# Patient Record
Sex: MALE | Race: BLACK OR AFRICAN AMERICAN | Employment: OTHER | URBAN - METROPOLITAN AREA
[De-identification: names, ages, dates, MRNs, and addresses within clinical notes are randomized per-mention and may not be internally consistent; named-entity substitution may affect disease eponyms.]

---

## 2017-03-31 ENCOUNTER — HOSPITAL ENCOUNTER (INPATIENT)
Age: 67
LOS: 1 days | Discharge: HOME | DRG: 811 | End: 2017-04-01
Attending: EMERGENCY MEDICINE | Admitting: INTERNAL MEDICINE
Payer: MEDICARE

## 2017-03-31 DIAGNOSIS — D64.9 ANEMIA, UNSPECIFIED TYPE: Primary | ICD-10-CM

## 2017-03-31 PROBLEM — D64.89 OTHER SPECIFIED ANEMIAS: Status: ACTIVE | Noted: 2017-03-31

## 2017-03-31 LAB
ANION GAP BLD CALC-SCNC: 4 MMOL/L (ref 5–15)
ATRIAL RATE: 98 BPM
BASOPHILS # BLD AUTO: 0 K/UL (ref 0–0.1)
BASOPHILS # BLD: 0 % (ref 0–1)
BUN SERPL-MCNC: 12 MG/DL (ref 6–20)
BUN/CREAT SERPL: 3 (ref 12–20)
CALCIUM SERPL-MCNC: 8.6 MG/DL (ref 8.5–10.1)
CALCULATED P AXIS, ECG09: 55 DEGREES
CALCULATED R AXIS, ECG10: 8 DEGREES
CALCULATED T AXIS, ECG11: -177 DEGREES
CHLORIDE SERPL-SCNC: 95 MMOL/L (ref 97–108)
CO2 SERPL-SCNC: 38 MMOL/L (ref 21–32)
CREAT SERPL-MCNC: 3.9 MG/DL (ref 0.7–1.3)
DIAGNOSIS, 93000: NORMAL
DIFFERENTIAL METHOD BLD: ABNORMAL
EOSINOPHIL # BLD: 0.8 K/UL (ref 0–0.4)
EOSINOPHIL NFR BLD: 11 % (ref 0–7)
ERYTHROCYTE [DISTWIDTH] IN BLOOD BY AUTOMATED COUNT: 18.9 % (ref 11.5–14.5)
FERRITIN SERPL-MCNC: 581 NG/ML (ref 26–388)
FOLATE SERPL-MCNC: 18.8 NG/ML (ref 5–21)
GLUCOSE BLD STRIP.AUTO-MCNC: 99 MG/DL (ref 65–100)
GLUCOSE SERPL-MCNC: 97 MG/DL (ref 65–100)
HCT VFR BLD AUTO: 18.4 % (ref 36.6–50.3)
HEMOCCULT STL QL: POSITIVE
HGB BLD-MCNC: 6 G/DL (ref 12.1–17)
IRON SATN MFR SERPL: 25 % (ref 20–50)
IRON SERPL-MCNC: 79 UG/DL (ref 35–150)
LYMPHOCYTES # BLD AUTO: 19 % (ref 12–49)
LYMPHOCYTES # BLD: 1.4 K/UL (ref 0.8–3.5)
MCH RBC QN AUTO: 31.9 PG (ref 26–34)
MCHC RBC AUTO-ENTMCNC: 32.6 G/DL (ref 30–36.5)
MCV RBC AUTO: 97.9 FL (ref 80–99)
MONOCYTES # BLD: 0.5 K/UL (ref 0–1)
MONOCYTES NFR BLD AUTO: 6 % (ref 5–13)
NEUTS SEG # BLD: 4.9 K/UL (ref 1.8–8)
NEUTS SEG NFR BLD AUTO: 64 % (ref 32–75)
P-R INTERVAL, ECG05: 308 MS
PLATELET # BLD AUTO: 320 K/UL (ref 150–400)
POTASSIUM SERPL-SCNC: 4 MMOL/L (ref 3.5–5.1)
Q-T INTERVAL, ECG07: 340 MS
QRS DURATION, ECG06: 92 MS
QTC CALCULATION (BEZET), ECG08: 434 MS
RBC # BLD AUTO: 1.88 M/UL (ref 4.1–5.7)
RBC MORPH BLD: ABNORMAL
RBC MORPH BLD: ABNORMAL
RETICS/RBC NFR AUTO: 11.5 % (ref 0.7–2.1)
SERVICE CMNT-IMP: NORMAL
SODIUM SERPL-SCNC: 137 MMOL/L (ref 136–145)
TIBC SERPL-MCNC: 315 UG/DL (ref 250–450)
TROPONIN I SERPL-MCNC: <0.04 NG/ML
VENTRICULAR RATE, ECG03: 98 BPM
VIT B12 SERPL-MCNC: 898 PG/ML (ref 211–911)
WBC # BLD AUTO: 7.6 K/UL (ref 4.1–11.1)

## 2017-03-31 PROCEDURE — 84484 ASSAY OF TROPONIN QUANT: CPT | Performed by: EMERGENCY MEDICINE

## 2017-03-31 PROCEDURE — 80048 BASIC METABOLIC PNL TOTAL CA: CPT | Performed by: EMERGENCY MEDICINE

## 2017-03-31 PROCEDURE — 85045 AUTOMATED RETICULOCYTE COUNT: CPT | Performed by: FAMILY MEDICINE

## 2017-03-31 PROCEDURE — 82607 VITAMIN B-12: CPT | Performed by: FAMILY MEDICINE

## 2017-03-31 PROCEDURE — 96360 HYDRATION IV INFUSION INIT: CPT

## 2017-03-31 PROCEDURE — 99285 EMERGENCY DEPT VISIT HI MDM: CPT

## 2017-03-31 PROCEDURE — 74011250636 HC RX REV CODE- 250/636: Performed by: EMERGENCY MEDICINE

## 2017-03-31 PROCEDURE — 86923 COMPATIBILITY TEST ELECTRIC: CPT | Performed by: EMERGENCY MEDICINE

## 2017-03-31 PROCEDURE — 74011000250 HC RX REV CODE- 250: Performed by: INTERNAL MEDICINE

## 2017-03-31 PROCEDURE — 30233N1 TRANSFUSION OF NONAUTOLOGOUS RED BLOOD CELLS INTO PERIPHERAL VEIN, PERCUTANEOUS APPROACH: ICD-10-PCS | Performed by: EMERGENCY MEDICINE

## 2017-03-31 PROCEDURE — 36415 COLL VENOUS BLD VENIPUNCTURE: CPT | Performed by: EMERGENCY MEDICINE

## 2017-03-31 PROCEDURE — 86900 BLOOD TYPING SEROLOGIC ABO: CPT | Performed by: EMERGENCY MEDICINE

## 2017-03-31 PROCEDURE — 82962 GLUCOSE BLOOD TEST: CPT

## 2017-03-31 PROCEDURE — P9016 RBC LEUKOCYTES REDUCED: HCPCS | Performed by: EMERGENCY MEDICINE

## 2017-03-31 PROCEDURE — 82272 OCCULT BLD FECES 1-3 TESTS: CPT | Performed by: EMERGENCY MEDICINE

## 2017-03-31 PROCEDURE — 74011250636 HC RX REV CODE- 250/636: Performed by: INTERNAL MEDICINE

## 2017-03-31 PROCEDURE — 85025 COMPLETE CBC W/AUTO DIFF WBC: CPT | Performed by: EMERGENCY MEDICINE

## 2017-03-31 PROCEDURE — 82728 ASSAY OF FERRITIN: CPT | Performed by: FAMILY MEDICINE

## 2017-03-31 PROCEDURE — 74011250637 HC RX REV CODE- 250/637: Performed by: INTERNAL MEDICINE

## 2017-03-31 PROCEDURE — C9113 INJ PANTOPRAZOLE SODIUM, VIA: HCPCS | Performed by: INTERNAL MEDICINE

## 2017-03-31 PROCEDURE — 65270000032 HC RM SEMIPRIVATE

## 2017-03-31 PROCEDURE — 36430 TRANSFUSION BLD/BLD COMPNT: CPT

## 2017-03-31 PROCEDURE — 82746 ASSAY OF FOLIC ACID SERUM: CPT | Performed by: FAMILY MEDICINE

## 2017-03-31 PROCEDURE — 83540 ASSAY OF IRON: CPT | Performed by: FAMILY MEDICINE

## 2017-03-31 PROCEDURE — 93005 ELECTROCARDIOGRAM TRACING: CPT

## 2017-03-31 RX ORDER — CEPHALEXIN 250 MG/1
250 CAPSULE ORAL EVERY 12 HOURS
Status: DISCONTINUED | OUTPATIENT
Start: 2017-03-31 | End: 2017-04-01 | Stop reason: HOSPADM

## 2017-03-31 RX ORDER — HYDRALAZINE HYDROCHLORIDE 25 MG/1
25 TABLET, FILM COATED ORAL 2 TIMES DAILY
Status: DISCONTINUED | OUTPATIENT
Start: 2017-03-31 | End: 2017-04-01 | Stop reason: HOSPADM

## 2017-03-31 RX ORDER — SODIUM CHLORIDE 0.9 % (FLUSH) 0.9 %
5-10 SYRINGE (ML) INJECTION EVERY 8 HOURS
Status: DISCONTINUED | OUTPATIENT
Start: 2017-03-31 | End: 2017-04-01 | Stop reason: HOSPADM

## 2017-03-31 RX ORDER — SODIUM CHLORIDE 0.9 % (FLUSH) 0.9 %
5-10 SYRINGE (ML) INJECTION AS NEEDED
Status: DISCONTINUED | OUTPATIENT
Start: 2017-03-31 | End: 2017-04-01 | Stop reason: HOSPADM

## 2017-03-31 RX ORDER — SODIUM CHLORIDE 9 MG/ML
250 INJECTION, SOLUTION INTRAVENOUS AS NEEDED
Status: DISCONTINUED | OUTPATIENT
Start: 2017-03-31 | End: 2017-04-01 | Stop reason: HOSPADM

## 2017-03-31 RX ORDER — ALBUTEROL SULFATE 90 UG/1
2 AEROSOL, METERED RESPIRATORY (INHALATION)
Status: DISCONTINUED | OUTPATIENT
Start: 2017-03-31 | End: 2017-03-31 | Stop reason: CLARIF

## 2017-03-31 RX ORDER — INSULIN LISPRO 100 [IU]/ML
INJECTION, SOLUTION INTRAVENOUS; SUBCUTANEOUS
Status: DISCONTINUED | OUTPATIENT
Start: 2017-04-01 | End: 2017-04-01 | Stop reason: HOSPADM

## 2017-03-31 RX ORDER — ACETAMINOPHEN 325 MG/1
650 TABLET ORAL
Status: DISCONTINUED | OUTPATIENT
Start: 2017-03-31 | End: 2017-04-01 | Stop reason: HOSPADM

## 2017-03-31 RX ORDER — ALBUTEROL SULFATE 0.83 MG/ML
2.5 SOLUTION RESPIRATORY (INHALATION)
Status: DISCONTINUED | OUTPATIENT
Start: 2017-03-31 | End: 2017-04-01 | Stop reason: HOSPADM

## 2017-03-31 RX ORDER — SEVELAMER CARBONATE 800 MG/1
800 TABLET, FILM COATED ORAL
Status: DISCONTINUED | OUTPATIENT
Start: 2017-03-31 | End: 2017-04-01 | Stop reason: HOSPADM

## 2017-03-31 RX ORDER — AMLODIPINE BESYLATE 5 MG/1
10 TABLET ORAL DAILY
Status: DISCONTINUED | OUTPATIENT
Start: 2017-04-01 | End: 2017-04-01 | Stop reason: HOSPADM

## 2017-03-31 RX ORDER — CEPHALEXIN 500 MG/1
500 CAPSULE ORAL 2 TIMES DAILY
Status: DISCONTINUED | OUTPATIENT
Start: 2017-03-31 | End: 2017-03-31 | Stop reason: DRUGHIGH

## 2017-03-31 RX ADMIN — CEPHALEXIN 250 MG: 250 CAPSULE ORAL at 21:29

## 2017-03-31 RX ADMIN — HYDRALAZINE HYDROCHLORIDE 25 MG: 25 TABLET, FILM COATED ORAL at 17:07

## 2017-03-31 RX ADMIN — SEVELAMER CARBONATE 800 MG: 800 TABLET, FILM COATED ORAL at 21:29

## 2017-03-31 RX ADMIN — Medication 10 ML: at 21:29

## 2017-03-31 RX ADMIN — SEVELAMER CARBONATE 800 MG: 800 TABLET, FILM COATED ORAL at 17:07

## 2017-03-31 RX ADMIN — SODIUM CHLORIDE 40 MG: 9 INJECTION INTRAMUSCULAR; INTRAVENOUS; SUBCUTANEOUS at 21:29

## 2017-03-31 RX ADMIN — Medication 10 ML: at 17:08

## 2017-03-31 NOTE — PROGRESS NOTES
Problem: Anemia Care Plan (Adult and Pediatric)  Goal: *Labs within defined limits  Outcome: Progressing Towards Goal  Patient receiving one unit of PRBC

## 2017-03-31 NOTE — CONSULTS
Lennox Ishihara, M.D.  [phone]          GASTROENTEROLOGY CONSULTATION NOTE      NAME:  Karrie Ambriz   :   1950   MRN:   [id number]       Referring Physician: Dr. Kassandra Miller Date: 3/31/2017     Chief Complaint: anemia, heme positive stool    History of Present Illness:  Patient is a 78 y.o. with a history of ESRD on HD, HTN. DM admitted with severe anemia with hemoglobin of 6. He reports on recent change in stool color (melena), blood in stool, hematochezia, or hematemesis. No abdominal pain, N/V, diarrhea, constipation. Review of record shows that the patient was going to get transfused and then be discharged, but the plan subsequently changed. He is supposed to get transfused 1 unit of PRBCs. He is eating a plate of solid food when I entered into the room. [facility] records indicate that he has been previously worked up by Dr. Hemalatha Poe and has hx of PUD and AVMs. Stool testing during admission notable for brown heme pos stools. PMH:  Past Medical History:   Diagnosis Date    Arthritis     GOUT    Cancer ([facility]     PROSTATE    Chronic kidney disease     KIDNEY FAILURE    Dialysis patient ([facility] [address] [facility] 10/11/2016    ESRD on dialysis ([facility] [facility] 2016    HTN (hypertension) 2011    Kidney disease 2011    Toe amputation status ([facility] 2016    Type II or unspecified type diabetes mellitus without mention of complication, not stated as uncontrolled     PATIENT DENIES       PSH:  Past Surgical History:   Procedure Laterality Date    HX PROSTATECTOMY  OCT 2015    BIOPSY ONLY    VASCULAR SURGERY PROCEDURE UNLIST  11    UPPER R CHEST DIALYSIS ACCESS    VASCULAR SURGERY PROCEDURE UNLIST      SHUNT RIGHT FOREARM FOR DIALYSIS CHEST ACCESS REMOVED       Allergies:  No Known Allergies    Home Medications:  Prior to Admission Medications   Prescriptions Last Dose Informant Patient Reported? Taking?    PROAIR HFA 90 mcg/actuation inhaler No Yes   Sig: INHALE TWO PUFFS BY MOUTH EVERY 4 HOURS AS NEEDED FOR SHORTNESS OF BREATH   RENVELA 800 mg Tab tab   No Yes   Sig: TAKE 1 TABLET BY MOUTH FIVE TIMES DAILY WITH MEALS AND SNACKS   amLODIPine (NORVASC) 10 mg tablet   No Yes   Sig: Take 1 Tab by mouth daily. cephALEXin (KEFLEX) 500 mg capsule   No Yes   Sig: Take 1 Cap by mouth three (3) times daily. Patient taking differently: Take 500 mg by mouth three (3) times daily. Filled on 3/30 x 5 days   clopidogrel (PLAVIX) 75 mg tablet   Yes Yes   Sig: Take 75 mg by mouth daily. hydrALAZINE (APRESOLINE) 25 mg tablet   No Yes   Sig: Take 1 Tab by mouth two (2) times a day.       Facility-Administered Medications: None       Hospital Medications:  Current Facility-Administered Medications   Medication Dose Route Frequency    0.9% sodium chloride infusion 250 mL  250 mL IntraVENous PRN    [START ON 4/1/2017] amLODIPine (NORVASC) tablet 10 mg  10 mg Oral DAILY    hydrALAZINE (APRESOLINE) tablet 25 mg  25 mg Oral BID    sevelamer carbonate (RENVELA) tab 800 mg  800 mg Oral 5XD    sodium chloride (NS) flush 5-10 mL  5-10 mL IntraVENous Q8H    sodium chloride (NS) flush 5-10 mL  5-10 mL IntraVENous PRN    acetaminophen (TYLENOL) tablet 650 mg  650 mg Oral Q4H PRN    cephALEXin (KEFLEX) capsule 250 mg  250 mg Oral Q12H    albuterol (PROVENTIL VENTOLIN) nebulizer solution 2.5 mg  2.5 mg Nebulization Q4H PRN       Family History:  Family History   Problem Relation Age of Onset    Cancer Mother      LUNG AND COLON    Kidney Disease Mother     Hypertension Father     Hypertension Sister     Diabetes Brother     Kidney Disease Brother     Diabetes Sister     Diabetes Sister     Anesth Problems Neg Hx        Social History:  Social History   Substance Use Topics    Smoking status: Current Every Day Smoker     Packs/day: 2.00     Years: 55.00    Smokeless tobacco: Never Used    Alcohol use No      Comment: STOPPED DRINKING IN 2007-RECOVERING ALCOHOLIC-QUIT ON HIS OWN       Review of Systems: The following are negative  Fever  Chills  HA  Dizziness  Good mood  Rash  Jaundice  Joint pain  Weight loss  Odynophagia  Dysphagia  Heartburn  Chest pain  Dyspnea  N/V  Hematemesis  Abd pain  Diarrhea  Constipation  Dysuria  Melena  Hematochezia  The rest of the review of systems is negative except per HPI      Objective:   Patient Vitals for the past 8 hrs:   BP Temp Pulse Resp SpO2 Height Weight   03/31/17 1627 165/50 98.7 °F (37.1 °C) 89 17 96 % - -   03/31/17 1612 108/88 98.1 °F (36.7 °C) 93 17 100 % - -   03/31/17 1458 144/43 98.8 °F (37.1 °C) 97 18 95 % - -   03/31/17 1415 121/76 - - - 95 % - -   03/31/17 1400 122/72 - - - 96 % - -   03/31/17 1332 - 98.2 °F (36.8 °C) 98 18 100 % - -   03/31/17 1315 151/62 - - - 93 % - -   03/31/17 1300 150/57 - - - 97 % - -   03/31/17 1245 149/51 - - - 99 % - -   03/31/17 1215 129/61 - - - 96 % - -   03/31/17 1200 132/65 - - - 97 % - -   03/31/17 1145 124/61 - - - 99 % - -   03/31/17 1130 132/59 - - - 97 % - -   03/31/17 1115 123/62 - - - 98 % - -   03/31/17 1100 120/61 - - - 100 % - -   03/31/17 1000 122/61 - - - 98 % - -   03/31/17 0948 129/57 98 °F (36.7 °C) (!) 102 17 96 % 5' 8\" (1.727 m) 77.4 kg (170 lb 10.2 oz)               PHYSICAL EXAM:  General: Alert, in no acute distress    HEENT: Anicteric conjunctiva. Lungs:            CTA Bilaterally  Heart:  Normal S1, S2    Abdomen: Soft, Non distended, Non tender. Normoactive bowel sounds, no HSM,   no rebound/guarding  MSK:   Normal muscle tone  Skin:   Warm to touch  Extremities: Negative bilateral pedal edema   Psych:   Good insight. Not anxious nor agitated.     Lab Data Reviewed:   Recent Labs      03/31/17   1022   WBC  7.6   HGB  6.0*   HCT  18.4*   PLT  320     Recent Labs      03/31/17   1022   NA  137   K  4.0   CL  95*   CO2  38*   BUN  12   CREA  3.90*   GLU  97   CA  8.6     No results for input(s): SGOT, GPT, AP, TBIL, TP, ALB, GLOB, GGT, AML, LPSE in the last 72 hours. No lab exists for component: AMYP, HLPSE  No results for input(s): INR, PTP, APTT in the last 72 hours. No lab exists for component: INREXT   No results for input(s): FE, TIBC, PSAT, FERR in the last 72 hours. No results for input(s): CPK, CKMB in the last 72 hours. No lab exists for component: HARPAL    See Electronic Medical Record for all procedure/radiology reports and details which were not copied into this note but were reviewed prior to the creation of the Plan. Assessment:   · Severe anemia with hemoglobin of 6  · Normal BUN  · No signs of acute bleeding  · Rectal examination with brown heme pos stool  · Hemodynamics stable  · He is eating solid foods  · He is not sure if he will undergo endoscopic testing - \"I'll think about it\"     Patient Active Problem List   Diagnosis Code    Kidney disease N28.9    ESRD (end stage renal disease) ([facility] [facility] N18.6    Toe amputation status ([facility] [address] [facility] Z89.429    Type 2 diabetes, diet controlled ([facility] [address] E11.9    ESRD on dialysis ([facility] [address] N18.6, Z99.2    Dialysis patient ([address] Z99.2    Other specified anemias D64.89    Severe anemia D64.9       Plan:   · Monitor H/H and transfuse as needed  · Protonix 40 mg IV BID while in the hospital for empiric rx PUD  · No plans for EGD today due to the fact that he is eating solid food  · Will review old endoscopy reports  · Consider EGD on Monday if he is still in house and willing to proceed  · Thank you for the consultation. Please call with any questions.      Signed by: Hernando Diaz MD         3/31/2017  4:47 PM

## 2017-03-31 NOTE — IP AVS SNAPSHOT
[address] [facility] [address] [place] [address] 
[phone] Patient: Chung Abraham MRN: [id number] :1950 You are allergic to the following No active allergies Recent Documentation Height Weight BMI Smoking Status 1.727 m 79.3 kg 26.58 kg/m2 Current Every Day Smoker Unresulted Labs Order Current Status PROTEIN ELECTROPHORESIS In process SAMPLE TO BLOOD BANK In process Emergency Contacts Name Discharge Info Relation Home Work Mobile Espiridion Seller DISCHARGE CAREGIVER [3] Other Relative [6] [phone] About your hospitalization You were admitted on:  2017 You last received care in the:  [address] [place] [address] MED SURG You were discharged on:  2017 Unit phone number:  [phone] Why you were hospitalized Your primary diagnosis was:  Not on File Your diagnoses also included:  Dialysis Patient (Hcc), Esrd (End Stage Renal Disease) (Hcc), Type 2 Diabetes, Diet Controlled (Hcc), Other Specified Anemias, Severe Anemia, Heme Positive Stool, Toe Amputation Status ([facility]) Providers Seen During Your Hospitalizations Provider Role Specialty Primary office phone Rosalie Nieto DO Attending Provider Emergency Medicine [phone] Gabriel Eduardo MD Attending Provider Family Practice [phone] Your Primary Care Physician (PCP) Primary Care Physician Office Phone Office Fax Skylar Strange [phone] Follow-up Information Follow up With Details Comments Contact Info Gabriel Eduardo MD   [address] [address] 
[phone] Val Marshall MD   [address] [facility] [address] [place] [address] 
[phone] Current Discharge Medication List  
  
START taking these medications Dose & Instructions Dispensing Information Comments Morning Noon Evening Bedtime acetaminophen 325 mg tablet Commonly known as:  TYLENOL Your last dose was: Your next dose is:    
   
   
 Dose:  650 mg Take 2 Tabs by mouth every four (4) hours as needed. Quantity:  30 Tab Refills:  1  
     
   
   
   
  
 omeprazole 20 mg capsule Commonly known as:  PRILOSEC Your last dose was: Your next dose is:    
   
   
 Dose:  20 mg Take 1 Cap by mouth daily. Quantity:  30 Cap Refills:  0 CONTINUE these medications which have NOT CHANGED Dose & Instructions Dispensing Information Comments Morning Noon Evening Bedtime  
 amLODIPine 10 mg tablet Commonly known as:  Sherald Burkitt Your last dose was: Your next dose is:    
   
   
 Dose:  10 mg Take 1 Tab by mouth daily. Quantity:  30 Tab Refills:  11  
     
   
   
   
  
 clopidogrel 75 mg Tab Commonly known as:  PLAVIX Your last dose was: Your next dose is:    
   
   
 Dose:  75 mg Take 75 mg by mouth daily. Refills:  0  
     
   
   
   
  
 hydrALAZINE 25 mg tablet Commonly known as:  APRESOLINE Your last dose was: Your next dose is:    
   
   
 Dose:  25 mg Take 1 Tab by mouth two (2) times a day. Quantity:  60 Tab Refills:  11 PROAIR HFA 90 mcg/actuation inhaler Generic drug:  albuterol Your last dose was: Your next dose is:    
   
   
 INHALE TWO PUFFS BY MOUTH EVERY 4 HOURS AS NEEDED FOR SHORTNESS OF BREATH Quantity:  1 Inhaler Refills:  2 RENVELA 800 mg Tab tab Generic drug:  sevelamer carbonate Your last dose was: Your next dose is: TAKE 1 TABLET BY MOUTH FIVE TIMES DAILY WITH MEALS AND SNACKS Quantity:  450 Tab Refills:  0 STOP taking these medications   
 cephALEXin 500 mg capsule Commonly known as:  [place] Where to Get Your Medications These medications were sent to [facility] [address] [facility].  [address] [facility] [address] [address] Phone:  [phone]  
  omeprazole 20 mg capsule Information on where to get these meds will be given to you by the nurse or doctor. ! Ask your nurse or doctor about these medications  
  acetaminophen 325 mg tablet Discharge Instructions Patient Discharge Instructions Juan Carlos Lockwood / [id number] : 1950 Admitted 3/31/2017 Discharged: 2017 Take Home Medications · It is important that you take the medication exactly as they are prescribed. · Keep your medication in the bottles provided by the pharmacist and keep a list of the medication names, dosages, and times to be taken in your wallet. · Do not take other medications without consulting your doctor. What to do at [facility] Recommended diet: no alcohol. Do not take Ibuprofen, Aleve, or Aspirin. avoid caffeine Recommended activity : continue Dialysis . Stop smoking If you experience any of the following symptoms : blood from your rectum, abdominal pain , nausea,  please follow up with Dr. Jacob Tejeda at [phone] Follow-up with Dr. Cristopher Edwards at Dialysis on 4/3/17 and have your Blood Counts drawn. Call Dr. Jacob Tejeda for an appointment in 4 to 5 days. Information obtained by : 
I understand that if any problems occur once I am at home I am to contact my physician. I understand and acknowledge receipt of the instructions indicated above. Physician's or R.N.'s Signature                                                                  Date/Time Patient or Representative Signature                                                          Date/Time Discharge Orders None Introducing [facility] SERVICES! Tanis Epley introduces [employer] patient portal. Now you can access parts of your medical record, email your doctor's office, and request medication refills online. 1. In your internet browser, go to [email or web address] 2. Click on the First Time User? Click Here link in the Sign In box. You will see the New Member Sign Up page. 3. Enter your [employer] Access Code exactly as it appears below. You will not need to use this code after youve completed the sign-up process. If you do not sign up before the expiration date, you must request a new code. · [employer] Access Code: [email or web address] Expires: 6/29/2017 10:37 AM 
 
4. Enter the last four digits of your Social Security Number (xxxx) and Date of Birth (mm/dd/yyyy) as indicated and click Submit. You will be taken to the next sign-up page. 5. Create a [employer] ID. This will be your [employer] login ID and cannot be changed, so think of one that is secure and easy to remember. 6. Create a [employer] password. You can change your password at any time. 7. Enter your Password Reset Question and Answer. This can be used at a later time if you forget your password. 8. Enter your e-mail address. You will receive e-mail notification when new information is available in [address] 9. Click Sign Up. You can now view and download portions of your medical record. 10. Click the Download Summary menu link to download a portable copy of your medical information. If you have questions, please visit the Frequently Asked Questions section of the [employer] website. Remember, [employer] is NOT to be used for urgent needs. For medical emergencies, dial 911. Now available from your iPhone and Android! General Information Please provide this summary of care documentation to your next provider. Patient Signature:  ____________________________________________________________ Date:  ____________________________________________________________  
  
Inga Osmani Provider Signature:  ____________________________________________________________ Date:  ____________________________________________________________

## 2017-03-31 NOTE — PROGRESS NOTES
TRANSFER - IN REPORT:    Verbal report received from [facility]) on Abigail Campuzano  being received from ED(unit) for routine progression of care      Report consisted of patients Situation, Background, Assessment and   Recommendations(SBAR). Information from the following report(s) SBAR, Kardex, [facility] and Recent Results was reviewed with the receiving nurse. Opportunity for questions and clarification was provided. Assessment completed upon patients arrival to unit and care assumed.

## 2017-03-31 NOTE — ED NOTES
Attempted to call report. Kayode Arellano, on [address] [facility], said the room is blocked and waiting for bed placement to change assignment.

## 2017-03-31 NOTE — PROGRESS NOTES
Kenya Willson, called to give report.   The assigned bed is blocked r/t wound culture sent on 201B to rule out MRSA

## 2017-03-31 NOTE — CONSULTS
Patient with multiple GI bleeds in the past. He is being followed by Dr. Oneal Taylor. Patient missed several of his appointments. Mr. Donzella Gitelman had a severe drop in his Hb over few days. Next HD in Monday. GI consult will be helpful.

## 2017-03-31 NOTE — ED PROVIDER NOTES
HPI Comments: 78 y.o. male with past medical history significant for HTN, arthritis, DM type 2, CKD, prostate cancer, toe amputation, ESRD on dialysis, dialysis, prostatectomy, and vascular surgery who presents via EMS for evaluation of abnormal lab results. Pt arrives via EMS from [facility] dialysis after receiving a full treatment because his results from 2 days ago showed a hgb of 5.4. Pt states that he feels okay currently, but has had mild sx of a head cold for several weeks for which he saw his PCP yesterday. Pt hasn't picked up his abx yet; normally takes plavix but no AC. Pt reports some dark stool, but it's still brown in color. Pt denies any hx of ulcer or bleeding bowels. Pt denies any fatigue or feeling tired, bloody stool, abd pain, CP, SOB, tarry stool, or sleeping extra. There are no other acute medical concerns at this time. PCP: Vishal Bobo MD  Nephrology: Chetan Cabral MD    Note written by Luis Armando Thomas, as dictated by Claus Bah DO 10:57 AM    Chart review: Saw Dr. Saray Rocha yesterday was dx with maxillary sinusitis and put on kefflex. Last hgb was 5/12/2016 was 9.0. The history is provided by the patient. No  was used.         Past Medical History:   Diagnosis Date    Arthritis     GOUT    Cancer ([facility] [address] 2015    PROSTATE    Chronic kidney disease     KIDNEY FAILURE    Dialysis patient ([facility] 10/11/2016    ESRD on dialysis ([facility] 8/23/2016    HTN (hypertension) 7/21/2011    Kidney disease 7/21/2011    Toe amputation status ([facility] 8/23/2016    Type II or unspecified type diabetes mellitus without mention of complication, not stated as uncontrolled     PATIENT DENIES       Past Surgical History:   Procedure Laterality Date    HX PROSTATECTOMY  OCT 2015    BIOPSY ONLY    VASCULAR SURGERY PROCEDURE UNLIST  6/21/11    UPPER R CHEST DIALYSIS ACCESS    VASCULAR SURGERY PROCEDURE UNLIST  2011    SHUNT RIGHT FOREARM FOR DIALYSIS CHEST ACCESS REMOVED         Family History:   Problem Relation Age of Onset    Cancer Mother      LUNG AND COLON    Kidney Disease Mother     Hypertension Father     Hypertension Sister     Diabetes Brother     Kidney Disease Brother     Diabetes Sister     Diabetes Sister     Anesth Problems Neg Hx        Social History     Social History    Marital status: SINGLE     Spouse name: N/A    Number of children: N/A    Years of education: N/A     Occupational History    Not on file. Social History Main Topics    Smoking status: Current Every Day Smoker     Packs/day: 2.00     Years: 55.00    Smokeless tobacco: Never Used    Alcohol use No      Comment: STOPPED DRINKING IN [facility] ALCOHOLIC-QUIT ON HIS OWN    Drug use: No    Sexual activity: Not on file     Other Topics Concern    Not on file     Social History Narrative         ALLERGIES: Review of patient's allergies indicates no known allergies. Review of Systems   All other systems reviewed and are negative. Vitals:    03/31/17 0948   BP: 129/57   Pulse: (!) 102   Resp: 17   Temp: 98 °F (36.7 °C)   SpO2: 96%   Weight: 77.4 kg (170 lb 10.2 oz)   Height: 5' 8\" (1.727 m)            Physical Exam   Nursing note and vitals reviewed. Constitutional: Pt is awake and alert. Pt appears well-developed and well-nourished. NAD. HENT:   Head: Normocephalic and atraumatic. Nose: Nose normal.   Mouth/Throat: Oropharynx is clear and moist. No oropharyngeal exudate. Eyes: Conjunctivae and extraocular motions are normal. Pupils are equal, round, and reactive to light. Right eye exhibits no discharge. Left eye exhibits no discharge. No scleral icterus. Neck: No tracheal deviation present. Supple neck. Cardiovascular: Normal rate, regular rhythm, normal heart sounds and intact distal pulses. Exam reveals no gallop and no friction rub. No murmur heard. Pulmonary/Chest: Effort normal and breath sounds normal.  Pt  has no wheezes.   Pt  has no rales.   Abdominal: Soft. Pt  exhibits no distension and no mass. No tenderness. Pt  has no rebound and no guarding. : Rectal exam shows brown stool  Musculoskeletal:  Pt  exhibits no edema and no tenderness. Ext: Normal ROM in all four extremities; not tender to palpation; distal pulses are normal, no edema. Neurological:  Pt is alert. nonfocal neuro exam.  Skin: Skin is warm and dry. Pt  is not diaphoretic. Fistula in R arm, no bleeding. Good thrill. Psychiatric:  Pt  has a normal mood and affect. Behavior is normal.   Note written by Luis Armando Hein, as dictated by Asuncion Mullen DO 11:28 AM              [facility]  ED Course       Procedures    ED EKG interpretation:  Rhythm: normal sinus rhythm with 1st degree AV block; and regular . Rate (approx.): 98; When compared to EKG on 05/12/16 Axis: normal Intervals normal; T wave inversion laterally in lead 2 a little worse now than on 05/12/16. Note written by Luis Armando Hein, as dictated by Asuncion Mullen DO 11:30 AM    PROGRESS NOTE:  11:36 AM  The pt needs a unit of blood    CONSULT NOTE:  12:09 PM Asuncion Mullen DO spoke with Dr. Israel Clemens, Consult for [facility] practice. Discussed available diagnostic tests and clinical findings. He is in agreement with care plans as outlined. Dr. Israel Clemens  wants to give the pt a unit of blood and send him home, doesn't want to send any further studies regarding anemia workup.          ,12:25 PM - D/W Annette - has hx of peptic ulcer bleeding and AVMs. Had ultrafiltration and this is why his hgb got better. She says his hgb was 13 last week. She would like him admitted. CONSULT NOTE:  12:43 PM Asuncion Mullen DO spoke with Dr. Dina Cooley, Consult for Hospitalist.  Discussed available diagnostic tests and clinical findings. He is in agreement with care plans as outlined. Dr. Dina Cooley will admit. Occult blood pos - stool was brown though. No melena or hematochezia.

## 2017-03-31 NOTE — ROUTINE PROCESS
TRANSFER - OUT REPORT:    Verbal report given to [facility], RN (name) on Leigh Montana  being transferred to [facility](unit) for routine progression of care       Report consisted of patients Situation, Background, Assessment and   Recommendations(SBAR). Information from the following report(s) SBAR was reviewed with the receiving nurse. Lines:   Peripheral IV 03/31/17 Left Antecubital (Active)        Opportunity for questions and clarification was provided.       Patient transported with:   [employer]

## 2017-03-31 NOTE — H&P
PCP cross cover. 1. Anemia Hgb 6.0  It was 13 last week per Renal . Heme + brown stool on rectal exam by ER staff  2. Recent URI   3.  ESRD on HD    Orders written

## 2017-03-31 NOTE — PROGRESS NOTES
Admission Medication Reconciliation:    Information obtained from: Rx Query, patient    Significant PMH/Disease States:   Past Medical History:   Diagnosis Date    Arthritis     GOUT    Cancer ([facility] 2015    PROSTATE    Chronic kidney disease     KIDNEY FAILURE    Dialysis patient ([facility] 10/11/2016    ESRD on dialysis ([facility] 8/23/2016    HTN (hypertension) 7/21/2011    Kidney disease 7/21/2011    Toe amputation status ([facility] 8/23/2016    Type II or unspecified type diabetes mellitus without mention of complication, not stated as uncontrolled     PATIENT DENIES       Chief Complaint for this Admission:  Low Hgb    Allergies:  Review of patient's allergies indicates no known allergies. Prior to Admission Medications:   Prior to Admission Medications   Prescriptions Last Dose Informant Patient Reported? Taking? PROAIR HFA 90 mcg/actuation inhaler   No Yes   Sig: INHALE TWO PUFFS BY MOUTH EVERY 4 HOURS AS NEEDED FOR SHORTNESS OF BREATH   RENVELA 800 mg Tab tab   No Yes   Sig: TAKE 1 TABLET BY MOUTH FIVE TIMES DAILY WITH MEALS AND SNACKS   amLODIPine (NORVASC) 10 mg tablet   No Yes   Sig: Take 1 Tab by mouth daily. cephALEXin (KEFLEX) 500 mg capsule   No Yes   Sig: Take 1 Cap by mouth three (3) times daily. Patient taking differently: Take 500 mg by mouth three (3) times daily. Filled on 3/30 x 5 days   clopidogrel (PLAVIX) 75 mg tablet   Yes Yes   Sig: Take 75 mg by mouth daily. hydrALAZINE (APRESOLINE) 25 mg tablet   No Yes   Sig: Take 1 Tab by mouth two (2) times a day. Facility-Administered Medications: None         Comments/Recommendations: [facility] med list updated via information obtained from Rx Query and the patient. The patient was not able to provide names, dosages, or frequencies of medications. He states that he has \"about 4 or 5 medication bottles at home\"  Information was provided from Rx Query. Patient had cephalexin filled on 3/30/17 for a 5 day supply.   Unsure if the patient has started the course.

## 2017-03-31 NOTE — ED TRIAGE NOTES
TRIAGE NOTE: Arrives via EMS from [facility] dialysis. Received full treatment today. Wednesday's labs resulted with hemoglobin of 5.4. Patient denies complaints.

## 2017-04-01 VITALS
DIASTOLIC BLOOD PRESSURE: 53 MMHG | WEIGHT: 174.82 LBS | OXYGEN SATURATION: 97 % | HEIGHT: 68 IN | SYSTOLIC BLOOD PRESSURE: 141 MMHG | BODY MASS INDEX: 26.5 KG/M2 | HEART RATE: 64 BPM | RESPIRATION RATE: 16 BRPM | TEMPERATURE: 97.2 F

## 2017-04-01 PROBLEM — R19.5 HEME POSITIVE STOOL: Status: ACTIVE | Noted: 2017-04-01

## 2017-04-01 LAB
ABO + RH BLD: NORMAL
BLD PROD TYP BPU: NORMAL
BLOOD GROUP ANTIBODIES SERPL: NORMAL
BPU ID: NORMAL
CROSSMATCH RESULT,%XM: NORMAL
ERYTHROCYTE [DISTWIDTH] IN BLOOD BY AUTOMATED COUNT: 19.6 % (ref 11.5–14.5)
GLUCOSE BLD STRIP.AUTO-MCNC: 112 MG/DL (ref 65–100)
HCT VFR BLD AUTO: 24.7 % (ref 36.6–50.3)
HGB BLD-MCNC: 7.9 G/DL (ref 12.1–17)
LDH SERPL L TO P-CCNC: 179 U/L (ref 85–241)
MCH RBC QN AUTO: 30.3 PG (ref 26–34)
MCHC RBC AUTO-ENTMCNC: 32 G/DL (ref 30–36.5)
MCV RBC AUTO: 94.6 FL (ref 80–99)
PLATELET # BLD AUTO: 324 K/UL (ref 150–400)
RBC # BLD AUTO: 2.61 M/UL (ref 4.1–5.7)
SERVICE CMNT-IMP: ABNORMAL
SPECIMEN EXP DATE BLD: NORMAL
STATUS OF UNIT,%ST: NORMAL
UNIT DIVISION, %UDIV: 0
WBC # BLD AUTO: 9.1 K/UL (ref 4.1–11.1)

## 2017-04-01 PROCEDURE — C9113 INJ PANTOPRAZOLE SODIUM, VIA: HCPCS | Performed by: INTERNAL MEDICINE

## 2017-04-01 PROCEDURE — 82962 GLUCOSE BLOOD TEST: CPT

## 2017-04-01 PROCEDURE — 36415 COLL VENOUS BLD VENIPUNCTURE: CPT | Performed by: INTERNAL MEDICINE

## 2017-04-01 PROCEDURE — 84155 ASSAY OF PROTEIN SERUM: CPT | Performed by: INTERNAL MEDICINE

## 2017-04-01 PROCEDURE — 85027 COMPLETE CBC AUTOMATED: CPT | Performed by: INTERNAL MEDICINE

## 2017-04-01 PROCEDURE — 74011000250 HC RX REV CODE- 250: Performed by: INTERNAL MEDICINE

## 2017-04-01 PROCEDURE — 74011250636 HC RX REV CODE- 250/636: Performed by: INTERNAL MEDICINE

## 2017-04-01 PROCEDURE — 83615 LACTATE (LD) (LDH) ENZYME: CPT | Performed by: INTERNAL MEDICINE

## 2017-04-01 PROCEDURE — 74011250637 HC RX REV CODE- 250/637: Performed by: INTERNAL MEDICINE

## 2017-04-01 RX ORDER — ACETAMINOPHEN 325 MG/1
650 TABLET ORAL
Qty: 30 TAB | Refills: 1 | Status: SHIPPED
Start: 2017-04-01 | End: 2019-08-22

## 2017-04-01 RX ORDER — OMEPRAZOLE 20 MG/1
20 CAPSULE, DELAYED RELEASE ORAL DAILY
Qty: 30 CAP | Refills: 0 | Status: SHIPPED | OUTPATIENT
Start: 2017-04-01 | End: 2017-11-07 | Stop reason: SDUPTHER

## 2017-04-01 RX ADMIN — Medication 10 ML: at 13:19

## 2017-04-01 RX ADMIN — SODIUM CHLORIDE 40 MG: 9 INJECTION INTRAMUSCULAR; INTRAVENOUS; SUBCUTANEOUS at 09:16

## 2017-04-01 RX ADMIN — SEVELAMER CARBONATE 800 MG: 800 TABLET, FILM COATED ORAL at 06:48

## 2017-04-01 RX ADMIN — HYDRALAZINE HYDROCHLORIDE 25 MG: 25 TABLET, FILM COATED ORAL at 09:15

## 2017-04-01 RX ADMIN — Medication 10 ML: at 06:50

## 2017-04-01 RX ADMIN — SEVELAMER CARBONATE 800 MG: 800 TABLET, FILM COATED ORAL at 11:00

## 2017-04-01 RX ADMIN — AMLODIPINE BESYLATE 10 MG: 5 TABLET ORAL at 09:16

## 2017-04-01 RX ADMIN — CEPHALEXIN 250 MG: 250 CAPSULE ORAL at 09:16

## 2017-04-01 RX ADMIN — SEVELAMER CARBONATE 800 MG: 800 TABLET, FILM COATED ORAL at 13:18

## 2017-04-01 NOTE — DISCHARGE SUMMARY
[address] SUMMARY       Name:  Aleksandar Becker   MR#:  [id number]   :  1950   Account #:  [de-identified]        Date of Adm:  2017       DATE OF ADMISSION: 2017    DATE OF DISCHARGE: 2017    DISCHARGE DIAGNOSES:   1. New onset anemia. 2. Heme-positive stool. 3. End-stage renal disease on hemodialysis. 4. Type 2 diabetes mellitus, diet-controlled. 5. Toe amputation status on left foot    DISCHARGE INSTRUCTIONS:   1. Prilosec 20 mg p.o. daily. 2. Tylenol 650 mg p.o. q.4h. p.r.n. pain or fever. No Aleve, Ibuprofen or   aspirin, resume his Norvasc 10 mg daily Stop Keflex, resume Plavix 75   mg every day   3. Hydralazine 25 mg b.i.d.   4. Proventil inhaler 2 whiffs q.4h. p.r.n.   5. Renvela 800 mg p.o. 5 times daily with meals and snacks. 6. Call should he develop any GI evidence of GI bleeding, dizziness, or   abdominal pain. 7. Stop smoking. DIET: Avoid spicy foods. Avoid caffeine, no alcohol. FOLLOWUP: Followup with Dr. Eliz Gonsalez, dialysis in 2 days. Call Dr. Savanna Kent for appointment in 4-5 days. Have CBC checked with   Dr. Eliz Gonsalez next week in dialysis. CHIEF COMPLAINT: A 25-year-old black male admitted with severe   anemia. HISTORY OF PRESENT ILLNESS: He has end-stage renal disease   on hemodialysis, followed by Dr. Lisa elias. He has a history of   peptic ulcer disease and AVMs evaluated in the past by Dr. Min George. Hemoglobin was 13 last week. Yesterday he had a hemoglobin   of 5.4. He was recently started on Keflex for recent URI, he underwent   dialysis the day of admission, post-dialysis hemoglobin was only 6. In   the ER, he was found to have a heme-positive stool. He denies   abdominal pain, nausea, vomiting, melena, hematochezia,   indigestion.    He was admitted to full inpatient admission for further evaluation and treatment   to include a transfusion of packed red blood cells.      PAST MEDICAL HISTORY: Peptic ulcer disease, AVMs scalp,   prostate carcinoma, end-stage renal disease on hemodialysis. Hypertension, toe amputation status left foot. Type 2 diabetes mellitus,   right eye blindness due to glaucoma. PAST SURGICAL HISTORY: Prostatectomy, upper chest dialysis   access right arm AV fistula. In 07/2016, right foot partial amputation for   gangrene at [address] [place] [address]. He was recently reevaluated again at [address] [place] [address] about 1   month and was released from further followup care by [facility]. MEDICATIONS:   1. Amlodipine 10 mg daily. 2. Cephalexin 500 mg t.i.d.   3. Plavix 75 mg daily. 4. Hydralazine 25 mg b.i.d.   5. ProAir 2 whiffs q.4h. p.r.n.   6. Renvela 800 mg p.o. 5 days times daily with meals and snacks. ALLERGIES: NO KNOWN DRUG ALLERGIES. FAMILY HISTORY: Mother had lung, colon carcinoma, kidney disease   - father and sister, hypertension, two other sisters had diabetes,   brother with diabetes and high blood pressure. SOCIAL HISTORY: Smokes a pack per day, 110-pack-year history. He   stopped ETOH in 2007. REVIEW OF SYSTEMS; He feels well. Denies headache, dizziness, chest pain, palpitations. Denies acute focal neurologic symptoms. He had a recent URI and has   been taking Keflex. His cough is much better. PHYSICAL EXAMINATION:   VITAL SIGNS: 98.8, 102, /57. Followup pulse is 88. Room air   saturation 96%. Weight 170 pounds, 10.2 ounces:   GENERAL: Appears in no acute distress. HEENT: EOMI. Throat clear. NECK: No neck nodes. NO carotid bruits. LUNGS: Clear. HEART: Regular without murmur, gallop, or rub. ABDOMEN: BS positive, soft, no mass felt, no HSM, nontender. EXTREMITIES: Clean appearing left foot partially amputated 1+ DP,   PT pulses bilaterally. Missing toes in the left foot, right fifth toe mildly   dry skin, right arm palpable thrill over the AV fistula.    NEUROLOGIC: Neurologic exam nonfocal.    DATA: WBC 7.6, hemoglobin 6.0, hematocrit 18.4, platelet count 811. White count elevated at 11.5. Iron saturation 25%, ferritin level   elevated at 581. G28 normal 570, Folic acid 35.5, glucose 97. BUN 12,   creatinine 3.90. HOSPITAL COURSE: The patient was admitted to a full inpatient   admission and was transfused with one unit of packed red blood cells. The gastroenterologist, Dr. Marck Rodríguez saw the patient in consultation and   participated in his care and agreed with transfusion and the patient   was placed on Protonix 40 mg IV q.12h. He was seen in consultation   by Dr. Sandy Moss, nephrologist. She  agreed with having a GI consultation   performed. The patient's followup hemoglobin was 7.9 on the day of   discharge. He felt improved. He had a recent URI that had   responded to Keflex and this was discontinued after he was on it in the   hospital. The patient was discharged home in improved condition and   was to have followup as above.         MD ALEJANDRA [facility] / Varsha Goins   D:  04/01/2017   15:16   T:  04/01/2017   17:08   Job #:  [id number]

## 2017-04-01 NOTE — PROGRESS NOTES
Patrice Ibarra [address] Hsieh M.D.  [phone]               GASTROENTEROLOGY PROGRESS NOTE        NAME: Cristine Pierson   :  1950   MRN:  [id number]       Subjective:   No abdominal pain. No bloody BMs. Objective:   VITALS:   Last 24hrs VS reviewed. Most recent are:  Visit Vitals    /83 (BP 1 Location: Left arm)    Pulse 82    Temp 97.7 °F (36.5 °C)    Resp 16    Ht 5' 8\" (1.727 m)    Wt 79.3 kg (174 lb 13.2 oz)    SpO2 96%    BMI 26.58 kg/m2       Intake/Output Summary (Last 24 hours) at 17 1100  Last data filed at 17 0904   Gross per 24 hour   Intake              990 ml   Output                0 ml   Net              990 ml       PHYSICAL EXAM:  General: Alert, in no acute distress    Lungs:            CTA Bilaterally  Heart:  Normal S1, S2    Abdomen: Soft, Non distended, Non tender. Normoactive bowel sounds, no HSM,   no rebound/guarding  Skin:   Warm to touch  Psych:   Good insight. Not anxious nor agitated. Lab Data Reviewed:   Recent Labs      17   1014  17   1022   WBC  9.1  7.6   HGB  7.9*  6.0*   HCT  24.7*  18.4*   PLT  324  320     Recent Labs      17   1022   NA  137   K  4.0   CL  95*   CO2  38*   BUN  12   CREA  3.90*   GLU  97   CA  8.6     No results for input(s): SGOT, GPT, AP, TBIL, TP, ALB, GLOB, GGT, AML, LPSE in the last 72 hours. No lab exists for component: AMYP, HLPSE  No results for input(s): INR, PTP, APTT in the last 72 hours. No lab exists for component: INREXT   Recent Labs      17   1546   FE  79   TIBC  315   PSAT  25   FERR  581*     No results for input(s): CPK, CKMB in the last 72 hours. No lab exists for component: HARPAL    See Electronic Medical Record for all procedure/radiology reports and details which were not copied into this note but were reviewed prior to the creation of the Plan.     Assessment:   · Severe anemia with hemoglobin of 6  · Normal BUN  · No signs of acute bleeding  · Rectal examination with brown heme pos stool  · Hemodynamics stable  · Pt agrees to endoscopic examination.           Patient Active Problem List   Diagnosis Code    Kidney disease N28.9    ESRD (end stage renal disease) ([facility] N18.6    Toe amputation status ([facility] Z89.429    Type 2 diabetes, diet controlled ([facility] E11.9    ESRD on dialysis ([facility] N18.6, Z99.2    Dialysis patient ([facility] Z99.2    Other specified anemias D64.89    Severe anemia D64. 9         Plan:   · Monitor H/H and transfuse as needed  · Protonix 40 mg IV BID while in the hospital for empiric rx PUD  · EGD on Monday if he is still in house         Signed by:  Henrique Barr MD         4/1/2017  11:00 AM

## 2017-04-01 NOTE — CONSULTS
[address] [facility] [address]   [address] [facility]       Name:  Azra Robins   MR#:  [id number]   :  1950   Account #:  [de-identified]    Date of Consultation:  2017   Date of Adm:  2017       REFERRING PHYSICIAN: Dr. Della Chiu. REASON FOR CONSULTATION: Provision of hemodialysis. HISTORY OF PRESENT ILLNESS: The patient is a hemodialysis   patient of mine. He dialyzes Monday, Wednesday, and Friday at Replaced by [facility] dialysis unit. The patient has a long history of previous   gastrointestinal bleeding from AV malformation and peptic ulcer   disease. He usually sees Dr. Raphael Gomez. The patient missed several   of his appointments. His hemoglobin had been stable for awhile when   yesterday all of a sudden, he had a severe drop in hemoglobin from 13   to 5.9. The patient is a poor historian. He does not recall any active   bleed. He was referred to be evaluated in the emergency room at [facility], workup showed only black stool, but heme positive. PAST MEDICAL HISTORY: Hypertension, end-stage renal disease,   several GI bleeds, diabetes mellitus, end-stage renal disease. PAST SURGICAL HISTORY: Placement of AV fistula, amputation of 2   toes due to peripheral vascular disease. ALLERGIES: NO KNOWN MEDICAL ALLERGIES. MEDICATION LIST: Consists of   1. Amlodipine 10 once a day. 2. Keflex. 3. Hydralazine 25 b.i.d.   4. Insulin lispro on a sliding scale. 5. Protonix 40.   6. Sevelamer 800 three times daily with food. 7. Tylenol when needed. 8. Proventil when needed. SOCIAL HISTORY: The patient is retired from [employer]. He is a current smoker. He is single, lives with his   daughter. FAMILY HISTORY: Negative for renal disease. REVIEW OF SYSTEMS   The patient pretty much is asymptomatic, besides feeling fatigued. PHYSICAL EXAMINATION   GENERAL: Middle-aged black man.  He is awake, alert, oriented, not in   acute distress. VITAL SIGNS: Blood pressure 154/83, heart rate is 82, temperature is   97.7. HEENT: Eyes with anicteric sclerae. Mouth with poor oral dentition. NECK: Supple. LUNGS: Clear. HEART: With S1 and S2 with regular rate and rhythm. ABDOMEN: Soft, positive bowel sounds. EXTREMITIES: With no edema. LABORATORY DATA: Hemoglobin is 6, BUN is 12, creatinine 3.9,   Vitamin B12 of 898. Folate 18, iron saturation 25, ferritin 581. IMPRESSION    1. Critical anemia from acute gastrointestinal bleed. The patient is on   maximum dose of Aranesp at the dialysis unit. 2. End-stage renal disease. The patient was dialyzed with his full   treatment on Friday. RECOMMENDATIONS: Dialysis on Monday. We will increase Epogen   to allow correction of anemia. The patient may benefit from blood   transfusion. Thank you very much for the opportunity to be part of this patient's   care.         MD Patricia Galo / Tate Roberts   D:  04/01/2017   10:09   T:  04/01/2017   10:32   Job #:  [id number]

## 2017-04-01 NOTE — PROGRESS NOTES
Primary Nurse Adrienne Newby RN and Marta Guzman RN performed a dual skin assessment on this patient Impairment noted- see wound doc flow sheet  Ankit score is 16    Patient has scars, graft in right arm with limb alert, left foot toes amputated.

## 2017-04-01 NOTE — DISCHARGE INSTRUCTIONS
Patient Discharge Instructions    [place] / [id number] : 1950    Admitted 3/31/2017 Discharged: 2017     Take Home Medications       · It is important that you take the medication exactly as they are prescribed. · Keep your medication in the bottles provided by the pharmacist and keep a list of the medication names, dosages, and times to be taken in your wallet. · Do not take other medications without consulting your doctor. What to do at Home    Recommended diet: no alcohol. Do not take Ibuprofen, Aleve, or Aspirin. avoid caffeine    Recommended activity : continue Dialysis . Stop smoking     If you experience any of the following symptoms : blood from your rectum, abdominal pain , nausea,  please follow up with Dr. Cristopher Che at [phone]     Follow-up with Dr. Vianca Peña at Dialysis on 4/3/17 and have your Blood Counts drawn. Call Dr. Cristopher Che for an appointment in 4 to 5 days. Information obtained by :  I understand that if any problems occur once I am at home I am to contact my physician. I understand and acknowledge receipt of the instructions indicated above.                                                                                                                                            Physician's or R.N.'s Signature                                                                  Date/Time                                                                                                                                              Patient or Representative Signature                                                          Date/Time

## 2017-04-01 NOTE — PROGRESS NOTES
Name: Varghese Roberto MRN: [id number]   : 1950 Hospital: [address]   Date: 2017        IMPRESSION:   · ESRD on HD on //Fri schedule. · Acute anemia- suspect GI bleed. Patient has been on max dose Aranesp at his unit. PLAN:   · HD on Monday if still hospitalized. · Epogen with HD. Subjective/Interval History:   I have reviewed the flowsheet and previous days notes. ROS:A comprehensive review of systems was negative. Objective:   Vital Signs:    Visit Vitals    /83 (BP 1 Location: Left arm)    Pulse 82    Temp 97.7 °F (36.5 °C)    Resp 16    Ht 5' 8\" (1.727 m)    Wt 79.3 kg (174 lb 13.2 oz)    SpO2 96%    BMI 26.58 kg/m2       O2 Device: Room air       Temp (24hrs), Av.3 °F (36.8 °C), Min:97.6 °F (36.4 °C), Max:98.8 °F (37.1 °C)       Intake/Output:   Last shift:      701 -  190  In: 200 [P.O.:200]  Out: -   Last 3 shifts: 1901 -  0700  In: 790 [P.O.:480]  Out: -     Intake/Output Summary (Last 24 hours) at 17 1058  Last data filed at 17 0904   Gross per 24 hour   Intake              990 ml   Output                0 ml   Net              990 ml        Physical Exam:  General:    Alert, cooperative, no distress, appears stated age. Head:   Normocephalic, without obvious abnormality, atraumatic. Eyes:   Conjunctivae/corneas clear. Lungs:   Clear to auscultation bilaterally. No Wheezing or Rhonchi. No rales. Chest wall:  No tenderness or deformity. No Accessory muscle use. Heart:   Regular rate and rhythm,  no murmur, rub or gallop. Abdomen:   Soft, non-tender. Not distended. Bowel sounds normal.   Extremities: Several amputated toes. , No cyanosis. No edema. No clubbing  Skin:     Texture, turgor normal. No rashes or lesions. Not Jaundiced  Psych:  fair insight. Not depressed. Not anxious or agitated. Neurologic: Normal strength, Alert and oriented X 3.        DATA:  Labs:  Recent Labs      17   1022 NA  137   K  4.0   CL  95*   CO2  38*   BUN  12   CREA  3.90*   CA  8.6     Recent Labs      04/01/17   1014  03/31/17   1022   WBC  9.1  7.6   HGB  7.9*  6.0*   HCT  24.7*  18.4*   PLT  324  320     No results for input(s): SKYLER, KU, CLU, CREAU in the last 72 hours.     No lab exists for component: PROU    Total time spent with patient:  35 minutes    [] Critical Care Provided    Care Plan discussed with:   Staff, Medical Team    Lynn Johnson MD

## 2017-04-01 NOTE — H&P
[address]   [address]   HISTORY AND PHYSICAL       Name:  Eloina Taylor   MR#:  [id number]   :  1950   Account #:  [de-identified]        Date of Adm:  2017       CHIEF COMPLAINT: A 48-year-old black male admitted with severe   anemia. HISTORY OF PRESENT ILLNESS: The patient has a history of end-  stage renal disease on hemodialysis, hypertension and diet-controlled   diabetes mellitus. He has a history of peptic ulcer disease and AVMs   evaluated by Dr. Jacob Pina. His hemoglobin was 13 last week. Yesterday, he had a hemoglobin of 5.4. He has had a recent upper   respiratory infection and he was started on Keflex. He underwent   dialysis today and after dialysis, his hemoglobin was 6. He was found   to have heme-positive stool in the ER. He denies abdominal pain,   nausea, vomiting, melena, hematochezia, indigestion. He was   admitted for transfusion of packed red blood cells and to further   evaluate his heme-positive stool and anemia. PAST MEDICAL HISTORY: Peptic ulcer disease, AVMs, gout, prostate   carcinoma, end-stage renal disease on hemodialysis, hypertension,   toe amputation status, type 2 diabetes mellitus, right eye blindness     glaucoma. PAST SURGICAL HISTORY: Prostatectomy, upper chest dialysis   access, right forearm AV fistula 2016, right foot partial amputation   for gangrene at [facility]. He was recently released at [facility]   for this about a month ago. MEDICATIONS   1. Amlodipine 10 mg daily. 2. Cephalexin 500 mg t.i.d.   3. Plavix 75 mg daily. 4. Hydralazine 25 mg b.i.d.   5. ProAir 2 whiffs q.4h. p.r.n.   6. Renvela 800 mg 5 times daily with meals and snacks. ALLERGIES: NO KNOWN DRUG ALLERGIES. FAMILY HISTORY: Mother had lung and colon carcinoma and kidney   disease. Father and sister had hypertension, 2 other sisters had   diabetes, a brother had diabetes and kidney disease.     SOCIAL HISTORY: Smokes a pack per day. His pack year history is   110 pack years. He stopped drinking ETOH in 2007. REVIEW OF SYSTEMS: He feels well. Denies headache, dizziness,   chest pain, palpitations. He is anuric. He denies acute focal neurologic   symptoms. He has had a recent URI, but his cough is vastly improved. PHYSICAL EXAMINATION   VITAL SIGNS: Temperature 98.8, pulse 102, followup pulse 88, BP   129/57. Room air saturation is 96%. Weight 170 pounds 10.2 ounces. GENERAL: No acute distress. HEENT: Right pupil dilates to light. He sees only dark before his right   eye, but counts well before both eyes and before left eye. EOMI. Throat clear. NECK: No neck nodes, no carotid bruits detected. LUNGS: Clear. HEART: Regular without murmur, gallop or rub. ABDOMEN: BS positive, soft, no mass felt, no HSM, nontender. EXTREMITIES: Clean-appearing left foot, partially amputated foot, 1+   DP pulse bilaterally. He has some dry skin on the right fifth toe. Right   arm: Palpable thrill over AV fistula. NEUROLOGIC: Nonfocal.    LABORATORY EVALUATION: WBC 7.6, hemoglobin 6.0, hematocrit   18.4, platelet count 630. Troponin I less than 0.04. Glucose is 97, BUN   12, creatinine 3.90. Reticulocyte count elevated at 11.5. Percent iron   saturation 25, normal ferritin level 581, elevated. B12 normal 987,   folic acid 19.9, both normal.    IMPRESSION   1. Severe anemia with heme-positive stool. Dr. Claude Daughters, GI, has see   the patient and participated in his care. Protonix IV has been added. The cause of his anemia is not clear. Will check followup hemoglobin   and hematocrit. Additional anemia studies will be ordered. 2. Recent upper respiratory infection, better. Keflex has been adjusted   for his renal failure status on hemodialysis. Hopefully, we will be able   to discontinue soon. 3. Hypertension. 4. Diet-controlled type 2 diabetes mellitus. 5. End-stage renal disease on hemodialysis.  Dr. Emma Benz has kindly   consulted on the patient and the patient had dialysis today. The patient is admitted for Dr. Haylie Rhoades, his primary care   physician, for whom I am on call.         MD ALEJANDRA [facility] / Janett Cabello   D:  03/31/2017   18:59   T:  03/31/2017   19:42   Job #:  [id number]

## 2017-04-01 NOTE — ROUTINE PROCESS
Bedside shift change report given to yaneth crandall rn (oncoming nurse) by Beatrice Palumbo (offgoing nurse). Report included the following information SBAR and Kardex.

## 2017-04-01 NOTE — PROGRESS NOTES
Bedside shift change report given to Júnior Victoria RN (oncoming nurse) by Evangelina Alejo RN (offgoing nurse). Report included the following information SBAR, Kardex, Intake/Output, MAR and Recent Results.

## 2017-04-01 NOTE — PROGRESS NOTES
PCP cross cover. Pt seen and examined. Hgb improved to 7.9   He is anxious for d/c and appears stable for d/c  D/c instructions reviewed with him . D/c summary dictated.

## 2017-04-03 LAB
ALBUMIN SERPL ELPH-MCNC: 3.6 G/DL (ref 2.9–4.4)
ALBUMIN/GLOB SERPL: 1.2 {RATIO} (ref 0.7–1.7)
ALPHA1 GLOB SERPL ELPH-MCNC: 0.2 G/DL (ref 0–0.4)
ALPHA2 GLOB SERPL ELPH-MCNC: 0.6 G/DL (ref 0.4–1)
B-GLOBULIN SERPL ELPH-MCNC: 0.9 G/DL (ref 0.7–1.3)
GAMMA GLOB SERPL ELPH-MCNC: 1.3 G/DL (ref 0.4–1.8)
GLOBULIN SER CALC-MCNC: 3.1 G/DL (ref 2.2–3.9)
M PROTEIN SERPL ELPH-MCNC: NORMAL G/DL
PROT SERPL-MCNC: 6.7 G/DL (ref 6–8.5)

## 2017-05-09 ENCOUNTER — ANESTHESIA (OUTPATIENT)
Dept: ENDOSCOPY | Age: 67
End: 2017-05-09
Payer: MEDICARE

## 2017-05-09 ENCOUNTER — HOSPITAL ENCOUNTER (OUTPATIENT)
Age: 67
Setting detail: OUTPATIENT SURGERY
Discharge: HOME | End: 2017-05-09
Attending: INTERNAL MEDICINE | Admitting: INTERNAL MEDICINE
Payer: MEDICARE

## 2017-05-09 ENCOUNTER — ANESTHESIA EVENT (OUTPATIENT)
Dept: ENDOSCOPY | Age: 67
End: 2017-05-09
Payer: MEDICARE

## 2017-05-09 VITALS
SYSTOLIC BLOOD PRESSURE: 161 MMHG | WEIGHT: 174 LBS | HEIGHT: 68 IN | BODY MASS INDEX: 26.37 KG/M2 | TEMPERATURE: 98.6 F | HEART RATE: 101 BPM | RESPIRATION RATE: 18 BRPM | DIASTOLIC BLOOD PRESSURE: 86 MMHG

## 2017-05-09 PROCEDURE — 74011250636 HC RX REV CODE- 250/636: Performed by: INTERNAL MEDICINE

## 2017-05-09 RX ORDER — MIDAZOLAM HYDROCHLORIDE 1 MG/ML
.25-5 INJECTION, SOLUTION INTRAMUSCULAR; INTRAVENOUS
Status: ACTIVE | OUTPATIENT
Start: 2017-05-09 | End: 2017-05-09

## 2017-05-09 RX ORDER — DEXTROMETHORPHAN/PSEUDOEPHED 2.5-7.5/.8
1.2 DROPS ORAL
Status: DISCONTINUED | OUTPATIENT
Start: 2017-05-09 | End: 2017-05-10 | Stop reason: HOSPADM

## 2017-05-09 RX ORDER — ATROPINE SULFATE 0.1 MG/ML
0.5 INJECTION INTRAVENOUS
Status: ACTIVE | OUTPATIENT
Start: 2017-05-09 | End: 2017-05-09

## 2017-05-09 RX ORDER — SODIUM CHLORIDE 0.9 % (FLUSH) 0.9 %
5-10 SYRINGE (ML) INJECTION AS NEEDED
Status: ACTIVE | OUTPATIENT
Start: 2017-05-09 | End: 2017-05-09

## 2017-05-09 RX ORDER — EPINEPHRINE 0.1 MG/ML
1 INJECTION INTRACARDIAC; INTRAVENOUS
Status: ACTIVE | OUTPATIENT
Start: 2017-05-09 | End: 2017-05-09

## 2017-05-09 RX ORDER — SODIUM CHLORIDE 0.9 % (FLUSH) 0.9 %
5-10 SYRINGE (ML) INJECTION EVERY 8 HOURS
Status: ACTIVE | OUTPATIENT
Start: 2017-05-09 | End: 2017-05-09

## 2017-05-09 RX ORDER — SODIUM CHLORIDE 9 MG/ML
150 INJECTION, SOLUTION INTRAVENOUS CONTINUOUS
Status: DISPENSED | OUTPATIENT
Start: 2017-05-09 | End: 2017-05-09

## 2017-05-09 NOTE — ANESTHESIA PREPROCEDURE EVALUATION
Anesthetic History   No history of anesthetic complications            Review of Systems / Medical History  Patient summary reviewed, nursing notes reviewed and pertinent labs reviewed    Pulmonary  Within defined limits  COPD      Smoker         Neuro/Psych   Within defined limits           Cardiovascular  Within defined limits  Hypertension                   GI/Hepatic/Renal  Within defined limits       Renal disease: ESRD and dialysis       Endo/Other  Within defined limits  Diabetes         Other Findings              Physical Exam    Airway  Mallampati: II  TM Distance: > 6 cm  Neck ROM: normal range of motion   Mouth opening: Normal     Cardiovascular  Regular rate and rhythm,  S1 and S2 normal,  no murmur, click, rub, or gallop             Dental    Dentition: Poor dentition     Pulmonary  Breath sounds clear to auscultation               Abdominal  GI exam deferred       Other Findings            Anesthetic Plan    ASA: 3  Anesthesia type: MAC

## 2017-07-26 PROBLEM — I73.9 PERIPHERAL VASCULAR DISEASE (HCC): Status: ACTIVE | Noted: 2017-07-26

## 2019-08-08 PROCEDURE — 30233N1 TRANSFUSION OF NONAUTOLOGOUS RED BLOOD CELLS INTO PERIPHERAL VEIN, PERCUTANEOUS APPROACH: ICD-10-PCS | Performed by: INTERNAL MEDICINE

## 2019-08-09 ENCOUNTER — APPOINTMENT (OUTPATIENT)
Dept: GENERAL RADIOLOGY | Age: 69
DRG: 811 | End: 2019-08-09
Attending: ANESTHESIOLOGY
Payer: MEDICARE

## 2019-08-09 ENCOUNTER — APPOINTMENT (OUTPATIENT)
Dept: GENERAL RADIOLOGY | Age: 69
DRG: 811 | End: 2019-08-09
Attending: HOSPITALIST
Payer: MEDICARE

## 2019-08-09 ENCOUNTER — HOSPITAL ENCOUNTER (INPATIENT)
Age: 69
LOS: 5 days | Discharge: HOME | DRG: 811 | End: 2019-08-14
Attending: HOSPITALIST | Admitting: HOSPITALIST
Payer: MEDICARE

## 2019-08-09 ENCOUNTER — APPOINTMENT (OUTPATIENT)
Dept: CT IMAGING | Age: 69
DRG: 811 | End: 2019-08-09
Attending: HOSPITALIST
Payer: MEDICARE

## 2019-08-09 LAB
ALBUMIN SERPL-MCNC: 2.5 G/DL (ref 3.5–5)
ANION GAP SERPL CALC-SCNC: 5 MMOL/L (ref 5–15)
ATRIAL RATE: 80 BPM
BASOPHILS # BLD: 0 K/UL (ref 0–0.1)
BASOPHILS NFR BLD: 0 % (ref 0–1)
BUN SERPL-MCNC: 12 MG/DL (ref 6–20)
BUN/CREAT SERPL: 2 (ref 12–20)
CALCIUM SERPL-MCNC: 8.1 MG/DL (ref 8.5–10.1)
CALCULATED P AXIS, ECG09: 20 DEGREES
CALCULATED R AXIS, ECG10: -5 DEGREES
CALCULATED T AXIS, ECG11: 57 DEGREES
CHLORIDE SERPL-SCNC: 100 MMOL/L (ref 97–108)
CO2 SERPL-SCNC: 33 MMOL/L (ref 21–32)
CREAT SERPL-MCNC: 5.59 MG/DL (ref 0.7–1.3)
DIAGNOSIS, 93000: NORMAL
DIFFERENTIAL METHOD BLD: ABNORMAL
EOSINOPHIL # BLD: 0.8 K/UL (ref 0–0.4)
EOSINOPHIL NFR BLD: 9 % (ref 0–7)
ERYTHROCYTE [DISTWIDTH] IN BLOOD BY AUTOMATED COUNT: 17.7 % (ref 11.5–14.5)
FERRITIN SERPL-MCNC: 147 NG/ML (ref 26–388)
GLUCOSE SERPL-MCNC: 77 MG/DL (ref 65–100)
HCT VFR BLD AUTO: 15.4 % (ref 36.6–50.3)
HGB BLD-MCNC: 4.5 G/DL (ref 12.1–17)
IMM GRANULOCYTES # BLD AUTO: 0 K/UL
IMM GRANULOCYTES NFR BLD AUTO: 0 %
IRON SATN MFR SERPL: 66 % (ref 20–50)
IRON SERPL-MCNC: 201 UG/DL (ref 35–150)
LYMPHOCYTES # BLD: 1 K/UL (ref 0.8–3.5)
LYMPHOCYTES NFR BLD: 11 % (ref 12–49)
MCH RBC QN AUTO: 26.2 PG (ref 26–34)
MCHC RBC AUTO-ENTMCNC: 29.2 G/DL (ref 30–36.5)
MCV RBC AUTO: 89.5 FL (ref 80–99)
MONOCYTES # BLD: 0.4 K/UL (ref 0–1)
MONOCYTES NFR BLD: 5 % (ref 5–13)
NEUTS SEG # BLD: 6.6 K/UL (ref 1.8–8)
NEUTS SEG NFR BLD: 75 % (ref 32–75)
NRBC # BLD: 0 K/UL (ref 0–0.01)
NRBC BLD-RTO: 0 PER 100 WBC
P-R INTERVAL, ECG05: 304 MS
PATH REV BLD -IMP: ABNORMAL
PHOSPHATE SERPL-MCNC: 3.1 MG/DL (ref 2.6–4.7)
PLATELET # BLD AUTO: 209 K/UL (ref 150–400)
PLATELET COMMENTS,PCOM: ABNORMAL
PMV BLD AUTO: 10.9 FL (ref 8.9–12.9)
POTASSIUM SERPL-SCNC: 4 MMOL/L (ref 3.5–5.1)
Q-T INTERVAL, ECG07: 412 MS
QRS DURATION, ECG06: 82 MS
QTC CALCULATION (BEZET), ECG08: 475 MS
RBC # BLD AUTO: 1.72 M/UL (ref 4.1–5.7)
RBC MORPH BLD: ABNORMAL
SODIUM SERPL-SCNC: 138 MMOL/L (ref 136–145)
TIBC SERPL-MCNC: 303 UG/DL (ref 250–450)
VENTRICULAR RATE, ECG03: 80 BPM
WBC # BLD AUTO: 8.8 K/UL (ref 4.1–11.1)
WBC MORPH BLD: ABNORMAL

## 2019-08-09 PROCEDURE — 65270000029 HC RM PRIVATE

## 2019-08-09 PROCEDURE — 86900 BLOOD TYPING SEROLOGIC ABO: CPT

## 2019-08-09 PROCEDURE — P9016 RBC LEUKOCYTES REDUCED: HCPCS

## 2019-08-09 PROCEDURE — 71045 X-RAY EXAM CHEST 1 VIEW: CPT

## 2019-08-09 PROCEDURE — C1751 CATH, INF, PER/CENT/MIDLINE: HCPCS

## 2019-08-09 PROCEDURE — 86923 COMPATIBILITY TEST ELECTRIC: CPT

## 2019-08-09 PROCEDURE — 90935 HEMODIALYSIS ONE EVALUATION: CPT

## 2019-08-09 PROCEDURE — 36556 INSERT NON-TUNNEL CV CATH: CPT

## 2019-08-09 PROCEDURE — 85025 COMPLETE CBC W/AUTO DIFF WBC: CPT

## 2019-08-09 PROCEDURE — 77030002996 HC SUT SLK J&J -A

## 2019-08-09 PROCEDURE — 36415 COLL VENOUS BLD VENIPUNCTURE: CPT

## 2019-08-09 PROCEDURE — 83540 ASSAY OF IRON: CPT

## 2019-08-09 PROCEDURE — 74011000250 HC RX REV CODE- 250: Performed by: HOSPITALIST

## 2019-08-09 PROCEDURE — 99218 HC RM OBSERVATION: CPT

## 2019-08-09 PROCEDURE — 5A1D70Z PERFORMANCE OF URINARY FILTRATION, INTERMITTENT, LESS THAN 6 HOURS PER DAY: ICD-10-PCS | Performed by: INTERNAL MEDICINE

## 2019-08-09 PROCEDURE — 80069 RENAL FUNCTION PANEL: CPT

## 2019-08-09 PROCEDURE — 02HV33Z INSERTION OF INFUSION DEVICE INTO SUPERIOR VENA CAVA, PERCUTANEOUS APPROACH: ICD-10-PCS | Performed by: ANESTHESIOLOGY

## 2019-08-09 PROCEDURE — 93005 ELECTROCARDIOGRAM TRACING: CPT

## 2019-08-09 PROCEDURE — 74011250636 HC RX REV CODE- 250/636: Performed by: INTERNAL MEDICINE

## 2019-08-09 PROCEDURE — 82728 ASSAY OF FERRITIN: CPT

## 2019-08-09 PROCEDURE — 94640 AIRWAY INHALATION TREATMENT: CPT

## 2019-08-09 PROCEDURE — 74011250637 HC RX REV CODE- 250/637: Performed by: NURSE PRACTITIONER

## 2019-08-09 RX ORDER — SEVELAMER CARBONATE 800 MG/1
800 TABLET, FILM COATED ORAL
Status: DISCONTINUED | OUTPATIENT
Start: 2019-08-09 | End: 2019-08-14

## 2019-08-09 RX ORDER — HYDRALAZINE HYDROCHLORIDE 25 MG/1
25 TABLET, FILM COATED ORAL 2 TIMES DAILY
Status: DISCONTINUED | OUTPATIENT
Start: 2019-08-09 | End: 2019-08-14 | Stop reason: HOSPADM

## 2019-08-09 RX ORDER — SUCRALFATE 1 G/1
1 TABLET ORAL
Status: DISCONTINUED | OUTPATIENT
Start: 2019-08-09 | End: 2019-08-14 | Stop reason: HOSPADM

## 2019-08-09 RX ORDER — ALBUTEROL SULFATE 90 UG/1
1 AEROSOL, METERED RESPIRATORY (INHALATION)
Status: DISCONTINUED | OUTPATIENT
Start: 2019-08-09 | End: 2019-08-09 | Stop reason: CLARIF

## 2019-08-09 RX ORDER — PANTOPRAZOLE SODIUM 40 MG/1
40 TABLET, DELAYED RELEASE ORAL
Status: DISCONTINUED | OUTPATIENT
Start: 2019-08-10 | End: 2019-08-09

## 2019-08-09 RX ORDER — AMLODIPINE BESYLATE 5 MG/1
10 TABLET ORAL DAILY
Status: DISCONTINUED | OUTPATIENT
Start: 2019-08-10 | End: 2019-08-14 | Stop reason: HOSPADM

## 2019-08-09 RX ORDER — OMEPRAZOLE 20 MG/1
20 CAPSULE, DELAYED RELEASE ORAL DAILY
Status: DISCONTINUED | OUTPATIENT
Start: 2019-08-10 | End: 2019-08-09 | Stop reason: CLARIF

## 2019-08-09 RX ORDER — IPRATROPIUM BROMIDE 0.5 MG/2.5ML
0.5 SOLUTION RESPIRATORY (INHALATION)
Status: DISCONTINUED | OUTPATIENT
Start: 2019-08-09 | End: 2019-08-14 | Stop reason: HOSPADM

## 2019-08-09 RX ORDER — SEVELAMER CARBONATE 800 MG/1
800 TABLET, FILM COATED ORAL
Status: DISCONTINUED | OUTPATIENT
Start: 2019-08-09 | End: 2019-08-14 | Stop reason: HOSPADM

## 2019-08-09 RX ORDER — ALBUTEROL SULFATE 0.83 MG/ML
2.5 SOLUTION RESPIRATORY (INHALATION)
Status: DISCONTINUED | OUTPATIENT
Start: 2019-08-09 | End: 2019-08-14 | Stop reason: HOSPADM

## 2019-08-09 RX ORDER — ONDANSETRON 2 MG/ML
4 INJECTION INTRAMUSCULAR; INTRAVENOUS
Status: DISCONTINUED | OUTPATIENT
Start: 2019-08-09 | End: 2019-08-14 | Stop reason: HOSPADM

## 2019-08-09 RX ORDER — SODIUM CHLORIDE 0.9 % (FLUSH) 0.9 %
5-40 SYRINGE (ML) INJECTION EVERY 8 HOURS
Status: DISCONTINUED | OUTPATIENT
Start: 2019-08-09 | End: 2019-08-14 | Stop reason: HOSPADM

## 2019-08-09 RX ORDER — SODIUM CHLORIDE 9 MG/ML
250 INJECTION, SOLUTION INTRAVENOUS AS NEEDED
Status: DISCONTINUED | OUTPATIENT
Start: 2019-08-09 | End: 2019-08-14 | Stop reason: HOSPADM

## 2019-08-09 RX ORDER — CALCITRIOL 0.25 UG/1
1.75 CAPSULE ORAL 3 TIMES WEEKLY
Status: DISCONTINUED | OUTPATIENT
Start: 2019-08-09 | End: 2019-08-14 | Stop reason: HOSPADM

## 2019-08-09 RX ORDER — ACETAMINOPHEN 325 MG/1
650 TABLET ORAL
Status: DISCONTINUED | OUTPATIENT
Start: 2019-08-09 | End: 2019-08-14 | Stop reason: HOSPADM

## 2019-08-09 RX ORDER — SODIUM CHLORIDE 0.9 % (FLUSH) 0.9 %
5-40 SYRINGE (ML) INJECTION AS NEEDED
Status: DISCONTINUED | OUTPATIENT
Start: 2019-08-09 | End: 2019-08-14 | Stop reason: HOSPADM

## 2019-08-09 RX ORDER — ACETAMINOPHEN 325 MG/1
650 TABLET ORAL
Status: DISCONTINUED | OUTPATIENT
Start: 2019-08-09 | End: 2019-08-09 | Stop reason: ALTCHOICE

## 2019-08-09 RX ORDER — PANTOPRAZOLE SODIUM 40 MG/1
40 TABLET, DELAYED RELEASE ORAL
Status: DISCONTINUED | OUTPATIENT
Start: 2019-08-09 | End: 2019-08-14 | Stop reason: HOSPADM

## 2019-08-09 RX ADMIN — Medication 10 ML: at 22:00

## 2019-08-09 RX ADMIN — SUCRALFATE 1 G: 1 TABLET ORAL at 22:14

## 2019-08-09 RX ADMIN — IPRATROPIUM BROMIDE 0.5 MG: 0.5 SOLUTION RESPIRATORY (INHALATION) at 19:33

## 2019-08-09 RX ADMIN — EPOETIN ALFA-EPBX 8000 UNITS: 4000 INJECTION, SOLUTION INTRAVENOUS; SUBCUTANEOUS at 22:00

## 2019-08-09 NOTE — PERIOP NOTES
To preop for placement of central line. Consent obtained. Dr Ankur Andrews placed REJ Quad lumen without difficulty. PCXR completed at bedside. TRANSFER - OUT REPORT:    Verbal report given to [facility] RN(name) on Radha Rosales  being transferred to [facility](unit) for progression of care. Report consisted of patients Situation, Background, Assessment and   Recommendations(SBAR). Information from the following report(s) SBAR and Procedure Summary was reviewed with the receiving nurse. Lines:   Quad Lumen 08/09/19 Right (Active)   Central Line Being Utilized No 8/9/2019  2:36 PM   Criteria for Appropriate Use Other (comment) 8/9/2019  2:36 PM   Site Assessment Clean, dry, & intact 8/9/2019  2:36 PM   Infiltration Assessment 0 8/9/2019  2:36 PM   Affected Extremity/Extremities Color distal to insertion site pink (or appropriate for race) 8/9/2019  2:36 PM   Date of Last Dressing Change 08/09/19 8/9/2019  2:36 PM   Dressing Status Clean, dry, & intact 8/9/2019  2:36 PM   Dressing Type Disk with Chlorhexadine gluconate (CHG); Transparent 8/9/2019  2:36 PM   Proximal Hub Color/Line Status White 8/9/2019  2:36 PM   Positive Blood Return (Medial Site) Yes 8/9/2019  2:36 PM   Medial 1 Hub Color/Line Status Gray 8/9/2019  2:36 PM   Positive Blood Return (Lateral Site) No 8/9/2019  2:36 PM   Medial 2 Hub Color/Line Status Blue 8/9/2019  2:36 PM   Positive Blood Return (Site #3) Yes 8/9/2019  2:36 PM   Distal Hub Color/Line Status Brown 8/9/2019  2:36 PM   Positive Blood Return (Site #4) Yes 8/9/2019  2:36 PM   Alcohol Cap Used Yes 8/9/2019  2:36 PM        Opportunity for questions and clarification was provided.       Patient transported with:   [facility]

## 2019-08-09 NOTE — H&P
History and Physical                                                    Primary Care Provider: Jacob Stevenson MD        Subjective:       Alan Mckeon is a 70 y.o. male  With H/o ESRD. Dm2 who presents with Abnormal labs as a direct admit from dialysis center. Was notified to take over case from Renal Service. He went for regular HD session at his usual place today and was noted to have a low hemoglobin and thus was sent over here for admission. He denies any weakness, Bleeding, Fevers or chills. Denies chest pains. He reports havig seen Dr Deangelo Bravo and had a workup there so far negative. He reports being on plavix for PVD. He is also reporting compliance with HD. Denies any other complaints. .  No labs, no labs sent from Dialysis center. Ordered labs stat now. Review of Systems:  Review of Systems   Constitutional: Negative. HENT: Negative. Eyes: Negative. Respiratory: Negative. Cardiovascular: Negative. Gastrointestinal: Negative. Genitourinary: Negative. Musculoskeletal: Negative. Skin: Negative. Neurological: Negative. Endo/Heme/Allergies: Negative. Psychiatric/Behavioral: Negative.       Past Medical History:   Diagnosis Date    Arthritis     GOUT    Cancer ([facility] [address] 2015    PROSTATE    Chronic kidney disease     KIDNEY FAILURE    Dialysis patient ([facility] [address] 10/11/2016    ESRD on dialysis ([facility] [address] 8/23/2016    Heme positive stool 4/1/2017    3/31/17    HTN (hypertension) 7/21/2011    Kidney disease 7/21/2011    Peripheral vascular disease ([facility] [address] 7/26/2017    S/P transmetatarsal amputation of foot, right (HCC)     Toe amputation status ([facility] [address] 8/23/2016    Type II or unspecified type diabetes mellitus without mention of complication, not stated as uncontrolled     PATIENT DENIES      Past Surgical History:   Procedure Laterality Date    HX PROSTATECTOMY  OCT 2015    BIOPSY ONLY    VASCULAR SURGERY PROCEDURE UNLIST  6/21/11    UPPER R CHEST DIALYSIS ACCESS  VASCULAR SURGERY PROCEDURE UNLIST  2011    SHUNT RIGHT FOREARM FOR DIALYSIS CHEST ACCESS REMOVED     Prior to Admission medications    Medication Sig Start Date End Date Taking? Authorizing Provider   amLODIPine (NORVASC) 10 mg tablet TAKE ONE TABLET BY MOUTH DAILY 1/21/18   Barb Sanchez MD   PROAIR HFA 90 mcg/actuation inhaler INHALE TWO PUFFS BY MOUTH EVERY 4 HOURS AS NEEDED FOR SHORTNESS OF BREATH 12/4/17   Barb Sanchez MD   omeprazole (PRILOSEC) 20 mg capsule TAKE ONE CAPSULE BY MOUTH DAILY 11/7/17   Barb Sanchez MD   clopidogrel (PLAVIX) 75 mg tab Take 1 Tab by mouth daily. 10/18/17   Barb Sanchez MD   hydrALAZINE (APRESOLINE) 25 mg tablet TAKE ONE TABLET BY MOUTH TWICE A DAY 9/20/17   Barb Sanchez MD   [facility] WITH HANDIHALER 18 mcg inhalation capsule INHALE THE ENTIRE CONTENTS OF 1 CAPSULE ONCE A DAY USING HANDIHALER DEVICE 9/10/17   Barb Sanchez MD   acetaminophen (TYLENOL) 325 mg tablet Take 2 Tabs by mouth every four (4) hours as needed. 4/1/17   Dorothy Young IV, MD   RENVELA 800 mg Tab tab TAKE 1 TABLET BY MOUTH FIVE TIMES DAILY WITH MEALS AND SNACKS 1/3/13   Barb Sanchez MD     No Known Allergies   Family History   Problem Relation Age of Onset    Cancer Mother         LUNG AND COLON    Kidney Disease Mother     Hypertension Father     Hypertension Sister     Diabetes Brother     Kidney Disease Brother     Diabetes Sister     Diabetes Sister     Anesth Problems Neg Hx         SOCIAL HISTORY:  Patient resides at Home. Patient ambulates with Self. Smoking history: Denies  Alcohol history: Denies    Objective:       Physical Exam:   Visit Vitals  /59   Pulse 84   Temp 98.4 °F (36.9 °C)   Resp 20   SpO2 100%     General:  Alert, cooperative, no distress, appears stated age. Head:  Normocephalic, without obvious abnormality, atraumatic. Eyes:  Conjunctivae/corneas clear. PERRL, EOMs intact.  Fundi benign   Ears:  Normal TMs and external ear canals both ears. Nose: Nares normal. Septum midline. Mucosa normal. No drainage or sinus tenderness. Throat: Lips, mucosa, and tongue normal. Teeth and gums normal.   Neck: Supple, symmetrical, trachea midline, no adenopathy, thyroid: no enlargement/tenderness/nodules, no carotid bruit and no JVD. Back:   Symmetric, no curvature. ROM normal. No CVA tenderness. Lungs:   Clear to auscultation bilaterally. Chest wall:  No tenderness or deformity. Heart:  Regular rate and rhythm, S1, S2 normal, no murmur, click, rub or gallop. Abdomen:   Soft, non-tender. Bowel sounds normal. No masses,  No organomegaly. Extremities: Extremities normal, atraumatic, no cyanosis or edema. Pulses: 2+ and symmetric all extremities. Rt Arm Fistula bruit +    Skin: Skin color, texture, turgor normal. No rashes or lesions   Lymph nodes: Cervical, supraclavicular, and axillary nodes normal.   Neurologic: CNII-XII intact. Normal strength, sensation and reflexes throughout. Data Review: All diagnostic labs and studies have been reviewed. Recent Results (from the past 24 hour(s))   CBC WITH AUTOMATED DIFF    Collection Time: 08/09/19 11:46 AM   Result Value Ref Range    WBC 8.8 4.1 - 11.1 K/uL    RBC 1.72 (L) 4.10 - 5.70 M/uL    HGB 4.5 (LL) 12.1 - 17.0 g/dL    HCT 15.4 (LL) 36.6 - 50.3 %    MCV 89.5 80.0 - 99.0 FL    MCH 26.2 26.0 - 34.0 PG    MCHC 29.2 (L) 30.0 - 36.5 g/dL    RDW 17.7 (H) 11.5 - 14.5 %    PLATELET 378 862 - 658 K/uL    MPV 10.9 8.9 - 12.9 FL    NRBC 0.0 0  WBC    ABSOLUTE NRBC 0.00 0.00 - 0.01 K/uL    NEUTROPHILS 75 32 - 75 %    LYMPHOCYTES 11 (L) 12 - 49 %    MONOCYTES 5 5 - 13 %    EOSINOPHILS 9 (H) 0 - 7 %    BASOPHILS 0 0 - 1 %    IMMATURE GRANULOCYTES 0 %    ABS. NEUTROPHILS 6.6 1.8 - 8.0 K/UL    ABS. LYMPHOCYTES 1.0 0.8 - 3.5 K/UL    ABS. MONOCYTES 0.4 0.0 - 1.0 K/UL    ABS. EOSINOPHILS 0.8 (H) 0.0 - 0.4 K/UL    ABS. BASOPHILS 0.0 0.0 - 0.1 K/UL    ABS. IMM.  GRANS. 0.0 K/UL    DF MANUAL      PLATELET COMMENTS Large Platelets      RBC COMMENTS ANISOCYTOSIS  1+       RENAL FUNCTION PANEL    Collection Time: 08/09/19 11:46 AM   Result Value Ref Range    Sodium 138 136 - 145 mmol/L    Potassium 4.0 3.5 - 5.1 mmol/L    Chloride 100 97 - 108 mmol/L    CO2 33 (H) 21 - 32 mmol/L    Anion gap 5 5 - 15 mmol/L    Glucose 77 65 - 100 mg/dL    BUN 12 6 - 20 MG/DL    Creatinine 5.59 (H) 0.70 - 1.30 MG/DL    BUN/Creatinine ratio 2 (L) 12 - 20      GFR est AA 12 (L) >60 ml/min/1.73m2    GFR est non-AA 10 (L) >60 ml/min/1.73m2    Calcium 8.1 (L) 8.5 - 10.1 MG/DL    Phosphorus 3.1 2.6 - 4.7 MG/DL    Albumin 2.5 (L) 3.5 - 5.0 g/dL   IRON PROFILE    Collection Time: 08/09/19 11:46 AM   Result Value Ref Range    Iron 201 (H) 35 - 150 ug/dL    TIBC 303 250 - 450 ug/dL    Iron % saturation 66 (H) 20 - 50 %   FERRITIN    Collection Time: 08/09/19 11:46 AM   Result Value Ref Range    Ferritin 147 26 - 388 NG/ML   EKG, 12 LEAD, INITIAL    Collection Time: 08/09/19 12:03 PM   Result Value Ref Range    Ventricular Rate 80 BPM    Atrial Rate 80 BPM    P-R Interval 304 ms    QRS Duration 82 ms    Q-T Interval 412 ms    QTC Calculation (Bezet) 475 ms    Calculated P Axis 20 degrees    Calculated R Axis -5 degrees    Calculated T Axis 57 degrees    Diagnosis       Sinus rhythm with 1st degree AV block  Nonspecific T wave abnormality  Prolonged QT  Abnormal ECG  When compared with ECG of 31-MAR-2017 09:55,  Nonspecific T wave abnormality, improved in Inferior leads  Nonspecific T wave abnormality has replaced inverted T waves in Lateral leads         Xr Chest Port    Result Date: 8/9/2019  IMPRESSION: 1. Recommend repositioning of the right external jugular catheter. Exact location of the tip is uncertain. Please see above text. 2. No pneumothorax. 3. The left pleural effusion and left lower lobe atelectasis persists    Xr Chest Port    Result Date: 8/9/2019  IMPRESSION: Mild cardiac silhouette enlargement. Clear lungs. Assessment:     Active Problems:    ESRD (end stage renal disease) ([facility] [address] (7/21/2011)                Plan:      1. Anemia - Appears to be chronic issue , no active external bleeding. Pt reports extensive Gi workup as op. Will request eval. Transfuse 2 units of blood Now with HD, recheck hg post transfusion, d/w pt and wife and they are in agreement with transfusion,     2. ESRD- HD today per renal. Potassium ok. 3. Anemia of chronic Disease - Epoiten per renal     4. HTn - stable, c/w home Meds      DVT ppx On SCD  Code : Full       Patient was explained about the risk associated with hospitalization including (and not a complete) risk of falls,fractures,blood clots,Bleeding from medications (including anticoagulants used for DVT ppx), Sepsis,allergic reactions,infections,radiation risk from the imaging studies performed,transfusions of blood products,Renal failure  and also risk of death. Patient also understands and agrees to the treatment plan including medications and also understand the risk with radiation while undergoing imaging studies. The patient  And  family  (after permission given by the patient) understands the need to be hospitalized and follow medical orders, agree with the admission plan. Thank You Dr Marquis William MD for taking care of your patient. Please call if you have any questions.      Signed By: Vy Ferrer MD     August 9, 2019

## 2019-08-09 NOTE — CONSULTS
NEPHROLOGY CONSULT NOTE     Patient: Mike Vaughan MRN: [id number]  PCP: Nino Wallace MD   :     1950  Age:   70 y.o. Sex:  male      Referring physician: Dr. Karly Molina    Reason for consultation:       ESRD (on HD)      Mr. Jordan De Anda was seen and examined in Room 605 today. His Sister Rebecca Garay was in attendance. Admission Date: 2019 10:14 AM  LOS: 0 days     DISCUSSION / PLAN :   Dialysis again today (19). The [facility] Team was notified. Dialysis orders were entered. Further work-up of anemia will be deferred to the Admitting Team.  EPO  Check iron stores, CBC. Active Problems / Assessment AAActive  : Active Problems:    ESRD (end stage renal disease) ([facility] [address] (2011)    HTN  DM  Anemia in CKD - symptomatic     Subjective:       \" I feel alright \"      HPI:      Mr. Jordan De Anda is a 70year old gentleman with a history of ESRD , DM , HTN and Anemia of Chronic Disease. He is followed by my partner Dr. Montana Taylor at the [place] HD unit. He is on a M/W/F schedule. His last HD session occurred on Wed (19). Mr. Jordan De Anda was informed about a hemoglobin of 5.5 this week. He reported \" feeling cold \". However, there was no suggestion of GI bleeding , chest pain or palpitations. He was referred to [facility] for further evaluation and management. He is due for HD today.          Past Medical Hx:   Past Medical History:   Diagnosis Date    Arthritis     GOUT    Cancer ([facility] [address]     PROSTATE    Chronic kidney disease     KIDNEY FAILURE    Dialysis patient ([facility] [address] 10/11/2016    ESRD on dialysis ([facility] [address] 2016    Heme positive stool 2017    3/31/17    HTN (hypertension) 2011    Kidney disease 2011    Peripheral vascular disease ([facility] [address] 2017    S/P transmetatarsal amputation of foot, right (HCC)     Toe amputation status ([facility] [address] 2016    Type II or unspecified type diabetes mellitus without mention of complication, not stated as uncontrolled PATIENT DENIES          History of Peptic Ulcer Disease + AVMs --> Seen by Dr. Nelda Austin (GI) a number of years ago. Past Surgical Hx:     Past Surgical History:   Procedure Laterality Date    HX PROSTATECTOMY  OCT 2015    BIOPSY ONLY    VASCULAR SURGERY PROCEDURE UNLIST  6/21/11    UPPER R CHEST DIALYSIS ACCESS    VASCULAR SURGERY PROCEDURE UNLIST  2011    SHUNT RIGHT FOREARM FOR DIALYSIS CHEST ACCESS REMOVED       Medications:  Prior to Admission medications    Medication Sig Start Date End Date Taking? Authorizing Provider   amLODIPine (NORVASC) 10 mg tablet TAKE ONE TABLET BY MOUTH DAILY 1/21/18   Milla Scruggs MD   PROAIR HFA 90 mcg/actuation inhaler INHALE TWO PUFFS BY MOUTH EVERY 4 HOURS AS NEEDED FOR SHORTNESS OF BREATH 12/4/17   Milla Scruggs MD   omeprazole (PRILOSEC) 20 mg capsule TAKE ONE CAPSULE BY MOUTH DAILY 11/7/17   Milla Scruggs MD   clopidogrel (PLAVIX) 75 mg tab Take 1 Tab by mouth daily. 10/18/17   Milla Scruggs MD   hydrALAZINE (APRESOLINE) 25 mg tablet TAKE ONE TABLET BY MOUTH TWICE A DAY 9/20/17   Milla Scruggs MD   [facility] WITH HANDIHALER 18 mcg inhalation capsule INHALE THE ENTIRE CONTENTS OF 1 CAPSULE ONCE A DAY USING HANDIHALER DEVICE 9/10/17   Milla Scruggs MD   acetaminophen (TYLENOL) 325 mg tablet Take 2 Tabs by mouth every four (4) hours as needed. 4/1/17   Samul Boxer IV, MD   RENVELA 800 mg Tab tab TAKE 1 TABLET BY MOUTH FIVE TIMES DAILY WITH MEALS AND SNACKS 1/3/13   Milla Scruggs MD       No Known Allergies    Social Hx:         He smoked cigarettes for many years. There is no history of use of illicit drugs.         Family History   Problem Relation Age of Onset    Cancer Mother         LUNG AND COLON    Kidney Disease Mother    [facility] Hypertension Father     Hypertension Sister     Diabetes Brother     Kidney Disease Brother     Diabetes Sister     Diabetes Sister     Anesth Problems Neg Hx        Review of Systems:      General - Neg for fever , chills    CVS - Neg for chest pain    RS - Cough - productive of clear sputum    GI -  Neg for melena , hematochezia    Renal - Anuric    CNS  - neg for syncope    ENT - Neg for sore throat         Objective:    Vitals:    Vitals:    08/09/19 1019   BP: 93/44   Pulse: 81   Resp: 18   Temp: 98.4 °F (36.9 °C)   SpO2: 98%     I&O's:  No intake/output data recorded. Visit Vitals  BP 93/44 (BP 1 Location: Right arm, BP Patient Position: At rest)   Pulse 81   Temp 98.4 °F (36.9 °C)   Resp 18   SpO2 98%           Physical Exam:    Seen in Room 605. His Sister Denise Trinidad was in attendance. General: Comfortable. HEENT:  Sclerae without icterus. Lungs : Clear to auscultation , no wheezes, no rales    CVS: No  S 3 gallop , No pericardial rub    Abdomen: Not distended    Extremities: No leg oedema    R. Forearm ---> AV graft --> bruit was appreciated. Neurologic:  Responding appropriately. Psych: Calm    Laboratory Results:    Lab Results   Component Value Date    BUN 18 05/18/2017     05/18/2017    K 5.2 05/18/2017    CL 95 (L) 05/18/2017    CO2 30 (H) 05/18/2017       Lab Results   Component Value Date    BUN 18 05/18/2017    BUN 12 03/31/2017    BUN 13 05/12/2016    BUN 18 09/15/2015    K 5.2 05/18/2017    K 4.0 03/31/2017    K 4.6 05/12/2016    K 4.2 09/15/2015    K 5.6 (H) 08/16/2011       Lab Results   Component Value Date    WBC 9.1 04/01/2017    RBC 2.61 (L) 04/01/2017    HGB 7.9 (L) 04/01/2017    HCT 24.7 (L) 04/01/2017    MCV 94.6 04/01/2017    MCH 30.3 04/01/2017    RDW 19.6 (H) 04/01/2017     04/01/2017       No results found for: PTH, PHOS    Urine dipstick:   No results found for: COLOR, APPRN, SPGRU, REFSG, RIGO, PROTU, GLUCU, KETU, BILU, UROU, SABRINA, LEUKU, GLUKE, EPSU, BACTU, WBCU, RBCU, CASTS, UCRY        I have reviewed the following:     Pertinent labs. Care Plan discussed with:  Patient, Hospitalist team      Chart reviewed.       Thank you for allowing us to participate in the care of this patient. We will follow patient. Please dont hesitate to call with any questions        Ericka Johnston MD  8/9/2019    Nathan Boogie  [facility] [address] [place]  Phone - [phone]   Fax - [phone]  [email or web address]

## 2019-08-09 NOTE — PROGRESS NOTES
spoke with nurse taking care of patient Matthew Victoriain) and informed her per radiologist we can not use EJ for the exam, nurse informed and not sure if any other line can be started. Also, patient next diaysis is scheduled for [facility] 8/12/19.

## 2019-08-09 NOTE — PROCEDURES
[facility] Dialysis Team [facility]  [phone]    Vitals   Pre   Post   Assessment   Pre   Post     Temp  Temp: 98.4 °F (36.9 °C) (08/09/19 1019)  98.4 LOC  Alert and oriented x4 Alert and oriented x4   HR   Pulse (Heart Rate): 84 (08/09/19 1540) 85 Lungs   Diminished on room air  diminished on room air   B/P   BP: 123/59 (08/09/19 1540) 142/61 Cardiac   B/p wnl  b/p wnl   Resp   Resp Rate: 20 (08/09/19 1540) 20 Skin   intact  intact   Pain level  0 0 Edema  +2  in lower ext's     +2 in lower ext's   Orders:    Duration:   Start:    1537 End:    1910 Total:   3hrs 25 mins   Dialyzer:   Dialyzer/Set Up Inspection: Revaclear (08/09/19 1540)   K Bath:   Dialysate K (mEq/L): 2 (08/09/19 1540)   Ca Bath:   Dialysate CA (mEq/L): 2.5 (08/09/19 1540)   Na/Bicarb:   Dialysate NA (mEq/L): 140 (08/09/19 1540)   Target Fluid Removal:   Goal/Amount of Fluid to Remove (mL): 2000 mL (08/09/19 1540)   Access     Type & Location:   RT arm graft. No signs of infection noted, good thrill and bruit noted. Area cleaned and accessed with 15g needles x2 without any issues.  Good blood flow noted   Labs     Obtained/Reviewed   Critical Results Called   Date when labs were drawn-  Hgb-    HGB   Date Value Ref Range Status   08/09/2019 4.5 (LL) 12.1 - 17.0 g/dL Final     Comment:     RESULTS VERIFIED, PHONED TO AND READ BACK BY  KIMBERLEY FLORES @ 125/[facility]       K-    Potassium   Date Value Ref Range Status   08/09/2019 4.0 3.5 - 5.1 mmol/L Final     Ca-   Calcium   Date Value Ref Range Status   08/09/2019 8.1 (L) 8.5 - 10.1 MG/DL Final     Bun-   BUN   Date Value Ref Range Status   08/09/2019 12 6 - 20 MG/DL Final     Creat-   Creatinine   Date Value Ref Range Status   08/09/2019 5.59 (H) 0.70 - 1.30 MG/DL Final        Medications/ Blood Products Given     Name   Dose   Route and Time     PRBC's 2 units Started at 1823             Blood Volume Processed (BVP):    84.3 Net Fluid   Removed:  1kg   Comments   Time Out Done: 1500  Primary Nurse Rpt Pre: Dinoe Hernandez RN  Primary Nurse Rpt 92 Vangie White RN  Pt Education:ESRD  Care Plan:ESRD  Tx Summary:  1471 dialysis started after consents obtained for treatment. Type and cross drawn at start of treatment  1630 b/p dropped but pt denies any distress or discomfort. 98/47 p 83, UF turned off still awaiting blood for transfusion  1645 b/p better 110/60 p 80, Uf turned back on but goal lowered and will increase it when blood comes available  1730b/p trending down again, Uf turned off, still awaiting blood    1823 1st unit of blood started  1845 1st unit of blood completed, no adverse reactions noted   1846 2nd unit of blood started   1910 2nd unit of blood completed, no adverse reactions noted. Dialysis completed and blood rinsed back. Needles removed and pressure held till bleeding stopped. Dressing appiled to each site, pt stable. Treatment time increased to give long awaited blood tranfusion, Unable to pull target goal due to dropping of blood pressure  Admiting Diagnosis:Blood transfusion  Pt's previous clinic-[place]  Consent signed - Informed Consent Verified: Yes (08/09/19 1540)  D[facility] Consent - obtained  Hepatitis Status- AB 60 7/10/19 from clinic  Machine #- Machine Number: [id number] (08/09/19 1540)  Telemetry status-not on telemetry  Pre-dialysis wt. -

## 2019-08-09 NOTE — CONSULTS
[address] [place] [address] NOTE  [facility]  [phone] NP in-hospital cell phone M-F until 4:30  After 5pm or on weekends, please call  for physician on call        NAME:  Mike Vaughan   :   1950   MRN:   [id number]       Referring Physician: Laura Clayton Date: 2019 3:56 PM    Chief Complaint: anemia     History of Present Illness:  Patient is a 70 y.o. who is seen in consultation at the request of Dr. Karly Molina for anemia. Medical history as listed below includes ESRD on HD, diabetes, and anemia. He is on Plavix, unclear why. He presented for anemia, hgb 4.5. He reports 1-2 soft brown/green bowel movements daily, denies any dark stool or BRBPR. He denies nausea, reflux, abdominal pain, change in bowel habits. No NSAID's or alcohol. +tobacco   M2A 19: ulcer in the mid small bowel, blood throughout second half of study, capsule never reached cecum (plan if patient continues to bleed may need laparotomy)   EGD 2017: small sliding hiatal hernia  Colonoscopy 2017: external hemorrhoids, transverse colon 8 mm tubular adenoma, medium diverticula throughout sigmoid and ascending colon; 5 years    I have reviewed the emergency room note, hospital admission note, notes by all other clinicians who have seen the patient during this hospitalization to date. I have reviewed the problem list and the reason for this hospitalization. I have reviewed the allergies and the medications the patient was taking at home prior to this hospitalization.     PMH:  Past Medical History:   Diagnosis Date    Arthritis     GOUT    Cancer ([facility] [facility]     PROSTATE    Chronic kidney disease     KIDNEY FAILURE    Dialysis patient ([facility] [address] 10/11/2016    ESRD on dialysis ([facility] [address] 2016    Heme positive stool 2017    3/31/17    HTN (hypertension) 2011    Kidney disease 2011  Peripheral vascular disease ([facility] [address] 7/26/2017    S/P transmetatarsal amputation of foot, right (HCC)     Toe amputation status ([facility] [address] 8/23/2016    Type II or unspecified type diabetes mellitus without mention of complication, not stated as uncontrolled     PATIENT DENIES       PSH:  Past Surgical History:   Procedure Laterality Date    HX PROSTATECTOMY  OCT 2015    BIOPSY ONLY    VASCULAR SURGERY PROCEDURE UNLIST  6/21/11    UPPER R CHEST DIALYSIS ACCESS    VASCULAR SURGERY PROCEDURE UNLIST  2011    SHUNT RIGHT FOREARM FOR DIALYSIS CHEST ACCESS REMOVED       Allergies:  No Known Allergies    Home Medications:  Prior to Admission Medications   Prescriptions Last Dose Informant Patient Reported? Taking? PROAIR HFA 90 mcg/actuation inhaler   No No   Sig: INHALE TWO PUFFS BY MOUTH EVERY 4 HOURS AS NEEDED FOR SHORTNESS OF BREATH   RENVELA 800 mg Tab tab   No No   Sig: TAKE 1 TABLET BY MOUTH FIVE TIMES DAILY WITH MEALS AND SNACKS   SPIRIVA WITH HANDIHALER 18 mcg inhalation capsule   No No   Sig: INHALE THE ENTIRE CONTENTS OF 1 CAPSULE ONCE A DAY USING HANDIHALER DEVICE   acetaminophen (TYLENOL) 325 mg tablet   No No   Sig: Take 2 Tabs by mouth every four (4) hours as needed. amLODIPine (NORVASC) 10 mg tablet   No No   Sig: TAKE ONE TABLET BY MOUTH DAILY   clopidogrel (PLAVIX) 75 mg tab   No No   Sig: Take 1 Tab by mouth daily.    hydrALAZINE (APRESOLINE) 25 mg tablet   No No   Sig: TAKE ONE TABLET BY MOUTH TWICE A DAY   omeprazole (PRILOSEC) 20 mg capsule   No No   Sig: TAKE ONE CAPSULE BY MOUTH DAILY      Facility-Administered Medications: None       Hospital Medications:  Current Facility-Administered Medications   Medication Dose Route Frequency    epoetin lenny-epbx (RETACRIT) injection 4,000 Units  4,000 Units SubCUTAneous Q MON, WED & FRI    calcitRIOL (ROCALTROL) capsule 1.75 mcg  1.75 mcg Oral 3 times weekly    0.9% sodium chloride infusion 250 mL  250 mL IntraVENous PRN    sodium chloride (NS) flush 5-40 mL  5-40 mL IntraVENous Q8H    sodium chloride (NS) flush 5-40 mL  5-40 mL IntraVENous PRN    acetaminophen (TYLENOL) tablet 650 mg  650 mg Oral Q4H PRN    ondansetron (ZOFRAN) injection 4 mg  4 mg IntraVENous Q4H PRN       Social History:  Social History     Tobacco Use    Smoking status: Current Every Day Smoker     Packs/day: 2.00     Years: 55.00     Pack years: 110.00    Smokeless tobacco: Never Used   Substance Use Topics    Alcohol use: No     Comment: STOPPED DRINKING IN [address] [place] [address] ALCOHOLIC-QUIT ON HIS OWN       Family History:  Family History   Problem Relation Age of Onset    Cancer Mother         LUNG AND COLON    Kidney Disease Mother     Hypertension Father     Hypertension Sister     Diabetes Brother     Kidney Disease Brother     Diabetes Sister     Diabetes Sister     Anesth Problems Neg Hx        Review of Systems:  Constitutional: negative fever, negative chills, negative weight loss  Eyes:   negative visual changes  ENT:   negative sore throat, tongue or lip swelling  Respiratory:  negative cough, negative dyspnea  Cards:  negative for chest pain, palpitations, lower extremity edema  GI:   See HPI  :  negative for frequency, dysuria  Integument:  negative for rash and pruritus  Heme:  +for easy bruising and gum/nose bleeding  Musculoskeletal:negative for myalgias, back pain and muscle weakness  Neuro:  negative for headaches, dizziness, vertigo  Psych:  negative for feelings of anxiety, depression     Objective:     Patient Vitals for the past 8 hrs:   BP Temp Pulse Resp SpO2   08/09/19 1540 123/59 -- 84 20 --   08/09/19 1424 129/87 -- 87 18 100 %   08/09/19 1019 93/44 98.4 °F (36.9 °C) 81 18 98 %     No intake/output data recorded. No intake/output data recorded.     EXAM:     CONST:  Pleasant male lying in bed, no acute distress   NEURO:  alert and oriented x 3   HEENT: EOMI, no scleral icterus   LUNGS: clear to ausculation, (-) wheeze   CARD:  S1 S2   ABD:  soft, no tenderness, no rebound, bowel sounds (+) all 4 quadrants, no masses, non distended   EXT:  warm   PSYCH: full, not anxious     Data Review     Recent Labs     08/09/19  1146   WBC 8.8   HGB 4.5*   HCT 15.4*        Recent Labs     08/09/19  1146      K 4.0      CO2 33*   BUN 12   CREA 5.59*   GLU 77   PHOS 3.1   CA 8.1*     Recent Labs     08/09/19  1146   ALB 2.5*     No results for input(s): INR, PTP, APTT in the last 72 hours. No lab exists for component: INREXT       Assessment:     · Anemia: hgb 4.5, not iron deficient; midsmall bowel bleeding ulcer on M2A on 7/14/19   · ESRD: nephrology following, on HD and EPO  · On Plavix      Patient Active Problem List   Diagnosis Code    Kidney disease N28.9    ESRD (end stage renal disease) ([facility] [address] [facility] N18.6    Toe amputation status ([facility] [address] J07.693A    Type 2 diabetes, diet controlled ([address] E11.9    ESRD on dialysis ([address] N18.6, Z99.2    Dialysis patient ([address] Z99.2    Other specified anemias D64.89    Severe anemia D64.9    Heme positive stool R19.5    Peripheral vascular disease ([address] I73.9    S/P transmetatarsal amputation of foot, right ([address] Z89.431     Plan:     · Hold Plavix as able   · CT enterography timed with HD   · Increase to BID PPI  · Carafate  · Trend CBC, transfuse as necessary  · Consider repeat M2A if hgb doesn't respond to transfusions  · Patient discussed with and will be seen by Dr. Zaira Handy  · Thank you for allowing me to participate in care of Mendel Macadam     Signed By: Anastacia Lizama NP     8/9/2019  3:56 PM       Gastroenterology Attending Physician attestation statement and comments. This patient was seen and examined by me in a face-to-face visit today. I reviewed the medical record including lab work, imaging and other provider notes. I confirmed the history as described above. I spoke to the patient, reviewed the medical record including lab work, imaging and other provider notes.  I discussed this case in detail with Hilda Romero NP. I formulated an  assessment of this patient and developed a treatment plan. I agree with the above consultation note. I agree with the history, exam and assessment and plan as outlined in the note. I would like to add the following: Per M2A capsule endoscopy note from [facility]:   ulcer in the mid small bowel, blood throughout second half of study, capsule never reached cecum (plan if patient continues to bleed may need laparotomy)    I would advise CT enterography with contrast (timed with HD) to evaluate small bowel and also see if video capsule endoscopy is retained. Will request weekend call team to follow. Ky Cooper MD

## 2019-08-09 NOTE — PROCEDURES
Central Line Placement    Start time: 8/9/2019 2:23 PM  End time: 8/9/2019 2:33 PM  Performed by: Nikolai Arevalo MD  Authorized by: Nikolai Arevalo MD     Indications: vascular access  Preanesthetic Checklist: patient identified, risks and benefits discussed, anesthesia consent, site marked, patient being monitored and timeout performed    Timeout Time: 14:23       Pre-procedure: All elements of maximal sterile barrier technique followed?  Yes    2% Chlorhexidine for cutaneous antisepsis, Hand hygiene performed prior to catheter insertion and Ultrasound guidance    Sterile Ultrasound Technique followed?: No            Procedure:   Prep:  Chlorhexidine and ChloraPrep    Orientation:  Right  Patient position:  Trendelenburg  Catheter type:  Quad lumen  Catheter size:  8 Fr  Catheter length:  16 cm  Number of attempts:  1      Assessment:   Post-procedure:  Catheter secured, sterile dressing applied and sterile dressing with CHG applied  Assessment:  Blood return through all ports, guidewire removal verified, free fluid flow and placement verified by x-ray  Insertion:  Uncomplicated  Patient tolerance:  Patient tolerated the procedure well with no immediate complications  Right  External jugular cannulated

## 2019-08-10 ENCOUNTER — APPOINTMENT (OUTPATIENT)
Dept: CT IMAGING | Age: 69
DRG: 811 | End: 2019-08-10
Attending: HOSPITALIST
Payer: MEDICARE

## 2019-08-10 LAB
ANION GAP SERPL CALC-SCNC: 7 MMOL/L (ref 5–15)
BASOPHILS # BLD: 0 K/UL (ref 0–0.1)
BASOPHILS NFR BLD: 1 % (ref 0–1)
BUN SERPL-MCNC: 7 MG/DL (ref 6–20)
BUN/CREAT SERPL: 2 (ref 12–20)
CALCIUM SERPL-MCNC: 8.1 MG/DL (ref 8.5–10.1)
CHLORIDE SERPL-SCNC: 105 MMOL/L (ref 97–108)
CO2 SERPL-SCNC: 28 MMOL/L (ref 21–32)
CREAT SERPL-MCNC: 3.36 MG/DL (ref 0.7–1.3)
DIFFERENTIAL METHOD BLD: ABNORMAL
EOSINOPHIL # BLD: 0.9 K/UL (ref 0–0.4)
EOSINOPHIL NFR BLD: 10 % (ref 0–7)
ERYTHROCYTE [DISTWIDTH] IN BLOOD BY AUTOMATED COUNT: 16.3 % (ref 11.5–14.5)
GLUCOSE SERPL-MCNC: 95 MG/DL (ref 65–100)
HCT VFR BLD AUTO: 22.4 % (ref 36.6–50.3)
HGB BLD-MCNC: 7 G/DL (ref 12.1–17)
IMM GRANULOCYTES # BLD AUTO: 0.1 K/UL (ref 0–0.04)
IMM GRANULOCYTES NFR BLD AUTO: 1 % (ref 0–0.5)
LYMPHOCYTES # BLD: 1 K/UL (ref 0.8–3.5)
LYMPHOCYTES NFR BLD: 11 % (ref 12–49)
MCH RBC QN AUTO: 28.2 PG (ref 26–34)
MCHC RBC AUTO-ENTMCNC: 31.3 G/DL (ref 30–36.5)
MCV RBC AUTO: 90.3 FL (ref 80–99)
MONOCYTES # BLD: 0.6 K/UL (ref 0–1)
MONOCYTES NFR BLD: 7 % (ref 5–13)
NEUTS SEG # BLD: 6.3 K/UL (ref 1.8–8)
NEUTS SEG NFR BLD: 70 % (ref 32–75)
NRBC # BLD: 0.02 K/UL (ref 0–0.01)
NRBC BLD-RTO: 0.2 PER 100 WBC
PLATELET # BLD AUTO: 198 K/UL (ref 150–400)
PMV BLD AUTO: 10.6 FL (ref 8.9–12.9)
POTASSIUM SERPL-SCNC: 3.8 MMOL/L (ref 3.5–5.1)
RBC # BLD AUTO: 2.48 M/UL (ref 4.1–5.7)
SODIUM SERPL-SCNC: 140 MMOL/L (ref 136–145)
WBC # BLD AUTO: 8.9 K/UL (ref 4.1–11.1)

## 2019-08-10 PROCEDURE — 80048 BASIC METABOLIC PNL TOTAL CA: CPT

## 2019-08-10 PROCEDURE — 74011250637 HC RX REV CODE- 250/637: Performed by: HOSPITALIST

## 2019-08-10 PROCEDURE — 65270000029 HC RM PRIVATE

## 2019-08-10 PROCEDURE — 74011000258 HC RX REV CODE- 258: Performed by: HOSPITALIST

## 2019-08-10 PROCEDURE — 90935 HEMODIALYSIS ONE EVALUATION: CPT

## 2019-08-10 PROCEDURE — 74011000250 HC RX REV CODE- 250: Performed by: HOSPITALIST

## 2019-08-10 PROCEDURE — 94640 AIRWAY INHALATION TREATMENT: CPT

## 2019-08-10 PROCEDURE — 74011250637 HC RX REV CODE- 250/637: Performed by: NURSE PRACTITIONER

## 2019-08-10 PROCEDURE — 74011636320 HC RX REV CODE- 636/320: Performed by: HOSPITALIST

## 2019-08-10 PROCEDURE — 85025 COMPLETE CBC W/AUTO DIFF WBC: CPT

## 2019-08-10 PROCEDURE — 36415 COLL VENOUS BLD VENIPUNCTURE: CPT

## 2019-08-10 PROCEDURE — 74177 CT ABD & PELVIS W/CONTRAST: CPT

## 2019-08-10 RX ORDER — SODIUM CHLORIDE 0.9 % (FLUSH) 0.9 %
10 SYRINGE (ML) INJECTION
Status: COMPLETED | OUTPATIENT
Start: 2019-08-10 | End: 2019-08-10

## 2019-08-10 RX ADMIN — IPRATROPIUM BROMIDE 0.5 MG: 0.5 SOLUTION RESPIRATORY (INHALATION) at 19:02

## 2019-08-10 RX ADMIN — IPRATROPIUM BROMIDE 0.5 MG: 0.5 SOLUTION RESPIRATORY (INHALATION) at 01:42

## 2019-08-10 RX ADMIN — Medication 10 ML: at 11:08

## 2019-08-10 RX ADMIN — SEVELAMER CARBONATE 800 MG: 800 TABLET, FILM COATED ORAL at 12:00

## 2019-08-10 RX ADMIN — SUCRALFATE 1 G: 1 TABLET ORAL at 11:30

## 2019-08-10 RX ADMIN — PANTOPRAZOLE SODIUM 40 MG: 40 TABLET, DELAYED RELEASE ORAL at 07:13

## 2019-08-10 RX ADMIN — Medication 10 ML: at 07:22

## 2019-08-10 RX ADMIN — Medication 10 ML: at 22:00

## 2019-08-10 RX ADMIN — IPRATROPIUM BROMIDE 0.5 MG: 0.5 SOLUTION RESPIRATORY (INHALATION) at 08:02

## 2019-08-10 RX ADMIN — SUCRALFATE 1 G: 1 TABLET ORAL at 07:13

## 2019-08-10 RX ADMIN — SUCRALFATE 1 G: 1 TABLET ORAL at 22:00

## 2019-08-10 RX ADMIN — SEVELAMER CARBONATE 800 MG: 800 TABLET, FILM COATED ORAL at 17:15

## 2019-08-10 RX ADMIN — PANTOPRAZOLE SODIUM 40 MG: 40 TABLET, DELAYED RELEASE ORAL at 17:16

## 2019-08-10 RX ADMIN — IOPAMIDOL 100 ML: 755 INJECTION, SOLUTION INTRAVENOUS at 11:08

## 2019-08-10 RX ADMIN — SODIUM CHLORIDE 100 ML: 900 INJECTION, SOLUTION INTRAVENOUS at 11:08

## 2019-08-10 RX ADMIN — SUCRALFATE 1 G: 1 TABLET ORAL at 17:15

## 2019-08-10 RX ADMIN — Medication 10 ML: at 14:57

## 2019-08-10 RX ADMIN — HYDRALAZINE HYDROCHLORIDE 25 MG: 25 TABLET, FILM COATED ORAL at 17:16

## 2019-08-10 NOTE — ADVANCED PRACTICE NURSE
[facility]  [phone]    Vitals   Pre   Post   Assessment   Pre   Post     Temp  Temp: 97.5 °F (36.4 °C) (08/10/19 1140)  97.4 LOC  A&O x 3 A&O x 3   HR   Pulse (Heart Rate): 86 (08/10/19 1300) 84 Lungs   congestion  clear   B/P   BP: 128/69 (08/10/19 1300) 142/71 Cardiac   regular  regular   Resp   Resp Rate: 18 (08/10/19 1300) 20 Skin   warm  warm   Pain level  Pain Intensity 1: 0 (08/09/19 2218) No pain Edema  generalized     generalized   Orders:    Duration:   Start:    1148 End:    1350 Total:   2 hr   Dialyzer:   Dialyzer/Set Up Inspection: Nadya([id number]) (08/10/19 1140)   K Bath:   Dialysate K (mEq/L): 2 (08/10/19 1140)   Ca Bath:   Dialysate CA (mEq/L): 3.0 (08/10/19 1140)   Na/Bicarb:   Dialysate NA (mEq/L): 140 (08/10/19 1140)   Target Fluid Removal:   Goal/Amount of Fluid to Remove (mL): 1000 mL (08/10/19 1140)   Access     Type & Location:   LLA AVF   Labs     Obtained/Reviewed   Critical Results Called   Date when labs were drawn-  Hgb-    HGB   Date Value Ref Range Status   08/10/2019 7.0 (L) 12.1 - 17.0 g/dL Final     K-    Potassium   Date Value Ref Range Status   08/10/2019 3.8 3.5 - 5.1 mmol/L Final     Ca-   Calcium   Date Value Ref Range Status   08/10/2019 8.1 (L) 8.5 - 10.1 MG/DL Final     Bun-   BUN   Date Value Ref Range Status   08/10/2019 7 6 - 20 MG/DL Final     Creat-   Creatinine   Date Value Ref Range Status   08/10/2019 3.36 (H) 0.70 - 1.30 MG/DL Final     Comment:     INVESTIGATED PER DELTA CHECK PROTOCOL        Medications/ Blood Products Given     Name   Dose   Route and Time                     Blood Volume Processed (BVP):    45 L Net Fluid   Removed:  1000 ml   Comments   Time Out Done: yes, 1140  Primary Nurse Rpt Pre: DOMINIQUE RN  Primary Nurse Rpt Post:Yadi CHU  Pt Education: yes, r/t dialysis process  Care Plan: continue HD as ordered  Tx Summary: tolerated tx well, at end, all bl;ood in circuit returned with 300 ml NS, RLA AVF patent, B&T present, bleeding stopped at both sites, pressure dressing in place. Admiting Diagnosis:renal failure  Pt's previous clinic-  Consent signed - Informed Consent Verified: Yes (08/10/19 1140)  Moniqueita Consent - yes  Hepatitis Status- negative  Machine #- Machine Number: [id number] (08/10/19 1140)  Telemetry status- N/A  Pre-dialysis wt. -

## 2019-08-10 NOTE — PROGRESS NOTES
Name: Shayan Marquez MRN: [id number]   : 1950 Hospital: [address]   Date: 8/10/2019        IMPRESSION:   ESRD - on HD (M/W/F) at [facility] HD -Had HD yesterday. DM  HTN  Anemia in CKD - with worsening anemia --> ? GI source      PLAN:   Short HD session this morning (8/10/19) --> post enterography  Continue EPO. Subjective/Interval History:       F/U ESRD - 8/10/19       Had the CT enterography earlier. It seems he had a fair night. He was pleased with the improvement in the HB (post PRBCs). Objective:   Vital Signs:    Visit Vitals  /71 (BP 1 Location: Right arm, BP Patient Position: At rest)   Pulse 85   Temp 98.8 °F (37.1 °C)   Resp 19   SpO2 97%       O2 Device: Room air       Temp (24hrs), Av.5 °F (36.9 °C), Min:98.3 °F (36.8 °C), Max:98.8 °F (37.1 °C)       Intake/Output:   Last shift:      No intake/output data recorded. Last 3 shifts:  1901 - 08/10 0700  In: 600   Out: 1000     Intake/Output Summary (Last 24 hours) at 8/10/2019 1142  Last data filed at 2019 1915  Gross per 24 hour   Intake 600 ml   Output 1000 ml   Net -400 ml        Physical Exam:    Seen in Room 605 this morning. His [facility] Nurse and Brother-in-law were present. General:    Calm. Head:   Normocephalic    Neck:  Triple Lumen. Lungs:   No rales , no wheezes. Beckey Conquest Heart:   No S3  Gallop , No pericardial rub  . Abdomen:   Not distended. Extremities: R. Forearm ----> AV shunt --> bruit was present. Psych:   Not anxious or agitated. Neurologic:  Alert and oriented   . DATA:  Labs:  Recent Labs     08/10/19  0252 19  1146    138   K 3.8 4.0    100   CO2 28 33*   BUN 7 12   CREA 3.36* 5.59*   CA 8.1* 8.1*   ALB  --  2.5*   PHOS  --  3.1     Recent Labs     08/10/19  0252 19  1146   WBC 8.9 8.8   HGB 7.0* 4.5*   HCT 22.4* 15.4*    209     No results for input(s): SKYLER, KU, CLU, CREDOROTHY in the last 72 hours.     No lab exists for component: PROU    Total time spent with patient:         Care Plan discussed with:    Mr. Tomas Degree team.       Nelli Bruno MD

## 2019-08-10 NOTE — PROGRESS NOTES
Mr. Black Janusz was not in his room when I rounded this morning. He had gone for CT enterography. Lab review :    HB 7.0     K 3.8       Short HD session today when he returns to the floor. D/W his [facility] Acute D[facility] Nurse. She was at the bedside.

## 2019-08-10 NOTE — PROGRESS NOTES
GI Progress Note (for Devon Villeda)  Jesica Fischer GBA:15/9/1824 VBZ:[id number]   Prim GI: Brandee Kwan MD  PCP: Laura Young MD  Date/Time:  8/10/2019 8:45 AM   Assessment:   · Anemia: hgb 4.5, not iron deficient; midsmall bowel bleeding ulcer on M2A on 7/14/19; Hgb improved to 7 today s/p PRBCs   · ESRD: nephrology following, on HD and EPO  · On Plavix      Plan:   · Hold Plavix as able   · CT enterography timed with HD; this has been scheduled and is pending   · BID PPI  · Carafate  · Trend CBC, transfuse as necessary  · Consider repeat M2A if hgb doesn't respond to transfusions pending CTE results     Subjective:   Pt w/o complaints. Denies overt GI bleeding    Complaint Y/N Description   Abdominal Pain     Hematemesis     Hematochezia     Melena     Constipation     Diarrhea     Dyspepsia     Dysphagia     Jaundiced     Nausea/vomiting       Review of Systems:  Symptom Y/N Comments  Symptom Y/N Comments   Fever/Chills    Chest Pain     Cough    Headaches     Sputum    Joint Pain     SOB/CLAROS    Pruritis/Rash     Tolerating Diet    Other       Could NOT obtain due to:      Objective:   VITALS:   Last 24hrs VS reviewed since prior progress note. Most recent are:  Visit Vitals  /71 (BP 1 Location: Right arm, BP Patient Position: At rest)   Pulse 85   Temp 98.8 °F (37.1 °C)   Resp 19   SpO2 97%       Intake/Output Summary (Last 24 hours) at 8/10/2019 0845  Last data filed at 8/9/2019 1915  Gross per 24 hour   Intake 600 ml   Output 1000 ml   Net -400 ml     PHYSICAL EXAM:  General: WD, WN. Alert, cooperative, no acute distress    HEENT: NC, Atraumatic. PERRLA, EOMI. Anicteric sclerae. Lungs:  CTA Bilaterally. No Wheezing/Rhonchi/Rales. Heart:  Regular  rhythm,  No murmur (), No Rubs, No Gallops  Abdomen: Soft, Non distended, Non tender.  +Bowel sounds, no HSM  Extremities: No c/c/e  Neurologic:  Alert and oriented X 3.   No acute neurological distress     Lab and Radiology Data Reviewed: (see below)    Medications Reviewed: (see below)  PMH/SH reviewed - no change compared to H&P  ________________________________________________________________________  Care Plan discussed with:  Patient x   Family     RN               Consultant:       Sanjana Muniz MD     Procedures: see electronic medical records for all procedures/Xrays and details which were not copied into this note but were reviewed prior to creation of Plan. LABS:  Recent Labs     08/10/19  0252 08/09/19  1146   WBC 8.9 8.8   HGB 7.0* 4.5*   HCT 22.4* 15.4*    209     Recent Labs     08/10/19  0252 08/09/19  1146    138   K 3.8 4.0    100   CO2 28 33*   BUN 7 12   CREA 3.36* 5.59*   GLU 95 77   CA 8.1* 8.1*   PHOS  --  3.1     Recent Labs     08/09/19  1146   ALB 2.5*     No results for input(s): INR, PTP, APTT in the last 72 hours. No lab exists for component: INREXT   Recent Labs     08/09/19  1146   TIBC 303   PSAT 66*   FERR 147      Lab Results   Component Value Date/Time    Folate 18.8 03/31/2017 03:46 PM     No results for input(s): PH, PCO2, PO2 in the last 72 hours. No results for input(s): CPK, CKMB in the last 72 hours.     No lab exists for component: TROPONINI  No results found for: COLOR, APPRN, SPGRU, REFSG, RIGO, PROTU, GLUCU, KETU, BILU, UROU, SABRINA, LEUKU, GLUKE, EPSU, BACTU, WBCU, RBCU, CASTS, UCRY    MEDICATIONS:  Current Facility-Administered Medications   Medication Dose Route Frequency    calcitRIOL (ROCALTROL) capsule 1.75 mcg  1.75 mcg Oral 3 times weekly    0.9% sodium chloride infusion 250 mL  250 mL IntraVENous PRN    sodium chloride (NS) flush 5-40 mL  5-40 mL IntraVENous Q8H    sodium chloride (NS) flush 5-40 mL  5-40 mL IntraVENous PRN    ondansetron (ZOFRAN) injection 4 mg  4 mg IntraVENous Q4H PRN    acetaminophen (TYLENOL) tablet 650 mg  650 mg Oral Q4H PRN    amLODIPine (NORVASC) tablet 10 mg  10 mg Oral DAILY    hydrALAZINE (APRESOLINE) tablet 25 mg  25 mg Oral BID    sevelamer carbonate (RENVELA) tab 800 mg  800 mg Oral TID WITH MEALS    ipratropium (ATROVENT) 0.02 % nebulizer solution 0.5 mg  0.5 mg Nebulization Q6H RT    albuterol (PROVENTIL VENTOLIN) nebulizer solution 2.5 mg  2.5 mg Nebulization Q4H PRN    sevelamer carbonate (RENVELA) tab 800 mg  800 mg Oral BID PRN    sucralfate (CARAFATE) tablet 1 g  1 g Oral AC&HS    pantoprazole (PROTONIX) tablet 40 mg  40 mg Oral ACB&D    epoetin lenny-epbx (RETACRIT) injection 8,000 Units  8,000 Units SubCUTAneous Q MON, WED & FRI

## 2019-08-10 NOTE — PROGRESS NOTES
Bedside shift change report given to Victor M Delacruz RN (oncoming nurse) by Reggie Gamez (offgoing nurse). Report included the following information SBAR and MAR.     08- 0800 a.m. - TC to CT department to advise that a 20G PIV was initiated in patient's left arm and patient will be able to complete the ordered CT scan. CT dept will send up barium contrast for the patient to consume.

## 2019-08-10 NOTE — PROGRESS NOTES
Hospitalist Progress Note  Savanah Reyes MD  Answering service: [phone] OR [phone] from in house phone      Date of Service:  8/10/2019  NAME:  Mendel Macadam  :  1950  MRN:  [id number]      Admission Summary:   Mendel Macadam is a 70 y.o. male  With H/o ESRD. Dm2 who presents with Abnormal labs as a direct admit from dialysis center. Was notified to take over case from Renal Service. He went for regular HD session at his usual place today and was noted to have a low hemoglobin and thus was sent over here for admission. Interval history / Subjective:     Patient Was seen in followup of anemia  He was seen in HD, He denies any bleeding       Assessment & Plan:     1. Anemia with Slo GI bleed- Chronic blodo Loss anemia - CT enterography ordered. HD after. GI following. S/p 2 units PRBC on . Monitor Hg and transfuse if <7  2. ESRD- on HD per renal  3. Anemia of chronic Disease - Epoiten per renal   4. HTn - stable, c/w home Meds      Code status: Full  DVT prophylaxis: SCD    Telemetry reviewed:   normal sinus rhythm      Risk of deterioration: medium      Total time spent with patient: 30 Minutes   Care Plan discussed with: Patient/Family and Nurse  Disposition: TBD  Explained in detail about disease process and plan of care, and patient/family understand and are in agreement with plan. Hospital Problems  Date Reviewed: 2017          Codes Class Noted POA    ESRD (end stage renal disease) ([facility] [address] ICD-10-CM: N18.6  ICD-9-CM: 585.6  2011 Unknown                Review of Systems:   Review of Systems   Constitutional: Negative. HENT: Negative. Eyes: Negative. Respiratory: Negative. Cardiovascular: Negative. Gastrointestinal: Negative. Genitourinary: Negative. Musculoskeletal: Negative. Skin: Negative. Neurological: Negative. Endo/Heme/Allergies: Negative.     Psychiatric/Behavioral: Negative. Vital Signs:    Last 24hrs VS reviewed since prior progress note. Most recent are:  Visit Vitals  /71 (BP 1 Location: Right arm, BP Patient Position: At rest)   Pulse 85   Temp 98.8 °F (37.1 °C)   Resp 19   SpO2 97%         Intake/Output Summary (Last 24 hours) at 8/10/2019 1115  Last data filed at 8/9/2019 1915  Gross per 24 hour   Intake 600 ml   Output 1000 ml   Net -400 ml        Physical Examination:             Constitutional:  No acute distress, cooperative, pleasant    ENT:  Oral mucous moist, oropharynx benign. Neck supple,    Resp:  CTA bilaterally. No wheezing/rhonchi/rales. No accessory muscle use   CV:  Regular rhythm, normal rate, no murmurs, gallops, rubs    GI:  Soft, non distended, non tender. normoactive bowel sounds, no hepatosplenomegaly     Musculoskeletal:  No edema, warm, 2+ pulses throughout    Neurologic:  Moves all extremities. AAOx3, CN II-XII reviewed     Skin:  Good turgor, no rashes or ulcers       Data Review:    Review and/or order of clinical lab test  Review and/or order of tests in the radiology section of CPT  Review and/or order of tests in the medicine section of CPT      Labs:     Recent Labs     08/10/19  0252 08/09/19  1146   WBC 8.9 8.8   HGB 7.0* 4.5*   HCT 22.4* 15.4*    209     Recent Labs     08/10/19  0252 08/09/19  1146    138   K 3.8 4.0    100   CO2 28 33*   BUN 7 12   CREA 3.36* 5.59*   GLU 95 77   CA 8.1* 8.1*   PHOS  --  3.1     Recent Labs     08/09/19  1146   ALB 2.5*     No results for input(s): INR, PTP, APTT in the last 72 hours. No lab exists for component: INREXT   Recent Labs     08/09/19  1146   TIBC 303   PSAT 66*   FERR 147      Lab Results   Component Value Date/Time    Folate 18.8 03/31/2017 03:46 PM      No results for input(s): PH, PCO2, PO2 in the last 72 hours. No results for input(s): CPK, CKNDX, TROIQ in the last 72 hours.     No lab exists for component: CPKMB  No results found for: CHOL, [facility], CHLST, CHOLV, HDL, LDL, LDLC, DLDLP, TGLX, TRIGL, TRIGP, CHHD, CHHDX  Lab Results   Component Value Date/Time    Glucose (POC) 112 (H) 04/01/2017 06:46 AM    Glucose (POC) 99 03/31/2017 04:39 PM    Glucose (POC) 83 08/16/2011 08:12 AM    Glucose  (A) 08/02/2012 03:45 PM     No results found for: COLOR, APPRN, SPGRU, REFSG, RIGO, PROTU, GLUCU, KETU, BILU, UROU, SABRINA, LEUKU, GLUKE, EPSU, BACTU, WBCU, RBCU, CASTS, UCRY      Medications Reviewed:     Current Facility-Administered Medications   Medication Dose Route Frequency    calcitRIOL (ROCALTROL) capsule 1.75 mcg  1.75 mcg Oral 3 times weekly    0.9% sodium chloride infusion 250 mL  250 mL IntraVENous PRN    sodium chloride (NS) flush 5-40 mL  5-40 mL IntraVENous Q8H    sodium chloride (NS) flush 5-40 mL  5-40 mL IntraVENous PRN    ondansetron (ZOFRAN) injection 4 mg  4 mg IntraVENous Q4H PRN    acetaminophen (TYLENOL) tablet 650 mg  650 mg Oral Q4H PRN    amLODIPine (NORVASC) tablet 10 mg  10 mg Oral DAILY    hydrALAZINE (APRESOLINE) tablet 25 mg  25 mg Oral BID    sevelamer carbonate (RENVELA) tab 800 mg  800 mg Oral TID WITH MEALS    ipratropium (ATROVENT) 0.02 % nebulizer solution 0.5 mg  0.5 mg Nebulization Q6H RT    albuterol (PROVENTIL VENTOLIN) nebulizer solution 2.5 mg  2.5 mg Nebulization Q4H PRN    sevelamer carbonate (RENVELA) tab 800 mg  800 mg Oral BID PRN    sucralfate (CARAFATE) tablet 1 g  1 g Oral AC&HS    pantoprazole (PROTONIX) tablet 40 mg  40 mg Oral ACB&D    epoetin lenny-epbx (RETACRIT) injection 8,000 Units  8,000 Units SubCUTAneous Q MON, WED & FRI     ______________________________________________________________________  EXPECTED LENGTH OF STAY: 3d 16h  ACTUAL LENGTH OF STAY:          1                 Dylan Conner MD   Patient has given Verbal permission to discuss medical care with   persons present in the room and and also with contact as listed on face sheet.

## 2019-08-11 LAB
ANION GAP SERPL CALC-SCNC: 4 MMOL/L (ref 5–15)
BASOPHILS # BLD: 0.1 K/UL (ref 0–0.1)
BASOPHILS NFR BLD: 1 % (ref 0–1)
BUN SERPL-MCNC: 9 MG/DL (ref 6–20)
BUN/CREAT SERPL: 2 (ref 12–20)
CALCIUM SERPL-MCNC: 8.2 MG/DL (ref 8.5–10.1)
CHLORIDE SERPL-SCNC: 100 MMOL/L (ref 97–108)
CO2 SERPL-SCNC: 33 MMOL/L (ref 21–32)
CREAT SERPL-MCNC: 4.36 MG/DL (ref 0.7–1.3)
DIFFERENTIAL METHOD BLD: ABNORMAL
EOSINOPHIL # BLD: 1.1 K/UL (ref 0–0.4)
EOSINOPHIL NFR BLD: 11 % (ref 0–7)
ERYTHROCYTE [DISTWIDTH] IN BLOOD BY AUTOMATED COUNT: 17.5 % (ref 11.5–14.5)
GLUCOSE SERPL-MCNC: 83 MG/DL (ref 65–100)
HCT VFR BLD AUTO: 22.6 % (ref 36.6–50.3)
HGB BLD-MCNC: 7 G/DL (ref 12.1–17)
IMM GRANULOCYTES # BLD AUTO: 0.1 K/UL (ref 0–0.04)
IMM GRANULOCYTES NFR BLD AUTO: 1 % (ref 0–0.5)
LYMPHOCYTES # BLD: 1 K/UL (ref 0.8–3.5)
LYMPHOCYTES NFR BLD: 10 % (ref 12–49)
MCH RBC QN AUTO: 28.3 PG (ref 26–34)
MCHC RBC AUTO-ENTMCNC: 31 G/DL (ref 30–36.5)
MCV RBC AUTO: 91.5 FL (ref 80–99)
MONOCYTES # BLD: 0.6 K/UL (ref 0–1)
MONOCYTES NFR BLD: 6 % (ref 5–13)
NEUTS SEG # BLD: 7.5 K/UL (ref 1.8–8)
NEUTS SEG NFR BLD: 71 % (ref 32–75)
NRBC # BLD: 0 K/UL (ref 0–0.01)
NRBC BLD-RTO: 0 PER 100 WBC
PLATELET # BLD AUTO: 196 K/UL (ref 150–400)
PMV BLD AUTO: 10.3 FL (ref 8.9–12.9)
POTASSIUM SERPL-SCNC: 3.8 MMOL/L (ref 3.5–5.1)
RBC # BLD AUTO: 2.47 M/UL (ref 4.1–5.7)
RBC MORPH BLD: ABNORMAL
SODIUM SERPL-SCNC: 137 MMOL/L (ref 136–145)
WBC # BLD AUTO: 10.4 K/UL (ref 4.1–11.1)

## 2019-08-11 PROCEDURE — 65270000029 HC RM PRIVATE

## 2019-08-11 PROCEDURE — 94640 AIRWAY INHALATION TREATMENT: CPT

## 2019-08-11 PROCEDURE — 74011250637 HC RX REV CODE- 250/637: Performed by: NURSE PRACTITIONER

## 2019-08-11 PROCEDURE — 85025 COMPLETE CBC W/AUTO DIFF WBC: CPT

## 2019-08-11 PROCEDURE — 94760 N-INVAS EAR/PLS OXIMETRY 1: CPT

## 2019-08-11 PROCEDURE — 74011250637 HC RX REV CODE- 250/637: Performed by: HOSPITALIST

## 2019-08-11 PROCEDURE — 80048 BASIC METABOLIC PNL TOTAL CA: CPT

## 2019-08-11 PROCEDURE — 74011000250 HC RX REV CODE- 250: Performed by: HOSPITALIST

## 2019-08-11 PROCEDURE — 36415 COLL VENOUS BLD VENIPUNCTURE: CPT

## 2019-08-11 RX ADMIN — IPRATROPIUM BROMIDE 0.5 MG: 0.5 SOLUTION RESPIRATORY (INHALATION) at 14:30

## 2019-08-11 RX ADMIN — IPRATROPIUM BROMIDE 0.5 MG: 0.5 SOLUTION RESPIRATORY (INHALATION) at 19:07

## 2019-08-11 RX ADMIN — HYDRALAZINE HYDROCHLORIDE 25 MG: 25 TABLET, FILM COATED ORAL at 08:22

## 2019-08-11 RX ADMIN — SEVELAMER CARBONATE 800 MG: 800 TABLET, FILM COATED ORAL at 12:14

## 2019-08-11 RX ADMIN — HYDRALAZINE HYDROCHLORIDE 25 MG: 25 TABLET, FILM COATED ORAL at 17:03

## 2019-08-11 RX ADMIN — PANTOPRAZOLE SODIUM 40 MG: 40 TABLET, DELAYED RELEASE ORAL at 07:28

## 2019-08-11 RX ADMIN — Medication 10 ML: at 14:23

## 2019-08-11 RX ADMIN — Medication 10 ML: at 22:00

## 2019-08-11 RX ADMIN — PANTOPRAZOLE SODIUM 40 MG: 40 TABLET, DELAYED RELEASE ORAL at 17:03

## 2019-08-11 RX ADMIN — AMLODIPINE BESYLATE 10 MG: 5 TABLET ORAL at 08:22

## 2019-08-11 RX ADMIN — SUCRALFATE 1 G: 1 TABLET ORAL at 12:14

## 2019-08-11 RX ADMIN — SUCRALFATE 1 G: 1 TABLET ORAL at 17:03

## 2019-08-11 RX ADMIN — IPRATROPIUM BROMIDE 0.5 MG: 0.5 SOLUTION RESPIRATORY (INHALATION) at 01:56

## 2019-08-11 RX ADMIN — SUCRALFATE 1 G: 1 TABLET ORAL at 22:00

## 2019-08-11 RX ADMIN — IPRATROPIUM BROMIDE 0.5 MG: 0.5 SOLUTION RESPIRATORY (INHALATION) at 07:45

## 2019-08-11 RX ADMIN — SEVELAMER CARBONATE 800 MG: 800 TABLET, FILM COATED ORAL at 08:06

## 2019-08-11 RX ADMIN — SUCRALFATE 1 G: 1 TABLET ORAL at 07:27

## 2019-08-11 RX ADMIN — SEVELAMER CARBONATE 800 MG: 800 TABLET, FILM COATED ORAL at 17:03

## 2019-08-11 RX ADMIN — Medication 10 ML: at 05:48

## 2019-08-11 NOTE — PROGRESS NOTES
Hospitalist Progress Note  Ella Rose MD  Answering service: [phone] from in house phone      Date of Service:  2019  NAME:  Cathy Butler  :  1950  MRN:  [id number]      Admission Summary:   Cathy Butler is a 70 y.o. male  With H/o ESRD. Dm2 who presents with Abnormal labs as a direct admit from dialysis center. Was notified to take over case from Renal Service. He went for regular HD session at his usual place today and was noted to have a low hemoglobin and thus was sent over here for admission. Interval history / Subjective:     Patient Was seen in followup of anemia  He was seen in the room  Denies any bleeding. Assessment & Plan:     1. Anemia with Slo GI bleed- Chronic blodo Loss anemia - CT enterography ordered - no s.o bleedign noted. . HD after. GI following. S/p 2 units PRBC on . Monitor Hg and transfuse if <7., For possible repeat  Capsule per GI     2. ESRD- on HD per renal  3. Anemia of chronic Disease - Epoiten per renal   4. HTn - stable, c/w home Meds      Code status: Full  DVT prophylaxis: SCD    Telemetry reviewed:   normal sinus rhythm      Risk of deterioration: medium      Total time spent with patient: 30 Minutes   Care Plan discussed with: Patient/Family and Nurse  Disposition: Home w/Family and once finished all procedures  Explained in detail about disease process and plan of care, and patient/family understand and are in agreement with plan. Hospital Problems  Date Reviewed: 2017          Codes Class Noted POA    [facility] ICD-10-CM: N18.6  ICD-9-CM: 585.6  2011 Unknown                Review of Systems:   Review of Systems   Constitutional: Negative. HENT: Negative. Eyes: Negative. Respiratory: Negative. Cardiovascular: Negative. Gastrointestinal: Negative. Genitourinary: Negative. Musculoskeletal: Negative. Skin: Negative. Neurological: Negative. Endo/Heme/Allergies: Negative. Psychiatric/Behavioral: Negative. Vital Signs:    Last 24hrs VS reviewed since prior progress note. Most recent are:  Visit Vitals  /75 (BP 1 Location: Right arm, BP Patient Position: At rest)   Pulse 96   Temp 98.9 °F (37.2 °C)   Resp 19   SpO2 97%         Intake/Output Summary (Last 24 hours) at 8/11/2019 1988  Last data filed at 8/10/2019 1348  Gross per 24 hour   Intake --   Output 1000 ml   Net -1000 ml        Physical Examination:             Constitutional:  No acute distress, cooperative, pleasant    ENT:  Oral mucous moist, oropharynx benign. Neck supple,    Resp:  CTA bilaterally. No wheezing/rhonchi/rales. No accessory muscle use   CV:  Regular rhythm, normal rate, no murmurs, gallops, rubs    GI:  Soft, non distended, non tender. normoactive bowel sounds, no hepatosplenomegaly     Musculoskeletal:  No edema, warm, 2+ pulses throughout    Neurologic:  Moves all extremities. AAOx3, CN II-XII reviewed     Skin:  Good turgor, no rashes or ulcers       Data Review:    Review and/or order of clinical lab test  Review and/or order of tests in the radiology section of CPT  Review and/or order of tests in the medicine section of CPT      Labs:     Recent Labs     08/11/19  0547 08/10/19  0252   WBC 10.4 8.9   HGB 7.0* 7.0*   HCT 22.6* 22.4*    198     Recent Labs     08/11/19  0547 08/10/19  0252 08/09/19  1146    140 138   K 3.8 3.8 4.0    105 100   CO2 33* 28 33*   BUN 9 7 12   CREA 4.36* 3.36* 5.59*   GLU 83 95 77   CA 8.2* 8.1* 8.1*   PHOS  --   --  3.1     Recent Labs     08/09/19  1146   ALB 2.5*     No results for input(s): INR, PTP, APTT in the last 72 hours.     No lab exists for component: INREXT, INREXT   Recent Labs     08/09/19  1146   TIBC 303   PSAT 66*   FERR 147      Lab Results   Component Value Date/Time    Folate 18.8 03/31/2017 03:46 PM      No results for input(s): PH, PCO2, PO2 in the last 72 hours. No results for input(s): CPK, CKNDX, TROIQ in the last 72 hours.     No lab exists for component: CPKMB  No results found for: CHOL, CHOLX, CHLST, CHOLV, HDL, LDL, LDLC, DLDLP, TGLX, TRIGL, TRIGP, CHHD, CHHDX  Lab Results   Component Value Date/Time    Glucose (POC) 112 (H) 04/01/2017 06:46 AM    Glucose (POC) 99 03/31/2017 04:39 PM    Glucose (POC) 83 08/16/2011 08:12 AM    Glucose  (A) 08/02/2012 03:45 PM     No results found for: COLOR, APPRN, SPGRU, REFSG, RIGO, PROTU, GLUCU, KETU, BILU, UROU, SABRINA, LEUKU, GLUKE, EPSU, BACTU, WBCU, RBCU, CASTS, UCRY      Medications Reviewed:     Current Facility-Administered Medications   Medication Dose Route Frequency    calcitRIOL (ROCALTROL) capsule 1.75 mcg  1.75 mcg Oral 3 times weekly    0.9% sodium chloride infusion 250 mL  250 mL IntraVENous PRN    sodium chloride (NS) flush 5-40 mL  5-40 mL IntraVENous Q8H    sodium chloride (NS) flush 5-40 mL  5-40 mL IntraVENous PRN    ondansetron (ZOFRAN) injection 4 mg  4 mg IntraVENous Q4H PRN    acetaminophen (TYLENOL) tablet 650 mg  650 mg Oral Q4H PRN    amLODIPine (NORVASC) tablet 10 mg  10 mg Oral DAILY    hydrALAZINE (APRESOLINE) tablet 25 mg  25 mg Oral BID    sevelamer carbonate (RENVELA) tab 800 mg  800 mg Oral TID WITH MEALS    ipratropium (ATROVENT) 0.02 % nebulizer solution 0.5 mg  0.5 mg Nebulization Q6H RT    albuterol (PROVENTIL VENTOLIN) nebulizer solution 2.5 mg  2.5 mg Nebulization Q4H PRN    sevelamer carbonate (RENVELA) tab 800 mg  800 mg Oral BID PRN    sucralfate (CARAFATE) tablet 1 g  1 g Oral AC&HS    pantoprazole (PROTONIX) tablet 40 mg  40 mg Oral ACB&D    epoetin lenny-epbx (RETACRIT) injection 8,000 Units  8,000 Units SubCUTAneous Q MON, WED & FRI     ______________________________________________________________________  EXPECTED LENGTH OF STAY: 3d 16h  ACTUAL LENGTH OF STAY:          2                 Josefina De La Rosa MD   Patient has given Verbal permission to discuss medical care with   persons present in the room and and also with contact as listed on face sheet.

## 2019-08-11 NOTE — PROGRESS NOTES
GI Progress Note (for Blayne Sheikh)  Ralph Diaz BKW:[birth year] CRAWFORD:[id number]   Prim GI: Francisco Ugalde MD  PCP: Vickie Summers MD  Date/Time:  8/11/2019 12:57 PM   Assessment:   · Anemia: hgb 4.5, not iron deficient; midsmall bowel bleeding ulcer on M2A on 7/14/19; Hgb improved and stable at 7 today s/p PRBCs   · ESRD: nephrology following, on HD and EPO  · On Plavix      Plan:   · Hold Plavix as able   · CT enterography personally reviewed with Radiology. No e/o retained capsule and no sight of bleeding  · BID PPI  · Carafate  · Trend CBC, transfuse as necessary  · Will make NPO after midnight for possible repeat M2A; will defer to Dr. Blayne Sheikh to resume care of patient tomorrow     Subjective:   Pt doing well. No bleeding    Complaint Y/N Description   Abdominal Pain     Hematemesis     Hematochezia     Melena     Constipation     Diarrhea     Dyspepsia     Dysphagia     Jaundiced     Nausea/vomiting       Review of Systems:  Symptom Y/N Comments  Symptom Y/N Comments   Fever/Chills    Chest Pain     Cough    Headaches     Sputum    Joint Pain     SOB/CLAROS    Pruritis/Rash     Tolerating Diet    Other       Could NOT obtain due to:      Objective:   VITALS:   Last 24hrs VS reviewed since prior progress note. Most recent are:  Visit Vitals  /75 (BP 1 Location: Right arm, BP Patient Position: At rest)   Pulse 96   Temp 98.9 °F (37.2 °C)   Resp 19   SpO2 97%       Intake/Output Summary (Last 24 hours) at 8/11/2019 1257  Last data filed at 8/10/2019 1348  Gross per 24 hour   Intake --   Output 1000 ml   Net -1000 ml     PHYSICAL EXAM:  General: WD, WN. Alert, cooperative, no acute distress    HEENT: NC, Atraumatic. PERRLA, EOMI. Anicteric sclerae. Lungs:  CTA Bilaterally. No Wheezing/Rhonchi/Rales.   Heart:  Regular  rhythm,  No murmur (), No Rubs, No Gallops  Abdomen: Soft, Non distended, Non tender.  +Bowel sounds, no HSM  Extremities: No c/c/e  Neurologic:  No acute neurological distress     Lab and Radiology Data Reviewed: (see below)    Medications Reviewed: (see below)  PMH/SH reviewed - no change compared to H&P  ________________________________________________________________________    Care Plan discussed with:  Patient x   Family     RN               Consultant:       Bria Palacio MD     Procedures: see electronic medical records for all procedures/Xrays and details which were not copied into this note but were reviewed prior to creation of Plan. LABS:  Recent Labs     08/11/19 0547 08/10/19  0252   WBC 10.4 8.9   HGB 7.0* 7.0*   HCT 22.6* 22.4*    198     Recent Labs     08/11/19  0547 08/10/19  0252 08/09/19  1146    140 138   K 3.8 3.8 4.0    105 100   CO2 33* 28 33*   BUN 9 7 12   CREA 4.36* 3.36* 5.59*   GLU 83 95 77   CA 8.2* 8.1* 8.1*   PHOS  --   --  3.1     Recent Labs     08/09/19  1146   ALB 2.5*     No results for input(s): INR, PTP, APTT in the last 72 hours. No lab exists for component: INREXT   Recent Labs     08/09/19  1146   TIBC 303   PSAT 66*   FERR 147      Lab Results   Component Value Date/Time    Folate 18.8 03/31/2017 03:46 PM     No results for input(s): PH, PCO2, PO2 in the last 72 hours. No results for input(s): CPK, CKMB in the last 72 hours.     No lab exists for component: TROPONINI  No results found for: COLOR, APPRN, SPGRU, REFSG, RIGO, PROTU, GLUCU, KETU, BILU, UROU, SABRINA, LEUKU, GLUKE, EPSU, BACTU, WBCU, RBCU, CASTS, UCRY    MEDICATIONS:  Current Facility-Administered Medications   Medication Dose Route Frequency    calcitRIOL (ROCALTROL) capsule 1.75 mcg  1.75 mcg Oral 3 times weekly    0.9% sodium chloride infusion 250 mL  250 mL IntraVENous PRN    sodium chloride (NS) flush 5-40 mL  5-40 mL IntraVENous Q8H    sodium chloride (NS) flush 5-40 mL  5-40 mL IntraVENous PRN    ondansetron (ZOFRAN) injection 4 mg  4 mg IntraVENous Q4H PRN    acetaminophen (TYLENOL) tablet 650 mg  650 mg Oral Q4H PRN    amLODIPine (NORVASC) tablet 10 mg  10 mg Oral DAILY    hydrALAZINE (APRESOLINE) tablet 25 mg  25 mg Oral BID    sevelamer carbonate (RENVELA) tab 800 mg  800 mg Oral TID WITH MEALS    ipratropium (ATROVENT) 0.02 % nebulizer solution 0.5 mg  0.5 mg Nebulization Q6H RT    albuterol (PROVENTIL VENTOLIN) nebulizer solution 2.5 mg  2.5 mg Nebulization Q4H PRN    sevelamer carbonate (RENVELA) tab 800 mg  800 mg Oral BID PRN    sucralfate (CARAFATE) tablet 1 g  1 g Oral AC&HS    pantoprazole (PROTONIX) tablet 40 mg  40 mg Oral ACB&D    epoetin lenny-epbx (RETACRIT) injection 8,000 Units  8,000 Units SubCUTAneous Q MON, WED & FRI

## 2019-08-12 ENCOUNTER — APPOINTMENT (OUTPATIENT)
Dept: CT IMAGING | Age: 69
DRG: 811 | End: 2019-08-12
Attending: HOSPITALIST
Payer: MEDICARE

## 2019-08-12 LAB
ANION GAP SERPL CALC-SCNC: 7 MMOL/L (ref 5–15)
BACTERIA SPEC CULT: ABNORMAL
BACTERIA SPEC CULT: ABNORMAL
BASOPHILS # BLD: 0 K/UL (ref 0–0.1)
BASOPHILS NFR BLD: 0 % (ref 0–1)
BUN SERPL-MCNC: 11 MG/DL (ref 6–20)
BUN/CREAT SERPL: 3 (ref 12–20)
CALCIUM SERPL-MCNC: 7.9 MG/DL (ref 8.5–10.1)
CHLORIDE SERPL-SCNC: 105 MMOL/L (ref 97–108)
CO2 SERPL-SCNC: 28 MMOL/L (ref 21–32)
CREAT SERPL-MCNC: 3.69 MG/DL (ref 0.7–1.3)
DIFFERENTIAL METHOD BLD: ABNORMAL
EOSINOPHIL # BLD: 0.7 K/UL (ref 0–0.4)
EOSINOPHIL NFR BLD: 7 % (ref 0–7)
ERYTHROCYTE [DISTWIDTH] IN BLOOD BY AUTOMATED COUNT: 18.1 % (ref 11.5–14.5)
GLUCOSE SERPL-MCNC: 85 MG/DL (ref 65–100)
HCT VFR BLD AUTO: 19.8 % (ref 36.6–50.3)
HCT VFR BLD AUTO: 20.9 % (ref 36.6–50.3)
HCT VFR BLD AUTO: 26.1 % (ref 36.6–50.3)
HGB BLD-MCNC: 6.1 G/DL (ref 12.1–17)
HGB BLD-MCNC: 6.3 G/DL (ref 12.1–17)
HGB BLD-MCNC: 8.2 G/DL (ref 12.1–17)
IMM GRANULOCYTES # BLD AUTO: 0.1 K/UL (ref 0–0.04)
IMM GRANULOCYTES NFR BLD AUTO: 1 % (ref 0–0.5)
LYMPHOCYTES # BLD: 0.8 K/UL (ref 0.8–3.5)
LYMPHOCYTES NFR BLD: 8 % (ref 12–49)
MCH RBC QN AUTO: 28.3 PG (ref 26–34)
MCHC RBC AUTO-ENTMCNC: 30.1 G/DL (ref 30–36.5)
MCV RBC AUTO: 93.7 FL (ref 80–99)
MONOCYTES # BLD: 0.3 K/UL (ref 0–1)
MONOCYTES NFR BLD: 3 % (ref 5–13)
NEUTS SEG # BLD: 7.7 K/UL (ref 1.8–8)
NEUTS SEG NFR BLD: 81 % (ref 32–75)
NRBC # BLD: 0 K/UL (ref 0–0.01)
NRBC BLD-RTO: 0 PER 100 WBC
PLATELET # BLD AUTO: 158 K/UL (ref 150–400)
PMV BLD AUTO: 10.5 FL (ref 8.9–12.9)
POTASSIUM SERPL-SCNC: 3.3 MMOL/L (ref 3.5–5.1)
RBC # BLD AUTO: 2.23 M/UL (ref 4.1–5.7)
RBC MORPH BLD: ABNORMAL
SERVICE CMNT-IMP: ABNORMAL
SODIUM SERPL-SCNC: 140 MMOL/L (ref 136–145)
WBC # BLD AUTO: 9.6 K/UL (ref 4.1–11.1)
WBC MORPH BLD: ABNORMAL

## 2019-08-12 PROCEDURE — 74011000250 HC RX REV CODE- 250: Performed by: NURSE PRACTITIONER

## 2019-08-12 PROCEDURE — 74011250637 HC RX REV CODE- 250/637: Performed by: NURSE PRACTITIONER

## 2019-08-12 PROCEDURE — P9016 RBC LEUKOCYTES REDUCED: HCPCS

## 2019-08-12 PROCEDURE — 74178 CT ABD&PLV WO CNTR FLWD CNTR: CPT

## 2019-08-12 PROCEDURE — 74011250636 HC RX REV CODE- 250/636: Performed by: INTERNAL MEDICINE

## 2019-08-12 PROCEDURE — 90935 HEMODIALYSIS ONE EVALUATION: CPT

## 2019-08-12 PROCEDURE — 94760 N-INVAS EAR/PLS OXIMETRY 1: CPT

## 2019-08-12 PROCEDURE — 65270000029 HC RM PRIVATE

## 2019-08-12 PROCEDURE — 74011636320 HC RX REV CODE- 636/320: Performed by: RADIOLOGY

## 2019-08-12 PROCEDURE — 36415 COLL VENOUS BLD VENIPUNCTURE: CPT

## 2019-08-12 PROCEDURE — 94640 AIRWAY INHALATION TREATMENT: CPT

## 2019-08-12 PROCEDURE — 74011000258 HC RX REV CODE- 258: Performed by: RADIOLOGY

## 2019-08-12 PROCEDURE — 85018 HEMOGLOBIN: CPT

## 2019-08-12 PROCEDURE — 80048 BASIC METABOLIC PNL TOTAL CA: CPT

## 2019-08-12 PROCEDURE — 74011000250 HC RX REV CODE- 250: Performed by: HOSPITALIST

## 2019-08-12 PROCEDURE — 85025 COMPLETE CBC W/AUTO DIFF WBC: CPT

## 2019-08-12 RX ORDER — SODIUM CHLORIDE 0.9 % (FLUSH) 0.9 %
10 SYRINGE (ML) INJECTION
Status: COMPLETED | OUTPATIENT
Start: 2019-08-12 | End: 2019-08-12

## 2019-08-12 RX ORDER — SODIUM CHLORIDE 9 MG/ML
250 INJECTION, SOLUTION INTRAVENOUS AS NEEDED
Status: DISCONTINUED | OUTPATIENT
Start: 2019-08-12 | End: 2019-08-14 | Stop reason: HOSPADM

## 2019-08-12 RX ADMIN — POLYETHYLENE GLYCOL-3350 AND ELECTROLYTES 2000 ML: 236; 6.74; 5.86; 2.97; 22.74 POWDER, FOR SOLUTION ORAL at 13:02

## 2019-08-12 RX ADMIN — EPOETIN ALFA-EPBX 8000 UNITS: 4000 INJECTION, SOLUTION INTRAVENOUS; SUBCUTANEOUS at 22:17

## 2019-08-12 RX ADMIN — SODIUM CHLORIDE 100 ML: 900 INJECTION, SOLUTION INTRAVENOUS at 20:51

## 2019-08-12 RX ADMIN — IPRATROPIUM BROMIDE 0.5 MG: 0.5 SOLUTION RESPIRATORY (INHALATION) at 01:57

## 2019-08-12 RX ADMIN — Medication 10 ML: at 20:51

## 2019-08-12 RX ADMIN — Medication 10 ML: at 13:22

## 2019-08-12 RX ADMIN — IOPAMIDOL 100 ML: 755 INJECTION, SOLUTION INTRAVENOUS at 20:52

## 2019-08-12 RX ADMIN — IPRATROPIUM BROMIDE 0.5 MG: 0.5 SOLUTION RESPIRATORY (INHALATION) at 19:20

## 2019-08-12 RX ADMIN — Medication 10 ML: at 22:18

## 2019-08-12 RX ADMIN — PANTOPRAZOLE SODIUM 40 MG: 40 TABLET, DELAYED RELEASE ORAL at 06:34

## 2019-08-12 RX ADMIN — SUCRALFATE 1 G: 1 TABLET ORAL at 06:34

## 2019-08-12 RX ADMIN — IPRATROPIUM BROMIDE 0.5 MG: 0.5 SOLUTION RESPIRATORY (INHALATION) at 13:28

## 2019-08-12 RX ADMIN — Medication 10 ML: at 06:34

## 2019-08-12 RX ADMIN — IPRATROPIUM BROMIDE 0.5 MG: 0.5 SOLUTION RESPIRATORY (INHALATION) at 07:29

## 2019-08-12 NOTE — PROGRESS NOTES
Name: Jos Larson MRN: [id number]   : 1950 Hospital: . [address]   Date: 2019        IMPRESSION:   · ESRD on HD. Patient is tolerating the HD well today. · Critical anemia from a GI bleed (small bowel)  · Secondary hyperparathyroidism from ESRD. PLAN:   · Complete a full HD treatment today. Next HD- on Wednesday. · Check PTH and PO4 level. · Agree with another unit of RBC  · Capsule study as per GI     Subjective/Interval History:   I have reviewed the flowsheet and previous days notes. ROS:Pertinent items are noted in HPI. \"I did not see any blood in my stool today\". Patient denies any issues. Objective:   Vital Signs:    Visit Vitals  /70   Pulse 94   Temp 98.4 °F (36.9 °C)   Resp 16   Wt 79.9 kg (176 lb 3.2 oz)   SpO2 95%   BMI 26.79 kg/m²       O2 Device: Room air       Temp (24hrs), Av.4 °F (36.9 °C), Min:98.1 °F (36.7 °C), Max:98.7 °F (37.1 °C)       Intake/Output:   Last shift:       0701 -  1900  In: -   Out: 1000   Last 3 shifts: No intake/output data recorded. Intake/Output Summary (Last 24 hours) at 2019 1242  Last data filed at 2019 1140  Gross per 24 hour   Intake --   Output 1000 ml   Net -1000 ml        Physical Exam:  General:    Alert, cooperative,HD in progress, no distress, appears stated age. Head:   Normocephalic, without obvious abnormality, atraumatic. Eyes:   Conjunctivae/corneas clear. Lungs:   Clear to auscultation bilaterally. No Wheezing or Rhonchi. No rales. Chest wall:  No tenderness or deformity. No Accessory muscle use. Heart:   Regular rate and rhythm,  no murmur, rub or gallop. Abdomen:   Soft, non-tender. Not distended. Bowel sounds normal. No masses  Extremities: Extremities normal, atraumatic, No cyanosis. No edema. No clubbing  Skin:     Texture, turgor normal. No rashes or lesions. Not Jaundiced  Psych:  Good insight. Not depressed. Not anxious or agitated.   Neurologic: Normal strength, Alert and oriented X 3. DATA:  Labs:  Recent Labs     08/12/19  0939 08/11/19  0547 08/10/19  0252    137 140   K 3.3* 3.8 3.8    100 105   CO2 28 33* 28   BUN 11 9 7   CREA 3.69* 4.36* 3.36*   CA 7.9* 8.2* 8.1*     Recent Labs     08/12/19  0939 08/11/19  0547 08/10/19  0252   WBC 9.6 10.4 8.9   HGB 6.3* 7.0* 7.0*   HCT 20.9* 22.6* 22.4*    196 198     No results for input(s): SKYLER, KU, CLU, CREAU in the last 72 hours.     No lab exists for component: PROU    Total time spent with patient:  35 minutes    [] Critical Care Provided    Care Plan discussed with:   Jasvir Call MD

## 2019-08-12 NOTE — DIALYSIS
TRANSFER - IN REPORT:    Verbal report received from Vicki on [facility]  being received from [facility] for dialysis      Report consisted of patients Situation, Background, Assessment and   Recommendations(SBAR). Information from the following report(s) SBAR was reviewed with the receiving nurse. Opportunity for questions and clarification was provided. Assessment completed upon patients arrival to unit and care assumed.

## 2019-08-12 NOTE — PROCEDURES
HD TRANSFER - OUT REPORT:    Verbal report given to Vicki CHU on [facility]       Report consisted of patient's Situation, Background, Assessment and   Recommendations(SBAR). Information from the following report(s) SBAR was reviewed with the receiving nurse. Method:  $$ Method: Hemodialysis (08/12/19 8894)    Fluid Removed  NET Fluid Removed (mL): 1000 ml (08/12/19 1140)     Patient response to treatment:  Fair    End Time  Hemodialysis End Time: 0047 (08/12/19 1140)  If not documented, dialysis nurse to update post-dialysis row in HD/Filtration flowsheet     Medications /Volume expansion agents or Fluid boluses administered during treatment? Yes    Post-dialysis medication administration due? Yes  Remind nurse to administer post-HD medication upon return to unit. Fistula hemostasis? Yes    Line heparinization? No    Lines:RLU AVG    Opportunity for questions and clarification was provided.       Patient transported with:NA

## 2019-08-12 NOTE — PROGRESS NOTES
Patient refused to get H&H draw until now. Had bloody stools after drinking Golytely. MD notified. Pending orders. Patient is not symptomatic.    H&H just drawn and sent to lab    H&H at 1620 was 8.2  Blood transfusion cancelled   Patient will get CT to r/o GI bleeding

## 2019-08-12 NOTE — PROCEDURES
[facility] Dialysis Team [facility]  [phone]    Vitals   Pre   Post   Assessment   Pre   Post     Temp  Temp: 98.2 °F (36.8 °C) (08/12/19 0828)  98.3 LOC  AOX3 Same   HR   Pulse (Heart Rate): 90 (08/12/19 0828) 91 Lungs   Diminished at bases  Same   B/P   BP: 135/77 (08/12/19 0828) 136/70 Cardiac   Regular Same   Resp   Resp Rate: 16 (08/12/19 0828) 16 Skin   Amputated BL toes  Same   Pain level  Pain Intensity 1: 0 (08/11/19 2319) 0 Edema  None Same   Orders:    Duration:   Start:    0830 End:    1140 Total:   3 hrs   Dialyzer:   Dialyzer/Set Up Inspection: Jimmie Leyva (08/12/19 0828)   K Bath:   Dialysate K (mEq/L): 2 (08/12/19 0828)   Ca Bath:   Dialysate CA (mEq/L): 2.5 (08/12/19 0828)   Na/Bicarb:   Dialysate NA (mEq/L): 140 (08/12/19 0828)   Target Fluid Removal:   Goal/Amount of Fluid to Remove (mL): 2000 mL (08/12/19 0828)   Access     Type & Location:   RLA Graft cleansed with alcohol. B+T present. Cannulated with a 15 g needle. No S/S of infection.    Labs     Obtained/Reviewed   Critical Results Called   Date when labs were drawn-  Hgb-    HGB   Date Value Ref Range Status   08/11/2019 7.0 (L) 12.1 - 17.0 g/dL Final     K-    Potassium   Date Value Ref Range Status   08/11/2019 3.8 3.5 - 5.1 mmol/L Final     Ca-   Calcium   Date Value Ref Range Status   08/11/2019 8.2 (L) 8.5 - 10.1 MG/DL Final     Bun-   BUN   Date Value Ref Range Status   08/11/2019 9 6 - 20 MG/DL Final     Creat-   Creatinine   Date Value Ref Range Status   08/11/2019 4.36 (H) 0.70 - 1.30 MG/DL Final     Comment:     INVESTIGATED PER DELTA CHECK PROTOCOL        Medications/ Blood Products Given     Name   Dose   Route and Time     PRBC- ml  IVG @ 1045              Blood Volume Processed (BVP):    73.1L Net Fluid   Removed:  2000 ml   Comments   Time Out Done: Yes 0815  Primary Nurse Rpt Pre: Vicki CHU   Primary Nurse Rpt Post:  Pt Education: Tx modalities  Care Plan: Continuous  Tx Summary:  0730 Arrived Pt's room. Machine setup and tested. Report received from primary nurse. Assessment  Completed. RLA Graft cleansed with alcohol. B+T present. Cannulated with a 15 g needle. No S/S of infection. 0830 Tx. started with pre VSS  0845 Vascular access visible. Line connection intact. Pt stable. 0900 Vascular access visible. Line connection intact. Pt stable. 4875 Vascular access visible. Art pressure elevated to 280. BFR reduced to 470. aMnav Motto Pt stable. 0930 Vascular access visible. Line connection intact. Pt stable. 0945 Vascular access visible. Line connection intact. BP dropped to 86/43 but pt non symptomatic. UF turned off.  1000 UF off.  1015 BP low UF off  1030 UF off  1045 Pt stable. Vascular access visible. 1100 Access visible. Line connection intact. Blood initiated. Pre VSS stable  1115 Pt stable. No adverse reaction to blood  1130 Access visible. Blood transfusing. Pt stable. 1140 Blood transfusion completed. Tx ended with all possible blood returned from circuit. Hemostasis achieved in less than 10 mins. Site secured with gauze and tape. Report given to primary nurse and pt left in room with bed at its lowest position and CB within reach. Admiting Diagnosis: ESRD  Pt's previous clinic- [facility]  Consent signed - Informed Consent Verified: Yes (08/12/19 0828)  D[facility]pti Consent - Yes on file  Hepatitis Status- Ag Neg 08/05/19 Ab Immune 08/05/19  Machine #- Machine Number: [id number] (08/12/19 3027)  Telemetry status- No bedside tele  Pre-dialysis wt. - Pre-Dialysis Weight: 79.9 kg (176 lb 2.4 oz) (08/12/19 0828)

## 2019-08-12 NOTE — PROGRESS NOTES
Pt reported to be having bloody stools per staff now. D/W Dr Rob Anna - Recommends CTA abd GI bleed scan - R/o Gi bleeding source.    D/W Dr Bob Davila for CTA   D/W  IR Dr Maria Ines Restrepo

## 2019-08-12 NOTE — WOUND CARE
Wound Consult:  New Patient Visit. Chart reviewed. Consulted for sacrum and right outer foot. In this a.m while on dialysis and evaluated feet but could not turn fully to assess buttocks/sacrum so returned this afternoon. Patient is resting on a [employer] bed. He is alert and oriented. Assessment:  Right lateral mid foot - dark dry callus noted; no drainage. Left lateral mid foot - dry callus noted, no drainage. Bilateral TMA on feet - left with areas of light callus medially noted, no drainage. Buttocks -  Intact, no redness or injury noted. Sacrum - intact, no redness / discoloration or injury noted. Treatment:  Moisturized feet with aloe vesta ointment; and applied to buttocks as well. Wound Recommendations:  Patient tells me he sees foot doctor routinely and his calluses are shaved back. No local care indicated at this time. Skin Care Recommendations:  1. Minimize friction/shear: minimize layers of linen/pads under patient. 2. Off load pressure/reposition: continue to turn and reposition approximately every 2 hours; float heels as needed - patient moves independently side to side in bed. 3. Manage Moisture - keep skin folds dry; promote continence - denies incontinence. 4. Continue to monitor nutrition, pain, and skin risk scale, and skin assessment. Plan: Will sign off; please re-consult if needed.   James Osullivan, [address] Office [phone]  Pager [phone]

## 2019-08-12 NOTE — PROGRESS NOTES
Hospitalist Progress Note  Marimar Yepez MD  Answering service: [phone] from in house phone      Date of Service:  2019  NAME:  Gisella Danielle  :  1950  MRN:  [id number]      Admission Summary:   Gisella Danielle is a 70 y.o. male  With H/o ESRD. Dm2 who presents with Abnormal labs as a direct admit from dialysis center. Was notified to take over case from Renal Service. He went for regular HD session at his usual place today and was noted to have a low hemoglobin and thus was sent over here for admission. Interval history / Subjective:     Patient Was seen in followup of anemia  He was seen in the room  Denies any bleeding. Received 1 unit of prbc today        Assessment & Plan:     1. Anemia with Slo GI bleed- Chronic blood Loss anemia - CT enterography 8/10  - no s.o bleedign noted. GI following. S/p 2 units PRBC on . Received  1 more unit   Monitor Hg and transfuse if <7., For possible repeat  Capsule per GI tomorrow    2. ESRD- on HD per renal  3. Anemia of chronic Disease - Epoiten per renal   4. HTn - stable, c/w home Meds      Code status: Full  DVT prophylaxis: SCD    Telemetry reviewed:   normal sinus rhythm      Risk of deterioration: medium      Total time spent with patient: 30 Minutes   Care Plan discussed with: Patient/Family and Nurse  Disposition: Home w/Family and once finished all procedures  Explained in detail about disease process and plan of care, and patient/family understand and are in agreement with plan. Hospital Problems  Date Reviewed: 2017          Codes Class Noted POA    ESRD (end stage renal disease) ([facility] ICD-10-CM: N18.6  ICD-9-CM: 585.6  2011 Unknown                Review of Systems:   Review of Systems   Constitutional: Negative. HENT: Negative. Eyes: Negative. Respiratory: Negative. Cardiovascular: Negative. Gastrointestinal: Negative. Genitourinary: Negative. Musculoskeletal: Negative. Skin: Negative. Neurological: Negative. Endo/Heme/Allergies: Negative. Psychiatric/Behavioral: Negative. Vital Signs:    Last 24hrs VS reviewed since prior progress note. Most recent are:  Visit Vitals  /77 (BP Patient Position: At rest)   Pulse 85   Temp 98 °F (36.7 °C)   Resp 17   Wt 79.9 kg (176 lb 3.2 oz)   SpO2 96%   BMI 26.79 kg/m²         Intake/Output Summary (Last 24 hours) at 8/12/2019 1659  Last data filed at 8/12/2019 1140  Gross per 24 hour   Intake --   Output 1000 ml   Net -1000 ml        Physical Examination:             Constitutional:  No acute distress, cooperative, pleasant    ENT:  Oral mucous moist, oropharynx benign. Neck supple,    Resp:  CTA bilaterally. No wheezing/rhonchi/rales. No accessory muscle use   CV:  Regular rhythm, normal rate, no murmurs, gallops, rubs    GI:  Soft, non distended, non tender. normoactive bowel sounds, no hepatosplenomegaly     Musculoskeletal:  No edema, warm, 2+ pulses throughout    Neurologic:  Moves all extremities. AAOx3, CN II-XII reviewed     Skin:  Good turgor, no rashes or ulcers       Data Review:    Review and/or order of clinical lab test  Review and/or order of tests in the radiology section of CPT  Review and/or order of tests in the medicine section of CPT      Labs:     Recent Labs     08/12/19  1306 08/12/19  0939 08/11/19  0547   WBC  --  9.6 10.4   HGB 6.1* 6.3* 7.0*   HCT 19.8* 20.9* 22.6*   PLT  --  158 196     Recent Labs     08/12/19  0939 08/11/19  0547 08/10/19  0252    137 140   K 3.3* 3.8 3.8    100 105   CO2 28 33* 28   BUN 11 9 7   CREA 3.69* 4.36* 3.36*   GLU 85 83 95   CA 7.9* 8.2* 8.1*     No results for input(s): SGOT, GPT, ALT, AP, TBIL, TBILI, TP, ALB, GLOB, GGT, AML, LPSE in the last 72 hours. No lab exists for component: AMYP, HLPSE  No results for input(s): INR, PTP, APTT in the last 72 hours.     No lab exists for component: INREXT, INREXT   No results for input(s): FE, TIBC, PSAT, FERR in the last 72 hours. Lab Results   Component Value Date/Time    Folate 18.8 03/31/2017 03:46 PM      No results for input(s): PH, PCO2, PO2 in the last 72 hours. No results for input(s): CPK, CKNDX, TROIQ in the last 72 hours.     No lab exists for component: CPKMB  No results found for: CHOL, CHOLX, CHLST, CHOLV, HDL, LDL, LDLC, DLDLP, TGLX, TRIGL, TRIGP, CHHD, CHHDX  Lab Results   Component Value Date/Time    Glucose (POC) 112 (H) 04/01/2017 06:46 AM    Glucose (POC) 99 03/31/2017 04:39 PM    Glucose (POC) 83 08/16/2011 08:12 AM    Glucose  (A) 08/02/2012 03:45 PM     No results found for: COLOR, APPRN, SPGRU, REFSG, RIGO, PROTU, GLUCU, KETU, BILU, UROU, SABRINA, LEUKU, GLUKE, EPSU, BACTU, WBCU, RBCU, CASTS, UCRY      Medications Reviewed:     Current Facility-Administered Medications   Medication Dose Route Frequency    0.9% sodium chloride infusion 250 mL  250 mL IntraVENous PRN    calcitRIOL (ROCALTROL) capsule 1.75 mcg  1.75 mcg Oral 3 times weekly    0.9% sodium chloride infusion 250 mL  250 mL IntraVENous PRN    sodium chloride (NS) flush 5-40 mL  5-40 mL IntraVENous Q8H    sodium chloride (NS) flush 5-40 mL  5-40 mL IntraVENous PRN    ondansetron (ZOFRAN) injection 4 mg  4 mg IntraVENous Q4H PRN    acetaminophen (TYLENOL) tablet 650 mg  650 mg Oral Q4H PRN    amLODIPine (NORVASC) tablet 10 mg  10 mg Oral DAILY    hydrALAZINE (APRESOLINE) tablet 25 mg  25 mg Oral BID    sevelamer carbonate (RENVELA) tab 800 mg  800 mg Oral TID WITH MEALS    ipratropium (ATROVENT) 0.02 % nebulizer solution 0.5 mg  0.5 mg Nebulization Q6H RT    albuterol (PROVENTIL VENTOLIN) nebulizer solution 2.5 mg  2.5 mg Nebulization Q4H PRN    sevelamer carbonate (RENVELA) tab 800 mg  800 mg Oral BID PRN    sucralfate (CARAFATE) tablet 1 g  1 g Oral AC&HS    pantoprazole (PROTONIX) tablet 40 mg  40 mg Oral ACB&D    epoetin lenny-epbx (RETACRIT) injection 8,000 Units  8,000 Units SubCUTAneous Q MON, WED & FRI     ______________________________________________________________________  EXPECTED LENGTH OF STAY: 3d 16h  ACTUAL LENGTH OF STAY:          3                 Nicho Esquivel MD   Patient has given Verbal permission to discuss medical care with   persons present in the room and and also with contact as listed on face sheet.

## 2019-08-12 NOTE — PROGRESS NOTES
[address]  [address] [facility], [address]       GI PROGRESS NOTE  Kimmie Muro, [facility] office  [phone] NP in-hospital cell phone M-F until 4:30  After 5pm or on weekends, please call  for physician on call      NAME: Arianne Gilbert   :  1950   MRN:  [id number]       Subjective:   He denies complaint, no abdominal pain or signs of GI blood loss. Objective:     VITALS:   Last 24hrs VS reviewed since prior progress note. Most recent are:  Visit Vitals  /61   Pulse (!) 103   Temp 98.2 °F (36.8 °C)   Resp 18   Wt 79.9 kg (176 lb 3.2 oz)   SpO2 95%   BMI 26.79 kg/m²       PHYSICAL EXAM:  General: Cooperative, no acute distress    Neurologic:  Alert and oriented X 3. HEENT: EOMI, no scleral icterus   Lungs:  No increased WOB  Heart:  S1 S2   Abdomen: Soft, non-distended, no tenderness. +Bowel sounds  Extremities: warm  Psych:   Good insight. Not anxious or agitated. Lab Data Reviewed:     No results found for this or any previous visit (from the past 24 hour(s)).          Assessment:   · Anemia: hgb 7 on 19, not iron deficient; midsmall bowel bleeding ulcer on M2A on 19; CT enterography negative  · ESRD: nephrology following, on HD and EPO  · On Plavix PTA     Patient Active Problem List   Diagnosis Code    Kidney disease N28.9    ESRD (end stage renal disease) ([address] N18.6    Toe amputation status ([address] K59.139F    Type 2 diabetes, diet controlled ([address] E11.9    ESRD on dialysis ([address] N18.6, Z99.2    Dialysis patient ([address] Z99.2    Other specified anemias D64.89    Severe anemia D64.9    Heme positive stool R19.5    Peripheral vascular disease ([address] I73.9    S/P transmetatarsal amputation of foot, right ([address] Z89.431     Plan:   · Hold Plavix as able  · Monitor CBC, transfuse as necessary  · BID PPI and carafate  · 2L bowel prep capsule to follow     Signed By: Satish Diaz NP     2019  10:14 AM         GI Attending: Agree with above plan for M2A capsule endoscopy. Will follow along with you. Ky Parkinson MD

## 2019-08-12 NOTE — PROGRESS NOTES
Pt sister, Ms. Dilan Nguyen called for pt update. I informed pt that his sister has called and would like an update, pt confirmed I can give her information. I stated pt is getting and EGD tomorrow to make sure there is no more bleeding and they do not find anymore bleeding. I stated we are continuing to monitor hemoglobin to make sure there is no more bleeding. Sister denied any further questions. Will continue to monitor. Call light in reach and bed in lowest position.

## 2019-08-13 LAB
ABO + RH BLD: NORMAL
ABO + RH BLD: NORMAL
ANION GAP SERPL CALC-SCNC: 5 MMOL/L (ref 5–15)
BASOPHILS # BLD: 0 K/UL (ref 0–0.1)
BASOPHILS NFR BLD: 0 % (ref 0–1)
BLD PROD TYP BPU: NORMAL
BLOOD GROUP ANTIBODIES SERPL: NORMAL
BLOOD GROUP ANTIBODIES SERPL: NORMAL
BPU ID: NORMAL
BUN SERPL-MCNC: 11 MG/DL (ref 6–20)
BUN/CREAT SERPL: 2 (ref 12–20)
CALCIUM SERPL-MCNC: 8.2 MG/DL (ref 8.5–10.1)
CALCIUM SERPL-MCNC: 8.4 MG/DL (ref 8.5–10.1)
CHLORIDE SERPL-SCNC: 103 MMOL/L (ref 97–108)
CO2 SERPL-SCNC: 29 MMOL/L (ref 21–32)
CREAT SERPL-MCNC: 4.64 MG/DL (ref 0.7–1.3)
CROSSMATCH RESULT,%XM: NORMAL
DIFFERENTIAL METHOD BLD: ABNORMAL
EOSINOPHIL # BLD: 1.2 K/UL (ref 0–0.4)
EOSINOPHIL NFR BLD: 12 % (ref 0–7)
ERYTHROCYTE [DISTWIDTH] IN BLOOD BY AUTOMATED COUNT: 17.2 % (ref 11.5–14.5)
GLUCOSE SERPL-MCNC: 84 MG/DL (ref 65–100)
HCT VFR BLD AUTO: 24.8 % (ref 36.6–50.3)
HGB BLD-MCNC: 7.8 G/DL (ref 12.1–17)
IMM GRANULOCYTES # BLD AUTO: 0 K/UL (ref 0–0.04)
IMM GRANULOCYTES NFR BLD AUTO: 0 % (ref 0–0.5)
LYMPHOCYTES # BLD: 0.9 K/UL (ref 0.8–3.5)
LYMPHOCYTES NFR BLD: 9 % (ref 12–49)
MCH RBC QN AUTO: 28.9 PG (ref 26–34)
MCHC RBC AUTO-ENTMCNC: 31.5 G/DL (ref 30–36.5)
MCV RBC AUTO: 91.9 FL (ref 80–99)
MONOCYTES # BLD: 0.6 K/UL (ref 0–1)
MONOCYTES NFR BLD: 6 % (ref 5–13)
NEUTS SEG # BLD: 7 K/UL (ref 1.8–8)
NEUTS SEG NFR BLD: 73 % (ref 32–75)
NRBC # BLD: 0 K/UL (ref 0–0.01)
NRBC BLD-RTO: 0 PER 100 WBC
PLATELET # BLD AUTO: 169 K/UL (ref 150–400)
PMV BLD AUTO: 10.7 FL (ref 8.9–12.9)
POTASSIUM SERPL-SCNC: 4 MMOL/L (ref 3.5–5.1)
PTH-INTACT SERPL-MCNC: 346.7 PG/ML (ref 18.4–88)
RBC # BLD AUTO: 2.7 M/UL (ref 4.1–5.7)
RBC MORPH BLD: ABNORMAL
SODIUM SERPL-SCNC: 137 MMOL/L (ref 136–145)
SPECIMEN EXP DATE BLD: NORMAL
SPECIMEN EXP DATE BLD: NORMAL
STATUS OF UNIT,%ST: NORMAL
UNIT DIVISION, %UDIV: 0
WBC # BLD AUTO: 9.7 K/UL (ref 4.1–11.1)

## 2019-08-13 PROCEDURE — 74011250637 HC RX REV CODE- 250/637: Performed by: NURSE PRACTITIONER

## 2019-08-13 PROCEDURE — 85025 COMPLETE CBC W/AUTO DIFF WBC: CPT

## 2019-08-13 PROCEDURE — 94640 AIRWAY INHALATION TREATMENT: CPT

## 2019-08-13 PROCEDURE — 65270000029 HC RM PRIVATE

## 2019-08-13 PROCEDURE — 74011250637 HC RX REV CODE- 250/637: Performed by: HOSPITALIST

## 2019-08-13 PROCEDURE — 83970 ASSAY OF PARATHORMONE: CPT

## 2019-08-13 PROCEDURE — 74011000250 HC RX REV CODE- 250: Performed by: HOSPITALIST

## 2019-08-13 PROCEDURE — 77030018766 HC KT ENDOSC IMAG GVIM -F: Performed by: INTERNAL MEDICINE

## 2019-08-13 PROCEDURE — 94760 N-INVAS EAR/PLS OXIMETRY 1: CPT

## 2019-08-13 PROCEDURE — 80048 BASIC METABOLIC PNL TOTAL CA: CPT

## 2019-08-13 PROCEDURE — 90935 HEMODIALYSIS ONE EVALUATION: CPT

## 2019-08-13 PROCEDURE — 86900 BLOOD TYPING SEROLOGIC ABO: CPT

## 2019-08-13 PROCEDURE — 76040000019: Performed by: INTERNAL MEDICINE

## 2019-08-13 PROCEDURE — 36415 COLL VENOUS BLD VENIPUNCTURE: CPT

## 2019-08-13 RX ADMIN — IPRATROPIUM BROMIDE 0.5 MG: 0.5 SOLUTION RESPIRATORY (INHALATION) at 01:04

## 2019-08-13 RX ADMIN — SUCRALFATE 1 G: 1 TABLET ORAL at 17:55

## 2019-08-13 RX ADMIN — Medication 10 ML: at 15:57

## 2019-08-13 RX ADMIN — IPRATROPIUM BROMIDE 0.5 MG: 0.5 SOLUTION RESPIRATORY (INHALATION) at 20:09

## 2019-08-13 RX ADMIN — IPRATROPIUM BROMIDE 0.5 MG: 0.5 SOLUTION RESPIRATORY (INHALATION) at 13:39

## 2019-08-13 RX ADMIN — SEVELAMER CARBONATE 800 MG: 800 TABLET, FILM COATED ORAL at 17:55

## 2019-08-13 RX ADMIN — PANTOPRAZOLE SODIUM 40 MG: 40 TABLET, DELAYED RELEASE ORAL at 17:55

## 2019-08-13 RX ADMIN — HYDRALAZINE HYDROCHLORIDE 25 MG: 25 TABLET, FILM COATED ORAL at 17:55

## 2019-08-13 NOTE — PROGRESS NOTES
Reason for Admission:   Patient in house due to low hemoglobin while at HD center. CM met with patient to discuss discharge plan. Patient was pleasant alert and oriented. Lives with daughters in his apartment. Patient uses walker for assistance and uses no home oxygen, is on room air. Patient drives his private vehicle to and from and brother in law will transport at discharge Patient confirmed PCP is Dr. Opal Dent and uses [facility] in [place]. Patient advised had home health many years ago but doesn't remember the name of the agency. CM will follow. RRAT Score:   15               Do you (patient/family) have any concerns for transition/discharge? No concerns at this time              Plan for utilizing home health:   No plan for home health at this time. Current Advanced Directive/Advance Care Plan:  Not on file. Transition of Care Plan:        1. CM monitoring patient for disposition-patient likely to return home with family. 2. Patients brother in law will transport at discharge.     CM will follow   MADDIE Tyler/BERONICA

## 2019-08-13 NOTE — PROGRESS NOTES
SB3 Capsule placed successfully. ID [id number]. Lot [id number]. Exp 11/05/20. Report given to [facility].

## 2019-08-13 NOTE — PROGRESS NOTES
Problem: Falls - Risk of  Goal: *Absence of Falls  Description  Document Isacc Boeck Fall Risk and appropriate interventions in the flowsheet.   Outcome: Progressing Towards Goal  Note:   Fall Risk Interventions:  Mobility Interventions: Utilize walker, cane, or other assistive device, Strengthening exercises (ROM-active/passive), Communicate number of staff needed for ambulation/transfer    Medication Interventions: Evaluate medications/consider consulting pharmacy, Patient to call before getting OOB    Elimination Interventions: Call light in reach, Patient to call for help with toileting needs

## 2019-08-13 NOTE — PROGRESS NOTES
[address]  Juanita Griffith, [address]       GI PROGRESS NOTE  [facility] office  [phone] NP in-hospital cell phone M-F until 4:30  After 5pm or on weekends, please call  for physician on call      NAME: Anita Orozco   :  1950   MRN:  [id number]       Subjective:    He had some blood with wiping after bowel movement yesterday. No further signs of bleeding and negative CT. No abdominal pain. Objective:     VITALS:   Last 24hrs VS reviewed since prior progress note. Most recent are:  Visit Vitals  /76   Pulse 86   Temp 98.1 °F (36.7 °C)   Resp 18   Wt 79.9 kg (176 lb 3.2 oz)   SpO2 99%   BMI 26.79 kg/m²       PHYSICAL EXAM:  General: Cooperative, no acute distress    Neurologic:  Alert and oriented X 3. HEENT: EOMI, no scleral icterus   Lungs:  No increased WOB  Heart:  S1 S2   Abdomen: Soft, non-distended, no tenderness. +Bowel sounds  Extremities: warm  Psych:   Good insight. Not anxious or agitated.     Lab Data Reviewed:     Recent Results (from the past 24 hour(s))   HGB & HCT    Collection Time: 19  1:06 PM   Result Value Ref Range    HGB 6.1 (L) 12.1 - 17.0 g/dL    HCT 19.8 (L) 36.6 - 50.3 %   HGB & HCT    Collection Time: 19  6:17 PM   Result Value Ref Range    HGB 8.2 (L) 12.1 - 17.0 g/dL    HCT 26.1 (L) 36.6 - 50.3 %   TYPE & SCREEN    Collection Time: 19  2:52 AM   Result Value Ref Range    Crossmatch Expiration 2019     ABO/Rh(D) O POSITIVE     Antibody screen NEG    METABOLIC PANEL, BASIC    Collection Time: 19  2:54 AM   Result Value Ref Range    Sodium 137 136 - 145 mmol/L    Potassium 4.0 3.5 - 5.1 mmol/L    Chloride 103 97 - 108 mmol/L    CO2 29 21 - 32 mmol/L    Anion gap 5 5 - 15 mmol/L    Glucose 84 65 - 100 mg/dL    BUN 11 6 - 20 MG/DL    Creatinine 4.64 (H) 0.70 - 1.30 MG/DL    BUN/Creatinine ratio 2 (L) 12 - 20      GFR est AA 15 (L) >60 ml/min/1.73m2    GFR est non-AA 13 (L) >60 ml/min/1.73m2    Calcium 8.2 (L) 8.5 - 10.1 MG/DL   PTH INTACT    Collection Time: 08/13/19  2:55 AM   Result Value Ref Range    Calcium 8.4 (L) 8.5 - 10.1 MG/DL    PTH, Intact 346.7 (H) 18.4 - 88.0 pg/mL   CBC WITH AUTOMATED DIFF    Collection Time: 08/13/19  2:56 AM   Result Value Ref Range    WBC 9.7 4.1 - 11.1 K/uL    RBC 2.70 (L) 4.10 - 5.70 M/uL    HGB 7.8 (L) 12.1 - 17.0 g/dL    HCT 24.8 (L) 36.6 - 50.3 %    MCV 91.9 80.0 - 99.0 FL    MCH 28.9 26.0 - 34.0 PG    MCHC 31.5 30.0 - 36.5 g/dL    RDW 17.2 (H) 11.5 - 14.5 %    PLATELET 575 260 - 861 K/uL    MPV 10.7 8.9 - 12.9 FL    NRBC 0.0 0  WBC    ABSOLUTE NRBC 0.00 0.00 - 0.01 K/uL    NEUTROPHILS 73 32 - 75 %    LYMPHOCYTES 9 (L) 12 - 49 %    MONOCYTES 6 5 - 13 %    EOSINOPHILS 12 (H) 0 - 7 %    BASOPHILS 0 0 - 1 %    IMMATURE GRANULOCYTES 0 0.0 - 0.5 %    ABS. NEUTROPHILS 7.0 1.8 - 8.0 K/UL    ABS. LYMPHOCYTES 0.9 0.8 - 3.5 K/UL    ABS. MONOCYTES 0.6 0.0 - 1.0 K/UL    ABS. EOSINOPHILS 1.2 (H) 0.0 - 0.4 K/UL    ABS. BASOPHILS 0.0 0.0 - 0.1 K/UL    ABS. IMM. GRANS. 0.0 0.00 - 0.04 K/UL    DF SMEAR SCANNED      RBC COMMENTS POLYCHROMASIA  1+        RBC COMMENTS TARGET CELLS  PRESENT        RBC COMMENTS ANISOCYTOSIS  1+         IMPRESSION:  1. No active GI bleed. 2. End-stage renal cyst kidneys with left renal 1.5 cm possible neoplasm. 3. Ileus. 4. Extensive atherosclerosis.      Assessment:   · Anemia: hgb 7.8, not iron deficient; midsmall bowel bleeding ulcer on M2A on 7/14/19; CT enterography negative; CT 8/12/19 negative for GIB  · ESRD: nephrology following, on HD and EPO  · On Plavix PTA     Patient Active Problem List   Diagnosis Code    Kidney disease N28.9    ESRD (end stage renal disease) ([facility] N18.6    Toe amputation status ([facility] X52.573N    Type 2 diabetes, diet controlled ([facility] E11.9    ESRD on dialysis ([facility] N18.6, Z99.2    Dialysis patient ([facility] Z99.2    Other specified anemias D64.89    Severe anemia D64.9    Heme positive stool R19.5    Peripheral vascular disease (H[facility]) I73.9    S/P transmetatarsal amputation of foot, right ([facility] Z89.431     Plan:   · Capsule study today  · Hold Plavix as able  · Monitor CBC, transfuse as necessary  · BID PPI and carafate     Signed By: Aicha Mc NP     8/13/2019  10:14 AM       GI Attending: Capsule endoscopy today. Will read study tomorrow after it is downloaded. Ky Gilmore MD

## 2019-08-13 NOTE — PROGRESS NOTES
Hospitalist Progress Note  Madhu Carrasco MD  Answering service: [phone] from in house phone        Date of Service:  2019  NAME:  Pinky Cordova  :  1950  MRN:  [id number]      Admission Summary:   Hilda Vu a 70 y. o. male  With H/o ESRD. Dm2 who presents with Abnormal labs as a direct admit from dialysis center. Was notified to take over case from Renal Service.   He went for regular HD session at his usual place today and was noted to have a low hemoglobin and thus was sent over here for admission. Interval history / Subjective:    No complaints today. Denies any bloody bowel movements. Going for capsule endoscopy today      Assessment & Plan:     Acute on chronic blood loss anemia - s/p CTA (neg), sp capsule endscopy   - GI following  - s/p 1 U PRBC since admission  - Hold plavix   - PPI BID, carafate   - Continue EPO     ESRD on HD -   - Nephrology following, HD per schedule. HTN - home meds       Code status: FULL  DVT prophylaxis: SCDs due to GIB    Care Plan discussed with: Patient/Family  Disposition: Home w/Family, hopefully tomorrow after capsule study is done, and hemoglobin stable. Hospital Problems  Date Reviewed: 2017          Codes Class Noted POA    ESRD ([facility] stage [facility] disease) ([facility] ICD-10-CM: N18.6  ICD-9-CM: 585.6  2011 Unknown                Review of Systems:   A comprehensive review of systems was negative except for that written in the HPI. Vital Signs:    Last 24hrs VS reviewed since prior progress note.  Most recent are:  Visit Vitals  /76   Pulse 86   Temp 98.1 °F (36.7 °C)   Resp 18   Wt 79.9 kg (176 lb 3.2 oz)   SpO2 97%   BMI 26.79 kg/m²       No intake or output data in the 24 hours ending 19 1350     Physical Examination:             Constitutional:  No acute distress, cooperative, pleasant    ENT:  Oral mucous moist, oropharynx benign. Neck supple,    Resp:  CTA bilaterally. No wheezing/rhonchi/rales. No accessory muscle use   CV:  Regular rhythm, normal rate, no murmurs, gallops, rubs    GI:  Soft, non distended, non tender. normoactive bowel sounds, no hepatosplenomegaly     Musculoskeletal:  No edema, warm, 2+ pulses throughout    Neurologic:  Moves all extremities. AAOx3, CN II-XII reviewed     Skin:  Good turgor, no rashes or ulcers       Data Review:   Imaging and laboratory data reviewed. Labs:     Recent Labs     08/13/19  0256 08/12/19  1817  08/12/19  0939   WBC 9.7  --   --  9.6   HGB 7.8* 8.2*   < > 6.3*   HCT 24.8* 26.1*   < > 20.9*     --   --  158    < > = values in this interval not displayed. Recent Labs     08/13/19  0255 08/13/19  0254 08/12/19  0939 08/11/19  0547   NA  --  137 140 137   K  --  4.0 3.3* 3.8   CL  --  103 105 100   CO2  --  29 28 33*   BUN  --  11 11 9   CREA  --  4.64* 3.69* 4.36*   GLU  --  84 85 83   CA 8.4* 8.2* 7.9* 8.2*     No results for input(s): SGOT, GPT, ALT, AP, TBIL, TBILI, TP, ALB, GLOB, GGT, AML, LPSE in the last 72 hours. No lab exists for component: AMYP, HLPSE  No results for input(s): INR, PTP, APTT in the last 72 hours. No lab exists for component: INREXT   No results for input(s): FE, TIBC, PSAT, FERR in the last 72 hours. Lab Results   Component Value Date/Time    Folate 18.8 03/31/2017 03:46 PM      No results for input(s): PH, PCO2, PO2 in the last 72 hours. No results for input(s): CPK, CKNDX, TROIQ in the last 72 hours.     No lab exists for component: CPKMB  No results found for: CHOL, CHOLX, CHLST, CHOLV, HDL, LDL, LDLC, DLDLP, TGLX, TRIGL, TRIGP, CHHD, CHHDX  Lab Results   Component Value Date/Time    Glucose (POC) 112 (H) 04/01/2017 06:46 AM    Glucose (POC) 99 03/31/2017 04:39 PM    Glucose (POC) 83 08/16/2011 08:12 AM    Glucose  (A) 08/02/2012 03:45 PM     No results found for: COLOR, APPRN, SPGRU, REFSG, RIGO, PROTU, GLUCU, KETU, BILU, UROU, SABRINA, LEUKU, GLUKE, EPSU, BACTU, WBCU, RBCU, CASTS, UCRY      Medications Reviewed:     Current Facility-Administered Medications   Medication Dose Route Frequency    0.9% sodium chloride infusion 250 mL  250 mL IntraVENous PRN    0.9% sodium chloride infusion 250 mL  250 mL IntraVENous PRN    calcitRIOL (ROCALTROL) capsule 1.75 mcg  1.75 mcg Oral 3 times weekly    0.9% sodium chloride infusion 250 mL  250 mL IntraVENous PRN    sodium chloride (NS) flush 5-40 mL  5-40 mL IntraVENous Q8H    sodium chloride (NS) flush 5-40 mL  5-40 mL IntraVENous PRN    ondansetron (ZOFRAN) injection 4 mg  4 mg IntraVENous Q4H PRN    acetaminophen (TYLENOL) tablet 650 mg  650 mg Oral Q4H PRN    amLODIPine (NORVASC) tablet 10 mg  10 mg Oral DAILY    hydrALAZINE (APRESOLINE) tablet 25 mg  25 mg Oral BID    sevelamer carbonate (RENVELA) tab 800 mg  800 mg Oral TID WITH MEALS    ipratropium (ATROVENT) 0.02 % nebulizer solution 0.5 mg  0.5 mg Nebulization Q6H RT    albuterol (PROVENTIL VENTOLIN) nebulizer solution 2.5 mg  2.5 mg Nebulization Q4H PRN    sevelamer carbonate (RENVELA) tab 800 mg  800 mg Oral BID PRN    sucralfate (CARAFATE) tablet 1 g  1 g Oral AC&HS    pantoprazole (PROTONIX) tablet 40 mg  40 mg Oral ACB&D    epoetin lenny-epbx (RETACRIT) injection 8,000 Units  8,000 Units SubCUTAneous Q MON, WED & FRI     ______________________________________________________________________  EXPECTED LENGTH OF STAY: 3d 16h  ACTUAL LENGTH OF STAY:          4                 Susana Nicole MD

## 2019-08-14 VITALS
DIASTOLIC BLOOD PRESSURE: 85 MMHG | HEART RATE: 80 BPM | SYSTOLIC BLOOD PRESSURE: 131 MMHG | OXYGEN SATURATION: 98 % | TEMPERATURE: 97.4 F | RESPIRATION RATE: 16 BRPM | WEIGHT: 178 LBS | BODY MASS INDEX: 27.06 KG/M2

## 2019-08-14 PROBLEM — K92.2 GI BLEED: Status: ACTIVE | Noted: 2019-08-14

## 2019-08-14 LAB
ANION GAP SERPL CALC-SCNC: 5 MMOL/L (ref 5–15)
BASOPHILS # BLD: 0 K/UL (ref 0–0.1)
BASOPHILS # BLD: 0 K/UL (ref 0–0.1)
BASOPHILS NFR BLD: 0 % (ref 0–1)
BASOPHILS NFR BLD: 1 % (ref 0–1)
BUN SERPL-MCNC: 12 MG/DL (ref 6–20)
BUN/CREAT SERPL: 2 (ref 12–20)
CALCIUM SERPL-MCNC: 8.3 MG/DL (ref 8.5–10.1)
CHLORIDE SERPL-SCNC: 101 MMOL/L (ref 97–108)
CO2 SERPL-SCNC: 32 MMOL/L (ref 21–32)
CREAT SERPL-MCNC: 4.88 MG/DL (ref 0.7–1.3)
DIFFERENTIAL METHOD BLD: ABNORMAL
DIFFERENTIAL METHOD BLD: ABNORMAL
EOSINOPHIL # BLD: 1 K/UL (ref 0–0.4)
EOSINOPHIL # BLD: 1 K/UL (ref 0–0.4)
EOSINOPHIL NFR BLD: 11 % (ref 0–7)
EOSINOPHIL NFR BLD: 11 % (ref 0–7)
ERYTHROCYTE [DISTWIDTH] IN BLOOD BY AUTOMATED COUNT: 16.8 % (ref 11.5–14.5)
ERYTHROCYTE [DISTWIDTH] IN BLOOD BY AUTOMATED COUNT: 17 % (ref 11.5–14.5)
GLUCOSE SERPL-MCNC: 94 MG/DL (ref 65–100)
HCT VFR BLD AUTO: 22.9 % (ref 36.6–50.3)
HCT VFR BLD AUTO: 23.3 % (ref 36.6–50.3)
HGB BLD-MCNC: 7.2 G/DL (ref 12.1–17)
HGB BLD-MCNC: 7.3 G/DL (ref 12.1–17)
HGB BLD-MCNC: 7.3 G/DL (ref 12.1–17)
IMM GRANULOCYTES # BLD AUTO: 0 K/UL (ref 0–0.04)
IMM GRANULOCYTES # BLD AUTO: 0 K/UL (ref 0–0.04)
IMM GRANULOCYTES NFR BLD AUTO: 0 % (ref 0–0.5)
IMM GRANULOCYTES NFR BLD AUTO: 0 % (ref 0–0.5)
LYMPHOCYTES # BLD: 0.8 K/UL (ref 0.8–3.5)
LYMPHOCYTES # BLD: 0.9 K/UL (ref 0.8–3.5)
LYMPHOCYTES NFR BLD: 10 % (ref 12–49)
LYMPHOCYTES NFR BLD: 9 % (ref 12–49)
MAGNESIUM SERPL-MCNC: 2 MG/DL (ref 1.6–2.4)
MCH RBC QN AUTO: 28.6 PG (ref 26–34)
MCH RBC QN AUTO: 29.3 PG (ref 26–34)
MCHC RBC AUTO-ENTMCNC: 31.3 G/DL (ref 30–36.5)
MCHC RBC AUTO-ENTMCNC: 31.4 G/DL (ref 30–36.5)
MCV RBC AUTO: 91.4 FL (ref 80–99)
MCV RBC AUTO: 93.1 FL (ref 80–99)
MONOCYTES # BLD: 0.5 K/UL (ref 0–1)
MONOCYTES # BLD: 0.6 K/UL (ref 0–1)
MONOCYTES NFR BLD: 6 % (ref 5–13)
MONOCYTES NFR BLD: 7 % (ref 5–13)
NEUTS SEG # BLD: 6.4 K/UL (ref 1.8–8)
NEUTS SEG # BLD: 6.4 K/UL (ref 1.8–8)
NEUTS SEG NFR BLD: 71 % (ref 32–75)
NEUTS SEG NFR BLD: 74 % (ref 32–75)
NRBC # BLD: 0 K/UL (ref 0–0.01)
NRBC # BLD: 0 K/UL (ref 0–0.01)
NRBC BLD-RTO: 0 PER 100 WBC
NRBC BLD-RTO: 0 PER 100 WBC
PHOSPHATE SERPL-MCNC: 2.6 MG/DL (ref 2.6–4.7)
PLATELET # BLD AUTO: 143 K/UL (ref 150–400)
PLATELET # BLD AUTO: 150 K/UL (ref 150–400)
PMV BLD AUTO: 10.6 FL (ref 8.9–12.9)
PMV BLD AUTO: 10.7 FL (ref 8.9–12.9)
POTASSIUM SERPL-SCNC: 3.7 MMOL/L (ref 3.5–5.1)
RBC # BLD AUTO: 2.46 M/UL (ref 4.1–5.7)
RBC # BLD AUTO: 2.55 M/UL (ref 4.1–5.7)
RBC MORPH BLD: ABNORMAL
SODIUM SERPL-SCNC: 138 MMOL/L (ref 136–145)
WBC # BLD AUTO: 8.7 K/UL (ref 4.1–11.1)
WBC # BLD AUTO: 8.9 K/UL (ref 4.1–11.1)

## 2019-08-14 PROCEDURE — 85018 HEMOGLOBIN: CPT

## 2019-08-14 PROCEDURE — 74011250637 HC RX REV CODE- 250/637: Performed by: NURSE PRACTITIONER

## 2019-08-14 PROCEDURE — 85025 COMPLETE CBC W/AUTO DIFF WBC: CPT

## 2019-08-14 PROCEDURE — 94640 AIRWAY INHALATION TREATMENT: CPT

## 2019-08-14 PROCEDURE — 83735 ASSAY OF MAGNESIUM: CPT

## 2019-08-14 PROCEDURE — 74011250637 HC RX REV CODE- 250/637: Performed by: HOSPITALIST

## 2019-08-14 PROCEDURE — 80048 BASIC METABOLIC PNL TOTAL CA: CPT

## 2019-08-14 PROCEDURE — 74011000250 HC RX REV CODE- 250: Performed by: HOSPITALIST

## 2019-08-14 PROCEDURE — 0DJ07ZZ INSPECTION OF UPPER INTESTINAL TRACT, VIA NATURAL OR ARTIFICIAL OPENING: ICD-10-PCS | Performed by: INTERNAL MEDICINE

## 2019-08-14 PROCEDURE — 90935 HEMODIALYSIS ONE EVALUATION: CPT

## 2019-08-14 PROCEDURE — 36415 COLL VENOUS BLD VENIPUNCTURE: CPT

## 2019-08-14 PROCEDURE — 94760 N-INVAS EAR/PLS OXIMETRY 1: CPT

## 2019-08-14 PROCEDURE — 84100 ASSAY OF PHOSPHORUS: CPT

## 2019-08-14 PROCEDURE — 74011000250 HC RX REV CODE- 250

## 2019-08-14 RX ORDER — CALCITRIOL 0.25 UG/1
1.75 CAPSULE ORAL 3 TIMES WEEKLY
Qty: 84 CAP | Refills: 2 | Status: SHIPPED | OUTPATIENT
Start: 2019-08-14 | End: 2019-08-22

## 2019-08-14 RX ORDER — DEXTROSE 50 % IN WATER (D50W) INTRAVENOUS SYRINGE
25-50 AS NEEDED
Status: DISCONTINUED | OUTPATIENT
Start: 2019-08-14 | End: 2019-08-14 | Stop reason: RX

## 2019-08-14 RX ORDER — BACITRACIN 500 UNIT/G
PACKET (EA) TOPICAL
Status: COMPLETED
Start: 2019-08-14 | End: 2019-08-14

## 2019-08-14 RX ORDER — SUCRALFATE 1 G/1
1 TABLET ORAL
Qty: 120 TAB | Refills: 0 | Status: SHIPPED | OUTPATIENT
Start: 2019-08-14 | End: 2019-09-13

## 2019-08-14 RX ORDER — MAGNESIUM SULFATE 100 %
4 CRYSTALS MISCELLANEOUS AS NEEDED
Status: DISCONTINUED | OUTPATIENT
Start: 2019-08-14 | End: 2019-08-14 | Stop reason: HOSPADM

## 2019-08-14 RX ORDER — OMEPRAZOLE 20 MG/1
20 CAPSULE, DELAYED RELEASE ORAL 2 TIMES DAILY
Qty: 30 CAP | Refills: 4 | Status: SHIPPED | OUTPATIENT
Start: 2019-08-14 | End: 2019-08-22

## 2019-08-14 RX ADMIN — IPRATROPIUM BROMIDE 0.5 MG: 0.5 SOLUTION RESPIRATORY (INHALATION) at 02:08

## 2019-08-14 RX ADMIN — ACETAMINOPHEN 650 MG: 325 TABLET ORAL at 12:15

## 2019-08-14 RX ADMIN — PANTOPRAZOLE SODIUM 40 MG: 40 TABLET, DELAYED RELEASE ORAL at 07:07

## 2019-08-14 RX ADMIN — SUCRALFATE 1 G: 1 TABLET ORAL at 12:15

## 2019-08-14 RX ADMIN — BACITRACIN 1 PACKET: 500 OINTMENT TOPICAL at 15:32

## 2019-08-14 RX ADMIN — Medication 10 ML: at 07:07

## 2019-08-14 RX ADMIN — IPRATROPIUM BROMIDE 0.5 MG: 0.5 SOLUTION RESPIRATORY (INHALATION) at 14:46

## 2019-08-14 RX ADMIN — SUCRALFATE 1 G: 1 TABLET ORAL at 07:07

## 2019-08-14 RX ADMIN — SEVELAMER CARBONATE 800 MG: 800 TABLET, FILM COATED ORAL at 07:07

## 2019-08-14 NOTE — DISCHARGE SUMMARY
Discharge Summary       PATIENT ID: Quoc Sanon  MRN: [id number]   YOB: 1950    DATE OF ADMISSION: 8/9/2019 10:14 AM    DATE OF DISCHARGE: 08/14/2019   PRIMARY CARE PROVIDER: Nabila Granado MD     DISCHARGING PROVIDER: Jacqueline Garcia MD    To contact this individual call [phone] and ask the  to page. If unavailable ask to be transferred the Adult Hospitalist Department. CONSULTATIONS: IP CONSULT TO NEPHROLOGY  IP CONSULT TO GASTROENTEROLOGY  IP CONSULT TO HOSPITALIST    PROCEDURES/SURGERIES: Procedure(s):  CAPSULE Complete @ [address] [place] [address] COURSE:   GI bleed - stable hemoglobin, no source identified   ESRD   HTN     63y/o male with ESRD, Type 2 diabetes, and HTN, who presented directly from his dialysis center for low hemoglobin. Had laci LGIB, CTA stat was done which was negative for any active bleeding. GI performed capsule study, which was also negative. DISCHARGE DIAGNOSES / PLAN:      Acute on chronic blood loss anemia - s/p CTA (neg), sp capsule endscopy 8/13  - GI following  - s/p 1 U PRBC since admission  - Hold plavix   - PPI BID, carafate   - Continue EPO      ESRD on HD -   - Nephrology following, HD per schedule.      HTN - home meds      Type 2 diabetes - SSI, POCT glucose checks, hypoglycemia protocol      ADDITIONAL CARE RECOMMENDATIONS:  1. Please take all medications as prescribed. Note changes as below. - Hold your plavix. - Continue omeprazole but take this twice per day  - Add carafate. 2. Please make sure to follow up with your primary care physician within 1-2 weeks of discharge for hospital follow up. You also need to follow up with Dr. Sarah Braswell   3. Please continue to monitor for any signs of bleeding. If you feel lightheaded or have chest pain, shortness of breath please come back for evaluation  4. Avoid tobacco, alcohol and illicit drug use.          PENDING TEST RESULTS:   At the time of discharge the following test results are still pending: None    FOLLOW UP APPOINTMENTS:    Follow-up Information     Follow up With Specialties Details Why Akash Abrams MD [facility] In 1 week  [address] [place], [place] [id number]               DIET: Renal Diet    ACTIVITY: Activity as tolerated    WOUND CARE: None    EQUIPMENT needed: None      DISCHARGE MEDICATIONS:  Current Discharge Medication List      START taking these medications    Details   calcitRIOL (ROCALTROL) 0.25 mcg capsule Take 7 Caps by mouth Three (3) times a week for 30 days. Indications: hyperparathyroidism caused by chronic kidney failure  Qty: 84 Cap, Refills: 2      sucralfate (CARAFATE) 1 gram tablet Take 1 Tab by mouth Before breakfast, lunch, dinner and at bedtime for 30 days. Qty: 120 Tab, Refills: 0         CONTINUE these medications which have CHANGED    Details   omeprazole (PRILOSEC) 20 mg capsule Take 1 Cap by mouth two (2) times a day. Qty: 30 Cap, Refills: 4         CONTINUE these medications which have NOT CHANGED    Details   amLODIPine (NORVASC) 10 mg tablet TAKE ONE TABLET BY MOUTH DAILY  Qty: 30 Tab, Refills: 2      PROAIR HFA 90 mcg/actuation inhaler INHALE TWO PUFFS BY MOUTH EVERY 4 HOURS AS NEEDED FOR SHORTNESS OF BREATH  Qty: 1 Inhaler, Refills: 1      hydrALAZINE (APRESOLINE) 25 mg tablet TAKE ONE TABLET BY MOUTH TWICE A DAY  Qty: 60 Tab, Refills: 3      SPIRIVA WITH HANDIHALER 18 mcg inhalation capsule INHALE THE ENTIRE CONTENTS OF 1 CAPSULE ONCE A DAY USING HANDIHALER DEVICE  Qty: 30 Cap, Refills: 1      RENVELA 800 mg Tab tab TAKE 1 TABLET BY MOUTH FIVE TIMES DAILY WITH MEALS AND SNACKS  Qty: 450 Tab, Refills: 0      acetaminophen (TYLENOL) 325 mg tablet Take 2 Tabs by mouth every four (4) hours as needed.   Qty: 30 Tab, Refills: 1         STOP taking these medications       clopidogrel (PLAVIX) 75 mg tab Comments:   Reason for Stopping:                 NOTIFY [facility] FOLLOWING:   Fever over 101 degrees for 24 hours. Chest pain, shortness of breath, fever, chills, nausea, vomiting, diarrhea, change in mentation, falling, weakness, bleeding. Severe pain or pain not relieved by medications. Or, any other signs or symptoms that you may have questions about.     DISPOSITION:  X  Home With:   OT  PT  HH  RN       Long term SNF/Inpatient Rehab    Independent/assisted living    Hospice    Other:       PATIENT CONDITION AT DISCHARGE:     Functional status    Poor     Deconditioned    X Independent      Cognition    X Lucid     Forgetful     Dementia      Catheters/lines (plus indication)    Singh     PICC     PEG    X None      Code status    X Full code     DNR      PHYSICAL EXAMINATION AT DISCHARGE:  Visit Vitals  /85   Pulse 80   Temp 97.4 °F (36.3 °C)   Resp 16   Wt 80.7 kg (178 lb)   SpO2 98%   BMI 27.06 kg/m²     Gen: NAD, awake in bed  HEENT: NC/AT, sclera anicteric, PERRL, EOMI  CV: RRR no m/r/g, CVC c/d/i   Resp: CTA b/l no increased work of breathing  Abd: NT/ND, normal bowel sounds  Ext: 2+ pulses, no edema  Skin: No rashes        CHRONIC MEDICAL DIAGNOSES:  Problem List as of 8/14/2019 Date Reviewed: 5/21/2017          Codes Class Noted - Resolved    S/P transmetatarsal amputation of foot, right ([facility] ICD-10-CM: Z68.889  ICD-9-CM: V49.73  Unknown - Present        Peripheral vascular disease ([facility] ICD-10-CM: I73.9  ICD-9-CM: 443.9  7/26/2017 - Present        Heme positive stool ICD-10-CM: R19.5  ICD-9-CM: 792.1  4/1/2017 - Present    Overview Signed 4/1/2017  3:20 PM by Barrie Lopez MD     3/31/17             Other specified anemias ICD-10-CM: D64.89  ICD-9-CM: 285.8  3/31/2017 - Present        Severe anemia ICD-10-CM: D64.9  ICD-9-CM: 285.9  3/31/2017 - Present        Dialysis patient ([facility] ICD-10-CM: Z99.2  ICD-9-CM: V45.11  10/11/2016 - Present        Toe amputation status ([facility] ICD-10-CM: X78.590Q  ICD-9-CM: 895.0  8/23/2016 - Present        Type 2 diabetes, diet controlled ([facility] ICD-10-CM: E11.9  ICD-9-CM: 250.00  8/23/2016 - Present        ESRD on dialysis [facility]) ICD-10-CM: N18.6, Z99.2  ICD-9-CM: 585.6, V45.11  8/23/2016 - Present        Kidney disease ICD-10-CM: N28.9  ICD-9-CM: 593.9  7/21/2011 - Present        ESRD (end stage renal disease) ([facility] ICD-10-CM: N18.6  ICD-9-CM: 585.6  7/21/2011 - Present              Greater than 30 minutes were spent with the patient on counseling and coordination of care    Signed:   Caitlin Perez MD  8/14/2019  2:04 PM

## 2019-08-14 NOTE — PROGRESS NOTES
Sharmaine Bolivar [address]  [address] [facility], [address]       GI PROGRESS NOTE  Salvatore People, [facility] office  [phone] NP in-hospital cell phone M-F until 4:30  After 5pm or on weekends, please call  for physician on call      NAME: Alan Mckeon   :  1950   MRN:  [id number]       Subjective:    He had a non-bloody bowel movement last night. No abdominal pain. He is frustrated with dialysis and food choices. Objective:     VITALS:   Last 24hrs VS reviewed since prior progress note. Most recent are:  Visit Vitals  BP (P) 117/67   Pulse (P) 77   Temp 98 °F (36.7 °C)   Resp (P) 16   Wt 80.7 kg (178 lb)   SpO2 98%   BMI 27.06 kg/m²       PHYSICAL EXAM:  General: Cooperative, no acute distress    Neurologic:  Alert and oriented X 3. HEENT: EOMI, no scleral icterus   Lungs:  No increased WOB  Heart:  S1 S2   Abdomen: Soft, non-distended, no tenderness. +Bowel sounds  Extremities: warm  Psych:   Good insight. Not anxious or agitated. Lab Data Reviewed:     Recent Results (from the past 24 hour(s))   CBC WITH AUTOMATED DIFF    Collection Time: 19  8:02 AM   Result Value Ref Range    WBC 8.7 4.1 - 11.1 K/uL    RBC 2.55 (L) 4.10 - 5.70 M/uL    HGB 7.3 (L) 12.1 - 17.0 g/dL    HCT 23.3 (L) 36.6 - 50.3 %    MCV 91.4 80.0 - 99.0 FL    MCH 28.6 26.0 - 34.0 PG    MCHC 31.3 30.0 - 36.5 g/dL    RDW 16.8 (H) 11.5 - 14.5 %    PLATELET 825 575 - 297 K/uL    MPV 10.6 8.9 - 12.9 FL    NRBC 0.0 0  WBC    ABSOLUTE NRBC 0.00 0.00 - 0.01 K/uL    NEUTROPHILS PENDING %    LYMPHOCYTES PENDING %    MONOCYTES PENDING %    EOSINOPHILS PENDING %    BASOPHILS PENDING %    IMMATURE GRANULOCYTES PENDING %    ABS. NEUTROPHILS PENDING K/UL    ABS. LYMPHOCYTES PENDING K/UL    ABS. MONOCYTES PENDING K/UL    ABS. EOSINOPHILS PENDING K/UL    ABS. BASOPHILS PENDING K/UL    ABS. IMM. GRANS.  PENDING K/UL    DF PENDING    MAGNESIUM    Collection Time: 19  8:02 AM   Result Value Ref Range Magnesium 2.0 1.6 - 2.4 mg/dL   PHOSPHORUS    Collection Time: 08/14/19  8:02 AM   Result Value Ref Range    Phosphorus 2.6 2.6 - 4.7 MG/DL   METABOLIC PANEL, BASIC    Collection Time: 08/14/19  8:02 AM   Result Value Ref Range    Sodium 138 136 - 145 mmol/L    Potassium 3.7 3.5 - 5.1 mmol/L    Chloride 101 97 - 108 mmol/L    CO2 32 21 - 32 mmol/L    Anion gap 5 5 - 15 mmol/L    Glucose 94 65 - 100 mg/dL    BUN 12 6 - 20 MG/DL    Creatinine 4.88 (H) 0.70 - 1.30 MG/DL    BUN/Creatinine ratio 2 (L) 12 - 20      GFR est AA 14 (L) >60 ml/min/1.73m2    GFR est non-AA 12 (L) >60 ml/min/1.73m2    Calcium 8.3 (L) 8.5 - 10.1 MG/DL     IMPRESSION:  1. No active GI bleed. 2. End-stage renal cyst kidneys with left renal 1.5 cm possible neoplasm. 3. Ileus. 4. Extensive atherosclerosis. Assessment:   · Anemia: hgb 7.3, not iron deficient; midsmall bowel bleeding ulcer on M2A on 7/14/19; CT enterography negative; CT 8/12/19 negative for GIB  · ESRD: nephrology following, on HD and EPO  · On Plavix PTA     Patient Active Problem List   Diagnosis Code    Kidney disease N28.9    ESRD (end stage renal disease) ([address] N18.6    Toe amputation status ([address] W24.170Q    Type 2 diabetes, diet controlled ([address] E11.9    ESRD on dialysis ([address] N18.6, Z99.2    Dialysis patient ([address] Z99.2    Other specified anemias D64.89    Severe anemia D64.9    Heme positive stool R19.5    Peripheral vascular disease ([address] I73.9    S/P transmetatarsal amputation of foot, right ([address] Z89.431     Plan:   · Capsule study to be read today  · Hold Plavix as able  · Monitor CBC, transfuse as necessary  · BID PPI and carafate     Signed By: Anastacia Lizama NP     8/14/2019  10:14 AM       GI Attending: Please see capsule endoscopy report. Ky Handy MD

## 2019-08-14 NOTE — PROCEDURES
[facility] Dialysis Team [facility]  [phone]    Vitals   Pre   Post   Assessment   Pre   Post     Temp  Temp: 97.8 °F (36.6 °C) (08/14/19 0000)  97.8   LOC  A/O x 3 A/O x 3   HR   Pulse (Heart Rate): 88 (08/14/19 0000) 87 Lungs   Diminished  Diminished   B/P   BP: 115/68(treatment ended. Blood returned) (08/14/19 0000) 136/65 Cardiac   s1s2  s1s2   Resp   Resp Rate: 18 (08/13/19 2200) 18 Skin   Intact AV-Graft  Intact AVG   Pain level  Pain Intensity 1: 0 (08/13/19 1758) 0 Edema  None     None   Orders:    Duration:   Start:    2200 End:    0000 Total:   2 hours   Dialyzer:   Dialyzer/Set Up Inspection: Revaclear (08/13/19 2200)   K Bath:   Dialysate K (mEq/L): 2 (08/13/19 2200)   Ca Bath:   Dialysate CA (mEq/L): 3.0 (08/13/19 2200)   Na/Bicarb:   Dialysate NA (mEq/L): 140 (08/13/19 2200)   Target Fluid Removal:   Goal/Amount of Fluid to Remove (mL): 1000 mL (08/14/19 0000)   Access     Type & Location:   Right loer AV-Graft. + B/T. Prepped per protocol.  Cannulated x 2 # 15 gauge, 1\" needles without difficulty   Labs     Obtained/Reviewed   Critical Results Called   Date when labs were drawn-  Hgb-    HGB   Date Value Ref Range Status   08/13/2019 7.8 (L) 12.1 - 17.0 g/dL Final     K-    Potassium   Date Value Ref Range Status   08/13/2019 4.0 3.5 - 5.1 mmol/L Final     Ca-   Calcium   Date Value Ref Range Status   08/13/2019 8.4 (L) 8.5 - 10.1 MG/DL Final     Bun-   BUN   Date Value Ref Range Status   08/13/2019 11 6 - 20 MG/DL Final     Creat-   Creatinine   Date Value Ref Range Status   08/13/2019 4.64 (H) 0.70 - 1.30 MG/DL Final     Comment:     INVESTIGATED PER DELTA CHECK PROTOCOL        Medications/ Blood Products Given     Name   Dose   Route and Time     None ordered                Blood Volume Processed (BVP):    43.1 Net Fluid   Removed:  1500-816=8275 ml   Comments   Time Out Done: 2130  Primary Nurse Rpt Pre:Zuri Garcia, RN  Primary Nurse Rpt Tonja Landry RN  Pt Education:Procedure  Care Plan:Continue HD as ordered by Nephrologist  Tx Summary:Treatment initiated via right AVG. Tolerated treatment well. A;; possible blood returned. Fistula needles removed. Clean dressing applied. Endorsed to Lindy Gowers, RN  Admiting [facility] ESR[facility]  Pt's previous clinic-  Consent signed - Informed Consent Verified: Yes (08/13/19 2200)  Moniqueita Consent - Yes  Hepatitis Status- Immune 60 on 7/10/19  Machine #- Machine Number: [id number] (08/13/19 2200)  Telemetry status-  Pre-dialysis wt. - UNable to obtain.  Bed not calibrated

## 2019-08-14 NOTE — DIALYSIS
TRANSFER - IN REPORT:    Verbal report received from Melissa on Shayan Cancel  being received from [phone] for dialysis     Report consisted of patients Situation, Background, Assessment and   Recommendations(SBAR). Information from the following report(s) SBAR  was reviewed with the receiving nurse. Opportunity for questions and clarification was provided. Assessment completed upon patients arrival to unit and care assumed.

## 2019-08-14 NOTE — PROCEDURES
D[facility]pti Dialysis Team [facility]  [phone]    Vitals   Pre   Post   Assessment   Pre   Post     Temp  Temp: 98 °F (36.7 °C) (08/14/19 0837)  98.3 LOC  AOX3 Same   HR   Pulse (Heart Rate): 93 (08/14/19 0837) 82 Lungs   Clear to diminished Same   B/P   BP: 147/80 (08/14/19 0837) 106/70 Cardiac   Regular Same   Resp   Resp Rate: 16 (08/14/19 0837) 16 Skin   Amputated Bilat toes Same   Pain level  Pain Intensity 1: 0 (08/14/19 0235) 5 Edema  None Same   Orders:    Duration:   Start:    0840 End:    1140 Total:   3 hrs   Dialyzer:   Dialyzer/Set Up Inspection: Gelacio Ochoa (08/14/19 0837)   K Bath:   Dialysate K (mEq/L): 2 (08/14/19 0837)   Ca Bath:   Dialysate CA (mEq/L): 2.5 (08/14/19 0837)   Na/Bicarb:   Dialysate NA (mEq/L): 140 (08/14/19 0837)   Target Fluid Removal:   Goal/Amount of Fluid to Remove (mL): 1000 mL (08/14/19 0000)   Access     Type & Location:   RLA Graft cleansed with alcohol. B+T present. Cannulated with a 15 g needle. No S/S of infection.    Labs     Obtained/Reviewed   Critical Results Called   Date when labs were drawn-  Hgb-    HGB   Date Value Ref Range Status   08/13/2019 7.8 (L) 12.1 - 17.0 g/dL Final     K-    Potassium   Date Value Ref Range Status   08/13/2019 4.0 3.5 - 5.1 mmol/L Final     Ca-   Calcium   Date Value Ref Range Status   08/13/2019 8.4 (L) 8.5 - 10.1 MG/DL Final     Bun-   BUN   Date Value Ref Range Status   08/13/2019 11 6 - 20 MG/DL Final     Creat-   Creatinine   Date Value Ref Range Status   08/13/2019 4.64 (H) 0.70 - 1.30 MG/DL Final     Comment:     INVESTIGATED PER DELTA CHECK PROTOCOL        Medications/ Blood Products Given     Name   Dose   Route and Time     None                Blood Volume Processed (BVP):    67.3L Net Fluid   Removed:  1459 ml   Comments   Time Out Done: Yes at 0800  Primary Nurse Rpt Pre: Melissa  Primary Nurse Rpt Post: Melissa  Pt Education: Access car e and tx modalities  Care Plan:Continuous  Tx Summary:  7944 Arrived Pt's room. Machine setup and tested. Report received from primary nurse. Assessment  Completed. RLA AV Graft cleansed with alcohol. B+T present. Cannulated with a 15 g needle. No S/S of infection. 0840 Tx started with pre VSS.   0900 Pt resting. Vascular access visible. Line connection intact. 8396 Vascular access visible. Line connection intact. 0930 Vascular access visible. Line connection intact. 0945 Vascular access visible. Line connection intact. Pt complained of pain. Advised PN to administer med. 1000 Vascular access visible. Line connection intact. 1015 BP low. UF off. Vascular access visible. Line connection intact. 1030 Vascular access visible. Line connection intact. UF turned back on.  1100 Vascular access visible. Line connection intact. [address]  Dr Nino Chino at bedside. Questions about dialysis orders for yesterday. Vascular access visible. Line connection intact. 1130 BP low. UF turned off. Vascular access visible. Line connection intact. 1142 BP still low. UF remained off. Pt stable. 1151 Tx ended with all possible blood returned from circuit. Hemostasis achieved within 10 mins. Report given to primary nurse and pt left in room with bed at its lowest position and CB within reach. Admiting Diagnosis: ESRD  Pt's previous clinic- [facility]  Consent signed - Informed Consent Verified: Yes (08/14/19 6666)  Deepti Consent - Yes on file  Hepatitis Status- Ag Neg 08/05/19 Ab Immune 08/05/19  Machine #- Machine Number: [id number] (08/14/19 7192)  Telemetry status- No tele  Pre-dialysis wt. - Pre-Dialysis Weight: 79.9 kg (176 lb 2.4 oz) (08/12/19 0876)

## 2019-08-14 NOTE — PROGRESS NOTES
CM noted discharge orders. Patient will discharge to home with family. Patients brother in law will transport at discharge.      CM will monitor  MADDIE Guerrero/BERONICA

## 2019-08-14 NOTE — PROGRESS NOTES
Name: Natacha Cleveland MRN: [id number]   : 1950 Hospital: [address]   Date: 2019        IMPRESSION:   · ESRD on HD. Patient is tolerating the HD well today. · Critical anemia from a GI bleed (small bowel)  · Secondary hyperparathyroidism from ESRD. PLAN:   · Complete a full HD treatment today. Next HD- on Friday. · GI bleed w/u per GI   · Hold Renvela for now   · Dose all meds to ESRD on HD      Subjective/Interval History:   I have reviewed the flowsheet and previous days notes. Underwent HD for two hours yesterday given volume overload. 1 L of fluid removed     ROS: Seen on HD. Frustrated because he received an extra HD treatment yesterday. Objective:   Vital Signs:    Visit Vitals  BP 91/49   Pulse 89   Temp 98 °F (36.7 °C)   Resp 16   Wt 80.7 kg (178 lb)   SpO2 98%   BMI 27.06 kg/m²       O2 Device: Room air       Temp (24hrs), Av.1 °F (36.7 °C), Min:97.8 °F (36.6 °C), Max:98.4 °F (36.9 °C)       Intake/Output:   Last shift:      No intake/output data recorded. Last 3 shifts:  1901 -  0700  In: -   Out: 1000     Intake/Output Summary (Last 24 hours) at 2019 1135  Last data filed at 2019 0000  Gross per 24 hour   Intake --   Output 1000 ml   Net -1000 ml        Physical Exam:  General:    Alert, cooperative,HD in progress, no distress, appears stated age. Head:   Normocephalic, without obvious abnormality, atraumatic. Eyes:   Conjunctivae/corneas clear. Lungs:   +crackle at basis   Chest wall:  No tenderness or deformity. No Accessory muscle use. Heart:   Regular rate and rhythm,  no murmur, rub or gallop. Abdomen:   Soft, non-tender. Not distended. Bowel sounds normal.  Extremities: Extremities normal, atraumatic, No cyanosis. No edema. No clubbing  Neurologic: Normal strength, Alert and oriented X 3.        DATA:  Labs:  Recent Labs     19  0802 19  0255 19  0254 19  0939     --  137 140   K 3.7  --  4.0 3.3*   --  103 105   CO2 32  --  29 28   BUN 12  --  11 11   CREA 4.88*  --  4.64* 3.69*   CA 8.3* 8.4* 8.2* 7.9*   PHOS 2.6  --   --   --    MG 2.0  --   --   --      Recent Labs     08/14/19  0802 08/13/19  0256 08/12/19  1817  08/12/19  0939   WBC 8.7 9.7  --   --  9.6   HGB 7.3* 7.8* 8.2*   < > 6.3*   HCT 23.3* 24.8* 26.1*   < > 20.9*    169  --   --  158    < > = values in this interval not displayed. No results for input(s): SKYLER, KU, CLU, CREAU in the last 72 hours.     No lab exists for component: PROU    Total time spent with patient:  35 minutes    [] Critical Care Provided    Care Plan discussed with:   Deng Barrios MD

## 2019-08-14 NOTE — DISCHARGE INSTRUCTIONS
Discharge Instructions       PATIENT ID: Emily Scott  MRN: [id number]   YOB: 1950    DATE OF ADMISSION: 8/9/2019 10:14 AM    DATE OF DISCHARGE: 8/14/2019    PRIMARY CARE PROVIDER: Sunil Portillo MD     ATTENDING PHYSICIAN: Mannie Thurman*  DISCHARGING PROVIDER: Volodymyr Rodriguez MD    To contact this individual call [phone] and ask the  to page. If unavailable ask to be transferred the Adult Hospitalist Department. DISCHARGE DIAGNOSES  GI bleed - stable hemoglobin, no source identified   ESRD   HTN     CONSULTATIONS: IP CONSULT TO NEPHROLOGY  IP CONSULT TO GASTROENTEROLOGY  IP CONSULT TO HOSPITALIST    PROCEDURES/SURGERIES: Procedure(s):  CAPSULE Complete @ 1515    PENDING TEST RESULTS:   At the time of discharge the following test results are still pending: None    FOLLOW UP APPOINTMENTS:   Follow-up Information     Follow up With Specialties Details Why Monico Donnelly MD Family Practice In 1 week  [address] [place] [address] [phone]             ADDITIONAL CARE RECOMMENDATIONS:   1. Please take all medications as prescribed. Note changes as below. - Hold your plavix. - Continue omeprazole but take this twice per day  - Add carafate. 2. Please make sure to follow up with your primary care physician within 1-2 weeks of discharge for hospital follow up. You also need to follow up with Dr. Nicole Closs   3. Please continue to monitor for any signs of bleeding. If you feel lightheaded or have chest pain, shortness of breath please come back for evaluation  4. Avoid tobacco, alcohol and illicit drug use. DIET: Renal Diet    ACTIVITY: Activity as tolerated    WOUND CARE: None    EQUIPMENT needed: None    DISCHARGE MEDICATIONS:   See Medication Reconciliation Form    · It is important that you take the medication exactly as they are prescribed.    · Keep your medication in the bottles provided by the pharmacist and keep a list of the medication names, dosages, and times to be taken in your wallet. · Do not take other medications without consulting your doctor. NOTIFY YOUR PHYSICIAN FOR ANY OF THE FOLLOWING:   Fever over 101 degrees for 24 hours. Chest pain, shortness of breath, fever, chills, nausea, vomiting, diarrhea, change in mentation, falling, weakness, bleeding. Severe pain or pain not relieved by medications. Or, any other signs or symptoms that you may have questions about. DISPOSITION:   X Home With:   OT  PT  HH  RN       SNF/Inpatient Rehab/LTAC    Independent/assisted living    Hospice    Other:     CDMP Checked: Yes X     PROBLEM LIST Updated:   Yes X       Signed:   Sybil King MD  8/14/2019  2:18 PM

## 2019-08-14 NOTE — PROGRESS NOTES
I spoke with hospitalist yesterday. Pt was apparently a pt of Dr. Gordon Kurtz (who is away this week). However, pt has not seen him in over 2 years; so I suspect he may be seeing another PCP. In any event, I will allow hospitalist [facility] to continue to follow pt, and I will let them know. Thanks.

## 2019-08-14 NOTE — PROGRESS NOTES
Spiritual Care Assessment/Progress Note  [facility]      NAME: Gisella Danielle      MRN: [id number]  AGE: 70 y.o.  SEX: male  B[employer] Affiliation: Pentecostal   Language: English     8/14/2019     Total Time (in minutes): 10     Spiritual Assessment begun in [facility] through conversation with:         []Patient        [] Family    [] Friend(s)        Reason for Consult: Initial/Spiritual assessment, patient floor, Initial visit     Spiritual beliefs: (Please include comment if needed)     [] Identifies with a bradly tradition:         [] Supported by a bradly community:            [] Claims no spiritual orientation:           [] Seeking spiritual identity:                [] Adheres to an individual form of spirituality:           [x] Not able to assess:                           Identified resources for coping:      [] Prayer                               [] Music                  [] Guided Imagery     [] Family/friends                 [] Pet visits     [] Devotional reading                         [x] Unknown     [] Other:                                              Interventions offered during this visit: (See comments for more details)    Patient Interventions: Initial/Spiritual assessment, patient floor, Initial visit           Plan of Care:     [x] Support spiritual and/or cultural needs    [] Support AMD and/or advance care planning process      [] Support grieving process   [] Coordinate Rites and/or Rituals    [] Coordination with community clergy   [] No spiritual needs identified at this time   [] Detailed Plan of Care below (See Comments)  [] Make referral to Music Therapy  [] Make referral to Pet Therapy     [] Make referral to Addiction services  [] Make referral to [facility]  [] Make referral to Spiritual Care Partner  [] No future visits requested        [x] Follow up visits as needed     Comments:  attempted visit to patients room on Oncology, but patient was off the floor in oncology. Spiritual Care will follow up as needed.     April Ortiz MDiv  Pager: [phone]-PAGE

## 2019-08-15 NOTE — PROCEDURES
[address]  [phone]        M2A Capsule Endoscopy Procedure    NAME:  Natacha Cleveland   :   1950   MRN:   [id number]       Date/Time:  2019   Procedure Type: M2A Capsule  Indications:   Iron deficiency anemia     Pre-operative Diagnosis: See indication above    Post-operative Diagnosis:  See findings below    : Jfefrey Levin. Shayna Parkinson MD    Referring Provider: Pam Calle MD    Anethesia/Sedation: none      Procedure Details   PLEASE REFER TO THE SCANNED REPORT FOR MORE DETAILS    Findings: The capsule did not enter the duodenum for approximately 5 hours. The preparation was not adequate. Impression:            As above    Recommendations:  1. Monitor CBC  2. Repeat capsule endoscopy as an outpatient if patient has recurrent anemia. Follow up with Dr. Sheila Moon. I would consider EGD with endoscopic deployment of the capsule into the duodenum      Ky Parkinson MD  2019  9:48 PM

## 2019-08-22 ENCOUNTER — APPOINTMENT (OUTPATIENT)
Dept: GENERAL RADIOLOGY | Age: 69
DRG: 811 | End: 2019-08-22
Attending: FAMILY MEDICINE
Payer: MEDICARE

## 2019-08-22 ENCOUNTER — HOSPITAL ENCOUNTER (INPATIENT)
Age: 69
LOS: 6 days | Discharge: LEFT BEFORE DISCHARGE | DRG: 811 | End: 2019-08-28
Attending: EMERGENCY MEDICINE | Admitting: FAMILY MEDICINE
Payer: MEDICARE

## 2019-08-22 DIAGNOSIS — D64.9 ANEMIA, UNSPECIFIED TYPE: Primary | ICD-10-CM

## 2019-08-22 LAB
ALBUMIN SERPL-MCNC: 2.6 G/DL (ref 3.5–5)
ALBUMIN/GLOB SERPL: 0.6 {RATIO} (ref 1.1–2.2)
ALP SERPL-CCNC: 150 U/L (ref 45–117)
ALT SERPL-CCNC: 20 U/L (ref 12–78)
ANION GAP SERPL CALC-SCNC: 6 MMOL/L (ref 5–15)
AST SERPL-CCNC: 37 U/L (ref 15–37)
BASOPHILS # BLD: 0 K/UL (ref 0–0.1)
BASOPHILS NFR BLD: 0 % (ref 0–1)
BILIRUB SERPL-MCNC: 0.2 MG/DL (ref 0.2–1)
BUN SERPL-MCNC: 18 MG/DL (ref 6–20)
BUN/CREAT SERPL: 3 (ref 12–20)
CALCIUM SERPL-MCNC: 8.9 MG/DL (ref 8.5–10.1)
CHLORIDE SERPL-SCNC: 98 MMOL/L (ref 97–108)
CO2 SERPL-SCNC: 32 MMOL/L (ref 21–32)
COMMENT, HOLDF: NORMAL
CREAT SERPL-MCNC: 6.77 MG/DL (ref 0.7–1.3)
DIFFERENTIAL METHOD BLD: ABNORMAL
EOSINOPHIL # BLD: 1.3 K/UL (ref 0–0.4)
EOSINOPHIL NFR BLD: 11 % (ref 0–7)
ERYTHROCYTE [DISTWIDTH] IN BLOOD BY AUTOMATED COUNT: 18.4 % (ref 11.5–14.5)
GLOBULIN SER CALC-MCNC: 4.4 G/DL (ref 2–4)
GLUCOSE SERPL-MCNC: 113 MG/DL (ref 65–100)
HCT VFR BLD AUTO: 20.3 % (ref 36.6–50.3)
HEMOCCULT STL QL: POSITIVE
HGB BLD-MCNC: 6 G/DL (ref 12.1–17)
IMM GRANULOCYTES # BLD AUTO: 0 K/UL (ref 0–0.04)
IMM GRANULOCYTES NFR BLD AUTO: 0 % (ref 0–0.5)
LYMPHOCYTES # BLD: 1.1 K/UL (ref 0.8–3.5)
LYMPHOCYTES NFR BLD: 10 % (ref 12–49)
MCH RBC QN AUTO: 27.3 PG (ref 26–34)
MCHC RBC AUTO-ENTMCNC: 29.6 G/DL (ref 30–36.5)
MCV RBC AUTO: 92.3 FL (ref 80–99)
MONOCYTES # BLD: 0.8 K/UL (ref 0–1)
MONOCYTES NFR BLD: 7 % (ref 5–13)
NEUTS SEG # BLD: 8.2 K/UL (ref 1.8–8)
NEUTS SEG NFR BLD: 72 % (ref 32–75)
NRBC # BLD: 0 K/UL (ref 0–0.01)
NRBC BLD-RTO: 0 PER 100 WBC
PLATELET # BLD AUTO: 284 K/UL (ref 150–400)
PMV BLD AUTO: 11.2 FL (ref 8.9–12.9)
POTASSIUM SERPL-SCNC: 3.8 MMOL/L (ref 3.5–5.1)
PROT SERPL-MCNC: 7 G/DL (ref 6.4–8.2)
RBC # BLD AUTO: 2.2 M/UL (ref 4.1–5.7)
RBC MORPH BLD: ABNORMAL
SAMPLES BEING HELD,HOLD: NORMAL
SODIUM SERPL-SCNC: 136 MMOL/L (ref 136–145)
WBC # BLD AUTO: 11.4 K/UL (ref 4.1–11.1)

## 2019-08-22 PROCEDURE — 80053 COMPREHEN METABOLIC PANEL: CPT

## 2019-08-22 PROCEDURE — 82272 OCCULT BLD FECES 1-3 TESTS: CPT

## 2019-08-22 PROCEDURE — 93005 ELECTROCARDIOGRAM TRACING: CPT

## 2019-08-22 PROCEDURE — 86923 COMPATIBILITY TEST ELECTRIC: CPT

## 2019-08-22 PROCEDURE — P9016 RBC LEUKOCYTES REDUCED: HCPCS

## 2019-08-22 PROCEDURE — 71046 X-RAY EXAM CHEST 2 VIEWS: CPT

## 2019-08-22 PROCEDURE — 36415 COLL VENOUS BLD VENIPUNCTURE: CPT

## 2019-08-22 PROCEDURE — 86900 BLOOD TYPING SEROLOGIC ABO: CPT

## 2019-08-22 PROCEDURE — 65660000000 HC RM CCU STEPDOWN

## 2019-08-22 PROCEDURE — 85025 COMPLETE CBC W/AUTO DIFF WBC: CPT

## 2019-08-22 PROCEDURE — 99282 EMERGENCY DEPT VISIT SF MDM: CPT

## 2019-08-22 RX ORDER — SODIUM CHLORIDE 0.9 % (FLUSH) 0.9 %
5-40 SYRINGE (ML) INJECTION EVERY 8 HOURS
Status: DISCONTINUED | OUTPATIENT
Start: 2019-08-23 | End: 2019-08-28 | Stop reason: HOSPADM

## 2019-08-22 RX ORDER — METOPROLOL TARTRATE 25 MG/1
25 TABLET, FILM COATED ORAL 2 TIMES DAILY
COMMUNITY

## 2019-08-22 RX ORDER — ASPIRIN 81 MG/1
81 TABLET ORAL DAILY
COMMUNITY

## 2019-08-22 RX ORDER — SODIUM CHLORIDE 0.9 % (FLUSH) 0.9 %
5-40 SYRINGE (ML) INJECTION AS NEEDED
Status: DISCONTINUED | OUTPATIENT
Start: 2019-08-22 | End: 2019-08-28 | Stop reason: HOSPADM

## 2019-08-22 RX ORDER — LATANOPROST 50 UG/ML
1 SOLUTION/ DROPS OPHTHALMIC
COMMUNITY

## 2019-08-22 RX ORDER — CLOPIDOGREL BISULFATE 75 MG/1
75 TABLET ORAL DAILY
COMMUNITY
End: 2019-08-22

## 2019-08-22 RX ORDER — SODIUM CHLORIDE 9 MG/ML
250 INJECTION, SOLUTION INTRAVENOUS AS NEEDED
Status: DISCONTINUED | OUTPATIENT
Start: 2019-08-22 | End: 2019-08-23 | Stop reason: SDUPTHER

## 2019-08-22 RX ORDER — PANTOPRAZOLE SODIUM 40 MG/1
40 TABLET, DELAYED RELEASE ORAL 2 TIMES DAILY
COMMUNITY

## 2019-08-22 RX ORDER — ATORVASTATIN CALCIUM 20 MG/1
20 TABLET, FILM COATED ORAL DAILY
COMMUNITY
End: 2019-08-22

## 2019-08-22 NOTE — PROGRESS NOTES
Reason for Readmission:     Patient referred to ED for blood transfusion and HD. Patient uses a walker for assistance. Patient confirmed PCP Dr. Rohan Driscoll and uses [facility] in [place]. Patient receives dialysis at [facility] on [address] ([phone])         RRAT Score and Risk Level: RRAT score was 21      Level of Readmission:   Medium to High but at this time doesn't appear to be a high risk for readmissions. Care Conference scheduled:   TBD but at this time cm doesn't see a need for a care conference at this time. Resources/supports as identified by patient/family:   Patient expressed no concerns with resources or support. Patient lives with his daughter. Top Challenges facing patient (as identified by patient/family and CM): Finances/Medication cost?  Patient expressed no concerns with cost of medications or financial issues. Transportation   Patient provides his own transportation to MD appointments, Dialysis at [facility] ([phone]) and running errand. No concerns with transportation. Support system or lack thereof? Patient has a strong support system and his daughter lives with him. Living arrangements? Lives with daughter in his apartment          Self-care/ADLs/Cognition? Independent w/ adls and iadls       Current Advanced Directive/Advance Care Plan:  Not on file and doesn't have one to provide. Plan for utilizing home health:   No plan for home health at this time. Transition of Care Plan:    Based on readmission, the patient's previous Plan of Care   has been evaluated and/or modified. The current Transition of Care Plan is:         1) Cm will monitor patient for disposition but patient will likely be discharged home with family. Patient stated he has transportation home.

## 2019-08-22 NOTE — PROGRESS NOTES
Admission Medication Reconciliation:    Information obtained from:  Daughter Angelica Carrel) @ [phone]  RxQuery data available¹:  YES    Comments/Recommendations: Updated PTA meds/reviewed patient's allergies. Patient states that daughter was best to ask about his medications. Notes:  HD: M-W-F, last received yesterday  Nicotine: smokes a pack of cigarettes daily, does not want a patch to replace  Advair: \"maybe uses sometimes\", has not had filled since March, daughter wasn't terribly certain so did not add to list, states that he uses the [employer] faithfully. Medication changes (since last review): Added  Anoro Ellipta  Pantoprazole  Metoprolol tartrate  Eye drops  MVT    Removed  APAP  Amlodopine  Calcitriol  Hydralazine  Renvela  Spiriva    Thank you for allowing me to participate in the care of your patient. Turner Duong PharmD, RN #[id number] benefit data reflects medications filled and processed through the patient's insurance, however   this data does NOT capture whether the medication was picked up or is currently being taken by the patient. Allergies:  Patient has no known allergies.     Significant PMH/Disease States:   Past Medical History:   Diagnosis Date    Arthritis     GOUT    Cancer ([facility] 2015    PROSTATE    Chronic kidney disease     KIDNEY FAILURE    Dialysis patient ([facility] 10/11/2016    ESRD on dialysis ([facility] 8/23/2016    Heme positive stool 4/1/2017    3/31/17    HTN (hypertension) 7/21/2011    Kidney disease 7/21/2011    Peripheral vascular disease ([facility] 7/26/2017    S/P transmetatarsal amputation of foot, right ([address]     Toe amputation status ([facility] 8/23/2016    Type II or unspecified type diabetes mellitus without mention of complication, not stated as uncontrolled     PATIENT DENIES     Chief Complaint for this Admission:    Chief Complaint   Patient presents with    Abnormal Lab Results     Prior to Admission Medications:   Prior to Admission Medications Prescriptions Last Dose Informant Patient Reported? Taking? PROAIR HFA 90 mcg/actuation inhaler 8/22/2019 at Unknown time  No Yes   Sig: INHALE TWO PUFFS BY MOUTH EVERY 4 HOURS AS NEEDED FOR SHORTNESS OF BREATH   aspirin delayed-release 81 mg tablet 8/22/2019 at Unknown time  Yes Yes   Sig: Take 81 mg by mouth daily. b complex-vitamin c-folic acid 0.8 mg (NEPHRO-JM) 0.8 mg tab tablet 8/22/2019 at Unknown time  Yes Yes   Sig: Take 1 Tab by mouth daily. latanoprost (XALATAN) 0.005 % ophthalmic solution 8/21/2019 at Unknown time  Yes Yes   Sig: Administer 1 Drop to both eyes nightly. metoprolol tartrate (LOPRESSOR) 25 mg tablet 8/22/2019 at Unknown time  Yes Yes   Sig: Take 25 mg by mouth two (2) times a day. pantoprazole (PROTONIX) 40 mg tablet 8/22/2019 at Unknown time  Yes Yes   Sig: Take 40 mg by mouth two (2) times a day. sucralfate (CARAFATE) 1 gram tablet 8/22/2019 at Unknown time  No Yes   Sig: Take 1 Tab by mouth Before breakfast, lunch, dinner and at bedtime for 30 days. umeclidinium-vilanterol (ANORO ELLIPTA) 62.5-25 mcg/actuation inhaler 8/21/2019 at Unknown time  Yes Yes   Sig: Take 1 Puff by inhalation daily.       Facility-Administered Medications: None

## 2019-08-22 NOTE — ED PROVIDER NOTES
70 y.o. male with past medical history significant for HTN, kidney disease, arthritis, type II DM, CKD, prostate cancer, toe amputation, ESRD, dialysis, heme positive, and peripheral vascular disease who presents from home via personal vehicle with chief complaint of abnormal lab results. Pt presents in the ED after being referred to the ED for a blood transfusion by his nephrologist's office. Pt states that today he was seen by his PCP and told that he was fine. Pt denies weakness, SOB, vomiting, fever, and chills. There are no other acute medical concerns at this time. Social hx: Current everyday smoker. PCP: Marquis William MD    Note written by marek Perry, as dictated by Gilbert Lim MD 5:15 PM      The history is provided by the patient. No  was used.         Past Medical History:   Diagnosis Date    Arthritis     GOUT    Cancer ([facility] 2015    PROSTATE    Chronic kidney disease     KIDNEY FAILURE    Dialysis patient ([facility] 10/11/2016    ESRD on dialysis ([facility] [facility] 8/23/2016    Heme positive stool 4/1/2017    3/31/17    HTN (hypertension) 7/21/2011    Kidney disease 7/21/2011    Peripheral vascular disease ([facility] 7/26/2017    S/P transmetatarsal amputation of foot, right (HCC)     Toe amputation status ([facility] 8/23/2016    Type II or unspecified type diabetes mellitus without mention of complication, not stated as uncontrolled     PATIENT DENIES       Past Surgical History:   Procedure Laterality Date    HX PROSTATECTOMY  OCT 2015    BIOPSY ONLY    VASCULAR SURGERY PROCEDURE UNLIST  6/21/11    UPPER R CHEST DIALYSIS ACCESS    VASCULAR SURGERY PROCEDURE UNLIST  2011    SHUNT RIGHT FOREARM FOR DIALYSIS CHEST ACCESS REMOVED         Family History:   Problem Relation Age of Onset    Cancer Mother         LUNG AND COLON    Kidney Disease Mother     Hypertension Father     Hypertension Sister     Diabetes Brother     Kidney Disease Brother     Diabetes Sister  Diabetes Sister     Anesth Problems Neg Hx        Social History     Socioeconomic History    Marital status: SINGLE     Spouse name: Not on file    Number of children: Not on file    Years of education: Not on file    Highest education level: Not on file   Occupational History    Not on file   Social Needs    Financial resource strain: Not on file    Food insecurity:     Worry: Not on file     Inability: Not on file    Transportation needs:     Medical: Not on file     Non-medical: Not on file   Tobacco Use    Smoking status: Current Every Day Smoker     Packs/day: 2.00     Years: 55.00     Pack years: 110.00    Smokeless tobacco: Never Used   Substance and Sexual Activity    Alcohol use: No     Comment: STOPPED DRINKING IN [facility] HIS OWN    Drug use: No    Sexual activity: Not on file   Lifestyle    Physical activity:     Days per week: Not on file     Minutes per session: Not on file    Stress: Not on file   Relationships    Social connections:     Talks on phone: Not on file     Gets together: Not on file     Attends Quaker service: Not on file     Active member of club or organization: Not on file     Attends meetings of clubs or organizations: Not on file     Relationship status: Not on file    Intimate partner violence:     Fear of current or ex partner: Not on file     Emotionally abused: Not on file     Physically abused: Not on file     Forced sexual activity: Not on file   Other Topics Concern    Not on file   Social History Narrative    Not on file         ALLERGIES: Patient has no known allergies. Review of Systems   Constitutional: Negative for activity change, appetite change, chills, fatigue and fever. HENT: Negative for ear pain, facial swelling, sore throat and trouble swallowing. Eyes: Negative for pain, discharge and visual disturbance. Respiratory: Negative for chest tightness, shortness of breath and wheezing.     Cardiovascular: Negative for chest pain and palpitations. Gastrointestinal: Negative for abdominal pain, blood in stool, nausea and vomiting. Genitourinary: Negative for difficulty urinating, flank pain and hematuria. Musculoskeletal: Negative for arthralgias, joint swelling, myalgias and neck pain. Skin: Negative for color change and rash. Neurological: Negative for dizziness, weakness, numbness and headaches. Hematological: Negative for adenopathy. Does not bruise/bleed easily. Psychiatric/Behavioral: Negative for behavioral problems, confusion and sleep disturbance. All other systems reviewed and are negative. Vitals:    08/22/19 1558 08/22/19 1714   BP:  107/49   Pulse: 82 94   Resp: 16 15   Temp:  98.7 °F (37.1 °C)   SpO2: 100% 95%            Physical Exam   Constitutional: He appears well-developed and well-nourished. HENT:   Head: Normocephalic and atraumatic. Mouth/Throat: Oropharynx is clear and moist.   Eyes: Pupils are equal, round, and reactive to light. EOM are normal.   Neck: Normal range of motion. Neck supple. Cardiovascular: Normal rate, regular rhythm, normal heart sounds and intact distal pulses. Exam reveals no gallop and no friction rub. No murmur heard. Pulmonary/Chest: Effort normal. No respiratory distress. He has no wheezes. He has no rales. Abdominal: Soft. There is no tenderness. There is no rebound. Musculoskeletal: Normal range of motion. He exhibits no tenderness. Neurological: He is alert. No cranial nerve deficit. Motor; symmetric   Skin: No erythema. Psychiatric: He has a normal mood and affect. His behavior is normal.   Nursing note and vitals reviewed.      Note written by marek Joy, as dictated by Ashli Lund MD 5:15 PM    MDM  Number of Diagnoses or Management Options  Anemia, unspecified type: established and worsening     Amount and/or Complexity of Data Reviewed  Clinical lab tests: reviewed and ordered  Decide to obtain previous medical records or to obtain history from someone other than the patient: yes  Review and summarize past medical records: yes  Discuss the patient with other providers: yes    Risk of Complications, Morbidity, and/or Mortality  Presenting problems: moderate  Diagnostic procedures: moderate  Management options: high    Patient Progress  Patient progress: stable         Procedures    Hospitalist Alisha for Admission  6:32 PM    ED Room Number: ER29/29  Patient Name and age:  Pinky Cordova 70 y.o.  male  Working Diagnosis:   1.  Anemia, unspecified type      Readmission: no  Isolation Requirements:  no  Recommended Level of Care:  med/surg  Code Status:  Full Code  Department:[facility] Adult ED - 21   Other:

## 2019-08-22 NOTE — ED TRIAGE NOTES
Pt referred to ED for blood transfusion and HD. Normally has HD M/W/F, last treatment yesterday. Pt denies SOB, CP, abd pain, blood in stool. Poor historian.

## 2019-08-23 LAB
ANION GAP SERPL CALC-SCNC: 8 MMOL/L (ref 5–15)
ATRIAL RATE: 76 BPM
BASOPHILS # BLD: 0.1 K/UL (ref 0–0.1)
BASOPHILS NFR BLD: 1 % (ref 0–1)
BUN SERPL-MCNC: 21 MG/DL (ref 6–20)
BUN/CREAT SERPL: 3 (ref 12–20)
CALCIUM SERPL-MCNC: 8.8 MG/DL (ref 8.5–10.1)
CALCULATED P AXIS, ECG09: 39 DEGREES
CALCULATED R AXIS, ECG10: 1 DEGREES
CALCULATED T AXIS, ECG11: 46 DEGREES
CHLORIDE SERPL-SCNC: 101 MMOL/L (ref 97–108)
CO2 SERPL-SCNC: 27 MMOL/L (ref 21–32)
CREAT SERPL-MCNC: 7.55 MG/DL (ref 0.7–1.3)
DIAGNOSIS, 93000: NORMAL
DIFFERENTIAL METHOD BLD: ABNORMAL
EOSINOPHIL # BLD: 1.1 K/UL (ref 0–0.4)
EOSINOPHIL NFR BLD: 13 % (ref 0–7)
ERYTHROCYTE [DISTWIDTH] IN BLOOD BY AUTOMATED COUNT: 16.9 % (ref 11.5–14.5)
GLUCOSE SERPL-MCNC: 75 MG/DL (ref 65–100)
HCT VFR BLD AUTO: 23 % (ref 36.6–50.3)
HGB BLD-MCNC: 6.8 G/DL (ref 12.1–17)
IMM GRANULOCYTES # BLD AUTO: 0 K/UL (ref 0–0.04)
IMM GRANULOCYTES NFR BLD AUTO: 0 % (ref 0–0.5)
LYMPHOCYTES # BLD: 1 K/UL (ref 0.8–3.5)
LYMPHOCYTES NFR BLD: 11 % (ref 12–49)
MCH RBC QN AUTO: 27.1 PG (ref 26–34)
MCHC RBC AUTO-ENTMCNC: 29.6 G/DL (ref 30–36.5)
MCV RBC AUTO: 91.6 FL (ref 80–99)
MONOCYTES # BLD: 0.5 K/UL (ref 0–1)
MONOCYTES NFR BLD: 6 % (ref 5–13)
NEUTS SEG # BLD: 6 K/UL (ref 1.8–8)
NEUTS SEG NFR BLD: 69 % (ref 32–75)
NRBC # BLD: 0 K/UL (ref 0–0.01)
NRBC BLD-RTO: 0 PER 100 WBC
P-R INTERVAL, ECG05: 278 MS
PLATELET # BLD AUTO: 249 K/UL (ref 150–400)
PMV BLD AUTO: 10.2 FL (ref 8.9–12.9)
POTASSIUM SERPL-SCNC: 3.8 MMOL/L (ref 3.5–5.1)
Q-T INTERVAL, ECG07: 388 MS
QRS DURATION, ECG06: 88 MS
QTC CALCULATION (BEZET), ECG08: 436 MS
RBC # BLD AUTO: 2.51 M/UL (ref 4.1–5.7)
RBC MORPH BLD: ABNORMAL
SODIUM SERPL-SCNC: 136 MMOL/L (ref 136–145)
VENTRICULAR RATE, ECG03: 76 BPM
WBC # BLD AUTO: 8.7 K/UL (ref 4.1–11.1)

## 2019-08-23 PROCEDURE — 74011250637 HC RX REV CODE- 250/637: Performed by: NURSE PRACTITIONER

## 2019-08-23 PROCEDURE — 74011250636 HC RX REV CODE- 250/636: Performed by: NURSE PRACTITIONER

## 2019-08-23 PROCEDURE — 36430 TRANSFUSION BLD/BLD COMPNT: CPT

## 2019-08-23 PROCEDURE — 74011000250 HC RX REV CODE- 250: Performed by: FAMILY MEDICINE

## 2019-08-23 PROCEDURE — 74011000250 HC RX REV CODE- 250: Performed by: NURSE PRACTITIONER

## 2019-08-23 PROCEDURE — 30233N1 TRANSFUSION OF NONAUTOLOGOUS RED BLOOD CELLS INTO PERIPHERAL VEIN, PERCUTANEOUS APPROACH: ICD-10-PCS | Performed by: FAMILY MEDICINE

## 2019-08-23 PROCEDURE — 85025 COMPLETE CBC W/AUTO DIFF WBC: CPT

## 2019-08-23 PROCEDURE — 80048 BASIC METABOLIC PNL TOTAL CA: CPT

## 2019-08-23 PROCEDURE — 65660000000 HC RM CCU STEPDOWN

## 2019-08-23 PROCEDURE — C9113 INJ PANTOPRAZOLE SODIUM, VIA: HCPCS | Performed by: NURSE PRACTITIONER

## 2019-08-23 PROCEDURE — 74011250636 HC RX REV CODE- 250/636: Performed by: EMERGENCY MEDICINE

## 2019-08-23 PROCEDURE — 74011250636 HC RX REV CODE- 250/636: Performed by: FAMILY MEDICINE

## 2019-08-23 PROCEDURE — 36415 COLL VENOUS BLD VENIPUNCTURE: CPT

## 2019-08-23 PROCEDURE — C9113 INJ PANTOPRAZOLE SODIUM, VIA: HCPCS | Performed by: FAMILY MEDICINE

## 2019-08-23 RX ORDER — SUCRALFATE 1 G/1
1 TABLET ORAL
Status: DISCONTINUED | OUTPATIENT
Start: 2019-08-23 | End: 2019-08-28 | Stop reason: HOSPADM

## 2019-08-23 RX ORDER — SODIUM CHLORIDE 9 MG/ML
250 INJECTION, SOLUTION INTRAVENOUS AS NEEDED
Status: DISCONTINUED | OUTPATIENT
Start: 2019-08-23 | End: 2019-08-28 | Stop reason: HOSPADM

## 2019-08-23 RX ADMIN — SUCRALFATE 1 G: 1 TABLET ORAL at 17:29

## 2019-08-23 RX ADMIN — Medication 10 ML: at 00:00

## 2019-08-23 RX ADMIN — SODIUM CHLORIDE 250 ML: 900 INJECTION, SOLUTION INTRAVENOUS at 02:37

## 2019-08-23 RX ADMIN — SUCRALFATE 1 G: 1 TABLET ORAL at 22:38

## 2019-08-23 RX ADMIN — SODIUM CHLORIDE 40 MG: 9 INJECTION INTRAMUSCULAR; INTRAVENOUS; SUBCUTANEOUS at 22:38

## 2019-08-23 RX ADMIN — SODIUM CHLORIDE 40 MG: 9 INJECTION INTRAMUSCULAR; INTRAVENOUS; SUBCUTANEOUS at 09:37

## 2019-08-23 RX ADMIN — Medication 10 ML: at 06:00

## 2019-08-23 NOTE — PROGRESS NOTES
0100: call from remote telemetry that patient with HR in the 40's and with recent/new 2nd degree heart block, type I and II. In the ER, it was reported that patient with 1st degree heart block. Paged hospitalist to update  0130:  Hospitalist called back and said to monitor. 0700: Hgb 6.8. Paged Dr. Jeanie Saha office (Dr. Nicholas Allen on call) to report the HGB and the rhythm changes. Advised to put in an order for 1 unit of PRBC to be administered during HD. Follow up with Dima regarding possible cardiology consult.

## 2019-08-23 NOTE — H&P
[address] [place]   HISTORY AND PHYSICAL    Name:  Jacqui Reeves  MR#:  [id number]  :  1950  ACCOUNT #:  [de-identified]  ADMIT DATE:  2019      CHIEF COMPLAINT:  The patient does not provide. HISTORY OF PRESENT ILLNESS:  A 69-year-old -American male with past medical history of end-stage renal disease, on hemodialysis (, , and ); chronic anemia; arthritis; gout; prostate cancer, status post prostatectomy; heme-positive stools; hypertension; peripheral vascular disease; transmetatarsal amputation of both feet; presented to the emergency department as a referral from his nephrologist's office with reported anemia. The patient notes \"I am fine. \"  He notably had been seen by his PCP according to ER notes. The patient is a limited historian. Of note, he had recently been hospitalized at [facility] from 2019 to 2019 with diagnosis of GI bleed, hypertension, acute blood loss anemia requiring transfusion of 1 unit of packed red blood cells. The Plavix was held at that time (with history of peripheral vascular disease). He underwent M2 capsule endoscopy on 2019 (however, the capsule did not enter into duodenum) and there was recommendation for repeat capsule endoscopy as outpatient to follow up with GI service. The patient returned tonight, notes his last bowel movement was yesterday. He does not report seeing any blood in his stool. However, he has blindness in his right eye, loss of vision according to his report which is not new. On arrival to the emergency department today, initially recorded vital signs, blood pressure 107/49, heart rate 82, respiratory rate 16, O2 saturation 100% on room air. The workup included labs showing hemoglobin of 6.0.  ED ordered to transfuse 1 unit of packed red blood cells. Hospitalist was then consulted.   On my arrival to the patient's room, the patient does not complain of any dizziness, lightheadedness, new-onset focal weakness, numbness, paresthesias, slurred speech, facial droop, new-onset vision loss (besides the chronic right eye blindness), headache, neck pain, back pain, chest pain, shortness of breath, cough, congestion, abdominal pain, nausea, vomiting, diarrhea, melena, hematuria, calf pain, increased swelling or edema compared to baseline, fever, chills, or rash. I had discussion with the patient in regards to risks, benefits, potential complication of blood transfusion. He notes he will provide informed consent. PAST MEDICAL HISTORY:  1. Anemia (iron deficiency). 2.  Arthritis. 3.  Gout. 4.  Prostate cancer. 5.  End-stage renal disease, on hemodialysis. 6.  GI bleed. 7.  Hypertension. 8.  Kidney disease. 9.  Peripheral vascular disease. 10.  Type 2 diabetes mellitus had been listed on chart records; however, it is also noted the patient denies. PAST SURGICAL HISTORY:  1.  Prostatectomy in 2015.  2.  Bilateral foot transmetatarsal amputations. 3.  Right forearm hemodialysis shunt graft. CURRENT MEDICATIONS:  Medication list reviewed and noted on chart records. ALLERGIES:  NO KNOWN DRUG ALLERGIES. SOCIAL HISTORY:  Positive smoking cigarettes, 1 pack a day. Denies alcohol or illicit drugs. He notes that he uses a walker. He also notes that his daughter lives with him in his home. FAMILY HISTORY:  Kidney disease, mother, brother. Diabetes, sister, brother. Lung cancer, mother. Colon cancer, mother. REVIEW OF SYSTEMS:  Pertinent positives as noted in the HPI, otherwise negative. PHYSICAL EXAMINATION:  VITAL SIGNS:  Temperature 98.7 degrees Fahrenheit, blood pressure 114/44, heart rate of 78, respiratory rate of 14, O2 saturation 98% on room air. GENERAL:  The patient in no acute respiratory distress. PSYCHIATRIC:  The patient is awake, alert, and oriented x3. NEUROLOGIC:  GCS of 15, although slowly answers some questions. Moves extremities x4. Sensation grossly intact without slurred speech or facial droop. HEENT:  Normocephalic, atraumatic. Pupils are 2 mm, reactive. Sclerae are anicteric. Conjunctivae are clear. Nares are patent. Oropharynx is clear. Tongue had some macroglossia (chronic appearing). NECK:  Supple without lymphadenopathy, JVD, carotid bruits, or thyromegaly. No stridor. Nontender. LYMPHATICS:  Negative for cervical or supraclavicular adenopathy. RESPIRATORY:  Lungs with bibasilar rales. CVS:  Heart is regular rate and rhythm, normal S1 and S2, without murmurs, rubs, or gallops. GI:  Abdomen is soft, nontender, nondistended. Normoactive bowel sounds. No rebound, guarding, or rigidity. No auscultated abdominal bruits. No palpable abdominal mass. BACK:  No CVA tenderness. No step-off deformity. MUSCULOSKELETAL:  No acute palpable bony deformity. Negative for calf tenderness. VASCULAR:  2+ radial and 1+ dorsalis pedis pulses without cyanosis. Positive clubbing. Negative for edema. He has a site drilled to the right forearm hemodialysis graft. SKIN:  Warm and dry. Transmetatarsal amputation sites of both feet are intact. There is a wound present on the lateral aspect of the right fifth metatarsal bone. LABORATORY DATA:  Labs are reviewed as follows:  Sodium 136, potassium 3.8, chloride 98, CO2 of 32, BUN of 18, creatinine 6.77, glucose 130, anion gap of 6, calcium 8.9, GFR of 10, total bilirubin 0.2, total protein 7.0, albumin is 2.6, ALT of 20, AST of 37, alkaline phosphatase is 150. WBC of 11.4, hemoglobin of 6.0, hematocrit 20.3, platelets of 052, neutrophils 72%. IMPRESSION AND PLAN:  1. Anemia, acute-on-chronic. I have concerns for possible occult bleed. I performed rectal examination and sent stool occult blood test which is currently pending. I discussed the risks, benefits, potential complication of blood transfusion with the patient.   He is awake, alert, and oriented times three with the medical capacity to make his own decisions. Patient provided informed witnessed written consent freely and without coercion. The plan is to transfuse 1 unit of packed red blood cells and then repeat CBC post transfusion. The patient will be admitted to remote telemetry at this time for further evaluation, monitoring, and treatment. 2.  End-stage renal disease. Continue with hemodialysis. Planned for the next hemodialysis treatment tomorrow. Consult with nephrology regarding the same. 3.  Hypertension. It had been noted on chart records; however, the patient's blood pressure had been on lower range on initial evaluation as such. We will monitor closely. 4.  History of recent gastrointestinal bleed. M2 capsule endoscopy was incomplete. Check results of fecal occult blood test.  If positive, we will then consult with gastroenterology service. 5.  Tobacco abuse. Encouraged smoking cessation. Ordered nicotine patch. 6.   Venous thromboembolism prophylaxis. Sequential compressive devices to lower extremities.       Odalis Young MD [id number]  D:  08/22/2019 19:57  T:  08/22/2019 22:47  JOB #:  [id number]

## 2019-08-23 NOTE — PROGRESS NOTES
1218:  Received call from [facility] (patient's family member). RN did not see her listed in patient chart so patient was asked if he consented to RN giving her updates on patient. Patient approved. [facility] wanted to ensure patient's primary care provider was listed in patient chart. RN informed her that she was. Will continue to monitor patient. Remote telemetry notified RN that patient's heart rate dropped into 30's-40's again but quickly returned into the 80's. RN requested to notify if this occurred again. Provider had been notified prior and said continue to monitor. 1735:  RN was discussing patient's daily medications with patient. Per patient he stated he takes multiple medications at home. RN reviewed PTA medications and noticed they had not yet been reviewed. Called office of Dr. Cici Martell for him to be paged and provided call back number. 2016:  Dr. Cici Martell paged again. 2026:  Spoke to Dr. Cici Martell. Notified him that patient home medications had not been reviewed and listed the medications. Per Dr. Cici Martell orders to resume the medications would be placed tomorrow. 2135:  Patient is on a MWF dialysis schedule. Dialysis had still not come for patient as planned. Paged and spoke to Dr. Cici Martell to notify orders had not been placed. Per Dr. Cici Martell orders would be placed for Nephrology consult and the ordered unit of blood should be administered tonight rather than with hemodialysis. Bedside shift change report given to Sav Gu RN (oncoming nurse) by Florian Paz RN (offgoing nurse). Report included the following information SBAR, Kardex, Intake/Output, MAR and Recent Results.

## 2019-08-23 NOTE — PROGRESS NOTES
Problem: Risk for Spread of Infection  Goal: Prevent transmission of infectious organism to others  Description  Prevent the transmission of infectious organisms to other patients, staff members, and visitors. Outcome: Progressing Towards Goal     Problem: Patient Education:  Go to Education Activity  Goal: Patient/Family Education  Outcome: Progressing Towards Goal     Problem: Falls - Risk of  Goal: *Absence of Falls  Description  Document Ethel Girt Fall Risk and appropriate interventions in the flowsheet. Outcome: Progressing Towards Goal  Note:   Fall Risk Interventions:  Mobility Interventions: Communicate number of staff needed for ambulation/transfer, Patient to call before getting OOB              Elimination Interventions: Call light in reach, Patient to call for help with toileting needs              Problem: Patient Education: Go to Patient Education Activity  Goal: Patient/Family Education  Outcome: Progressing Towards Goal     Problem: Pressure Injury - Risk of  Goal: *Prevention of pressure injury  Description  Document Ankit Scale and appropriate interventions in the flowsheet.   Outcome: Progressing Towards Goal  Note:   Pressure Injury Interventions:  Sensory Interventions: Float heels, Assess need for specialty bed, Discuss PT/OT consult with provider         Activity Interventions: PT/OT evaluation, Pressure redistribution bed/mattress(bed type), Increase time out of bed    Mobility Interventions: PT/OT evaluation, Pressure redistribution bed/mattress (bed type), Float heels    Nutrition Interventions: Offer support with meals,snacks and hydration, Document food/fluid/supplement intake                     Problem: Patient Education: Go to Patient Education Activity  Goal: Patient/Family Education  Outcome: Progressing Towards Goal

## 2019-08-23 NOTE — PROGRESS NOTES
Bedside and Verbal shift change report given to Jose Luis Alvares RN (oncoming nurse) by Harsha Ashford RN (offgoing nurse). Report included the following information SBAR, Kardex, Intake/Output, MAR, Recent Results and Cardiac Rhythm 1st and 2nd degree heart block.

## 2019-08-23 NOTE — ROUTINE PROCESS
TRANSFER - OUT REPORT:    Verbal report given to [facility] RN(name) on Mike Vaughan  being transferred to (unit) for routine progression of care       Report consisted of patients Situation, Background, Assessment and   Recommendations(SBAR). Information from the following report(s) SBAR, ED Summary, Procedure Summary, MAR and Recent Results was reviewed with the receiving nurse. Lines:       Opportunity for questions and clarification was provided.

## 2019-08-23 NOTE — PROGRESS NOTES
Lucinda Farooq, Dariana Joya, and Azam    Admit Date: 8/22/2019      Subjective:     Patient feeling OK this AM. Hgb low in 6s. Stool heme +. Transfusion due today. GI consult and nephrology to see today. .       Current Facility-Administered Medications   Medication Dose Route Frequency    pantoprazole (PROTONIX) 40 mg in sodium chloride 0.9% 10 mL injection  40 mg IntraVENous Q24H    0.9% sodium chloride infusion 250 mL  250 mL IntraVENous PRN    sodium chloride (NS) flush 5-40 mL  5-40 mL IntraVENous Q8H    sodium chloride (NS) flush 5-40 mL  5-40 mL IntraVENous PRN          Objective:     Patient Vitals for the past 8 hrs:   BP Temp Pulse Resp SpO2   08/23/19 0300 124/70 97.8 °F (36.6 °C) 80 16 96 %   08/23/19 0200 132/74 97.9 °F (36.6 °C) 76 16 99 %   08/23/19 0126 144/75 -- -- -- --   08/23/19 0125 159/81 97.8 °F (36.6 °C) 82 16 100 %   08/23/19 0045 121/64 97.8 °F (36.6 °C) 82 16 97 %   08/23/19 0015 129/69 97.8 °F (36.6 °C) 76 16 100 %   08/22/19 2359 106/66 97.7 °F (36.5 °C) 79 16 99 %     No intake/output data recorded. 08/21 1901 - 08/23 0700  In: 550   Out: -     Physical Exam: Lungs: clear to auscultation bilaterally  Heart: regular rate and rhythm, S1, S2 normal, no murmur, click, rub or gallop  Abdomen: soft, non-tender. Bowel sounds normal. No masses,  no organomegaly        Data Review   Recent Results (from the past 24 hour(s))   SAMPLES BEING HELD    Collection Time: 08/22/19  5:35 PM   Result Value Ref Range    SAMPLES BEING HELD 1red,1blue     COMMENT        Add-on orders for these samples will be processed based on acceptable specimen integrity and analyte stability, which may vary by analyte.    CBC WITH AUTOMATED DIFF    Collection Time: 08/22/19  5:35 PM   Result Value Ref Range    WBC 11.4 (H) 4.1 - 11.1 K/uL    RBC 2.20 (L) 4.10 - 5.70 M/uL    HGB 6.0 (L) 12.1 - 17.0 g/dL    HCT 20.3 (L) 36.6 - 50.3 %    MCV 92.3 80.0 - 99.0 FL    MCH 27.3 26.0 - 34.0 PG    MCHC 29.6 (L) 30.0 - 36.5 g/dL    RDW 18.4 (H) 11.5 - 14.5 %    PLATELET 137 597 - 311 K/uL    MPV 11.2 8.9 - 12.9 FL    NRBC 0.0 0  WBC    ABSOLUTE NRBC 0.00 0.00 - 0.01 K/uL    NEUTROPHILS 72 32 - 75 %    LYMPHOCYTES 10 (L) 12 - 49 %    MONOCYTES 7 5 - 13 %    EOSINOPHILS 11 (H) 0 - 7 %    BASOPHILS 0 0 - 1 %    IMMATURE GRANULOCYTES 0 0.0 - 0.5 %    ABS. NEUTROPHILS 8.2 (H) 1.8 - 8.0 K/UL    ABS. LYMPHOCYTES 1.1 0.8 - 3.5 K/UL    ABS. MONOCYTES 0.8 0.0 - 1.0 K/UL    ABS. EOSINOPHILS 1.3 (H) 0.0 - 0.4 K/UL    ABS. BASOPHILS 0.0 0.0 - 0.1 K/UL    ABS. IMM. GRANS. 0.0 0.00 - 0.04 K/UL    DF AUTOMATED      RBC COMMENTS HYPOCHROMIA  1+        RBC COMMENTS ANISOCYTOSIS  1+        RBC COMMENTS DALI CELLS  PRESENT       METABOLIC PANEL, COMPREHENSIVE    Collection Time: 08/22/19  5:35 PM   Result Value Ref Range    Sodium 136 136 - 145 mmol/L    Potassium 3.8 3.5 - 5.1 mmol/L    Chloride 98 97 - 108 mmol/L    CO2 32 21 - 32 mmol/L    Anion gap 6 5 - 15 mmol/L    Glucose 113 (H) 65 - 100 mg/dL    BUN 18 6 - 20 MG/DL    Creatinine 6.77 (H) 0.70 - 1.30 MG/DL    BUN/Creatinine ratio 3 (L) 12 - 20      GFR est AA 10 (L) >60 ml/min/1.73m2    GFR est non-AA 8 (L) >60 ml/min/1.73m2    Calcium 8.9 8.5 - 10.1 MG/DL    Bilirubin, total 0.2 0.2 - 1.0 MG/DL    ALT (SGPT) 20 12 - 78 U/L    AST (SGOT) 37 15 - 37 U/L    Alk.  phosphatase 150 (H) 45 - 117 U/L    Protein, total 7.0 6.4 - 8.2 g/dL    Albumin 2.6 (L) 3.5 - 5.0 g/dL    Globulin 4.4 (H) 2.0 - 4.0 g/dL    A-G Ratio 0.6 (L) 1.1 - 2.2     TYPE + CROSSMATCH    Collection Time: 08/22/19  5:35 PM   Result Value Ref Range    Crossmatch Expiration 08/25/2019     ABO/Rh(D) O POSITIVE     Antibody screen NEG     Unit number [id number]     Blood component type  LR     Unit division 00     Status of unit ALLOCATED     Crossmatch result Compatible     Unit number [id number]     Blood component type  LR,2     Unit division 00     Status of unit TRANSFUSED     Crossmatch result Compatible OCCULT BLOOD, STOOL    Collection Time: 08/22/19  7:39 PM   Result Value Ref Range    Occult blood, stool POSITIVE (A) NEG     EKG, 12 LEAD, INITIAL    Collection Time: 08/22/19  8:21 PM   Result Value Ref Range    Ventricular Rate 76 BPM    Atrial Rate 76 BPM    P-R Interval 278 ms    QRS Duration 88 ms    Q-T Interval 388 ms    QTC Calculation (Bezet) 436 ms    Calculated P Axis 39 degrees    Calculated R Axis 1 degrees    Calculated T Axis 46 degrees    Diagnosis       Sinus rhythm with 1st degree AV block  When compared with ECG of 09-AUG-2019 12:03,  No significant change was found     METABOLIC PANEL, BASIC    Collection Time: 08/23/19  4:53 AM   Result Value Ref Range    Sodium 136 136 - 145 mmol/L    Potassium 3.8 3.5 - 5.1 mmol/L    Chloride 101 97 - 108 mmol/L    CO2 27 21 - 32 mmol/L    Anion gap 8 5 - 15 mmol/L    Glucose 75 65 - 100 mg/dL    BUN 21 (H) 6 - 20 MG/DL    Creatinine 7.55 (H) 0.70 - 1.30 MG/DL    BUN/Creatinine ratio 3 (L) 12 - 20      GFR est AA 9 (L) >60 ml/min/1.73m2    GFR est non-AA 7 (L) >60 ml/min/1.73m2    Calcium 8.8 8.5 - 10.1 MG/DL   CBC WITH AUTOMATED DIFF    Collection Time: 08/23/19  4:53 AM   Result Value Ref Range    WBC 8.7 4.1 - 11.1 K/uL    RBC 2.51 (L) 4.10 - 5.70 M/uL    HGB 6.8 (L) 12.1 - 17.0 g/dL    HCT 23.0 (L) 36.6 - 50.3 %    MCV 91.6 80.0 - 99.0 FL    MCH 27.1 26.0 - 34.0 PG    MCHC 29.6 (L) 30.0 - 36.5 g/dL    RDW 16.9 (H) 11.5 - 14.5 %    PLATELET 075 962 - 484 K/uL    MPV 10.2 8.9 - 12.9 FL    NRBC 0.0 0  WBC    ABSOLUTE NRBC 0.00 0.00 - 0.01 K/uL    NEUTROPHILS 69 32 - 75 %    LYMPHOCYTES 11 (L) 12 - 49 %    MONOCYTES 6 5 - 13 %    EOSINOPHILS 13 (H) 0 - 7 %    BASOPHILS 1 0 - 1 %    IMMATURE GRANULOCYTES 0 0.0 - 0.5 %    ABS. NEUTROPHILS 6.0 1.8 - 8.0 K/UL    ABS. LYMPHOCYTES 1.0 0.8 - 3.5 K/UL    ABS. MONOCYTES 0.5 0.0 - 1.0 K/UL    ABS. EOSINOPHILS 1.1 (H) 0.0 - 0.4 K/UL    ABS. BASOPHILS 0.1 0.0 - 0.1 K/UL    ABS. IMM.  GRANS. 0.0 0.00 - 0.04 K/UL DF SMEAR SCANNED      RBC COMMENTS ANISOCYTOSIS  1+        RBC COMMENTS POLYCHROMASIA  PRESENT        RBC COMMENTS DALI CELLS  PRESENT        RBC COMMENTS OVALOCYTES  PRESENT        RBC COMMENTS TARGET CELLS  PRESENT               Assessment:     Active Problems:    ESRD (end stage renal disease) ([facility] (7/21/2011)      Anemia (8/22/2019)        Plan:     1) Start Protonix IV  2) transfusion 1 unit  3) GI to see  4) Nephrology to see

## 2019-08-23 NOTE — CONSULTS
[address] [place] [address] Trang NOTE  Nicola Curry [facility] office  [phone] NP in-hospital cell phone M-F until 4:30  After 5pm or on weekends, please call  for physician on call        NAME:  Pinky Cordova   :   1950   MRN:   [id number]       Referring Physician: Joel Metcalf    Consult Date: 2019 10:24 AM    Chief Complaint: GI bleed/heme positive stool     History of Present Illness:  Patient is a 70 y.o. who is seen in consultation at the request of Dr. Joel Metcalf for GI bleed/heme positive stool. Past medical history as listed below includes ESRD, anemia. He presented from dialysis for anemia. He was hospitalized -19 during that time he had one episode of BRBPR on wiping. Hgb 6.0-->6.8 after 1 unit pRBC's. He reports soft brown bowel movement yesterday. He denies any symptoms - no nausea, reflux, abdominal pain, change in bowel habits, melena, or hematochezia. He feels fine and does not want to be in the hospital.     No NSAID's or alcohol. +tobacco   M2A 19: ulcer in the mid small bowel, blood throughout second half of study, capsule never reached cecum (plan if patient continues to bleed may need laparotomy)   EGD 2017: small sliding hiatal hernia  Colonoscopy 2017: external hemorrhoids, transverse colon 8 mm tubular adenoma, medium diverticula throughout sigmoid and ascending colon; 5 years  M2A 19: inadequate preparation, capsule did not enter the duodenum for approximately 5 hours    I have reviewed the emergency room note, hospital admission note, notes by all other clinicians who have seen the patient during this hospitalization to date. I have reviewed the problem list and the reason for this hospitalization. I have reviewed the allergies and the medications the patient was taking at home prior to this hospitalization.     PMH:  Past Medical History:   Diagnosis Date    Arthritis     GOUT    Cancer ([facility] 2015    PROSTATE    Chronic kidney disease     KIDNEY FAILURE    Dialysis patient ([facility] 10/11/2016    ESRD on dialysis ([facility] 8/23/2016    Heme positive stool 4/1/2017    3/31/17    HTN (hypertension) 7/21/2011    Kidney disease 7/21/2011    Peripheral vascular disease ([facility] 7/26/2017    S/P transmetatarsal amputation of foot, right (HCC)     Toe amputation status ([facility] 8/23/2016    Type II or unspecified type diabetes mellitus without mention of complication, not stated as uncontrolled     PATIENT DENIES       PSH:  Past Surgical History:   Procedure Laterality Date    HX PROSTATECTOMY  OCT 2015    BIOPSY ONLY    VASCULAR SURGERY PROCEDURE UNLIST  6/21/11    UPPER R CHEST DIALYSIS ACCESS    VASCULAR SURGERY PROCEDURE UNLIST  2011    SHUNT RIGHT FOREARM FOR DIALYSIS CHEST ACCESS REMOVED       Allergies:  No Known Allergies    Home Medications:  Prior to Admission Medications   Prescriptions Last Dose Informant Patient Reported? Taking? PROAIR HFA 90 mcg/actuation inhaler 8/22/2019 at Unknown time  No Yes   Sig: INHALE TWO PUFFS BY MOUTH EVERY 4 HOURS AS NEEDED FOR SHORTNESS OF BREATH   aspirin delayed-release 81 mg tablet 8/22/2019 at Unknown time  Yes Yes   Sig: Take 81 mg by mouth daily. b complex-vitamin c-folic acid 0.8 mg (NEPHRO-JM) 0.8 mg tab tablet 8/22/2019 at Unknown time  Yes Yes   Sig: Take 1 Tab by mouth daily. latanoprost (XALATAN) 0.005 % ophthalmic solution 8/21/2019 at Unknown time  Yes Yes   Sig: Administer 1 Drop to both eyes nightly. metoprolol tartrate (LOPRESSOR) 25 mg tablet 8/22/2019 at Unknown time  Yes Yes   Sig: Take 25 mg by mouth two (2) times a day. pantoprazole (PROTONIX) 40 mg tablet 8/22/2019 at Unknown time  Yes Yes   Sig: Take 40 mg by mouth two (2) times a day. sucralfate (CARAFATE) 1 gram tablet 8/22/2019 at Unknown time  No Yes   Sig: Take 1 Tab by mouth Before breakfast, lunch, dinner and at bedtime for 30 days. umeclidinium-vilanterol (ANORO ELLIPTA) 62.5-25 mcg/actuation inhaler 8/21/2019 at Unknown time  Yes Yes   Sig: Take 1 Puff by inhalation daily.       Facility-Administered Medications: None       Hospital Medications:  Current Facility-Administered Medications   Medication Dose Route Frequency    pantoprazole (PROTONIX) 40 mg in sodium chloride 0.9% 10 mL injection  40 mg IntraVENous Q24H    0.9% sodium chloride infusion 250 mL  250 mL IntraVENous PRN    sodium chloride (NS) flush 5-40 mL  5-40 mL IntraVENous Q8H    sodium chloride (NS) flush 5-40 mL  5-40 mL IntraVENous PRN       Social History:  Social History     Tobacco Use    Smoking status: Current Every Day Smoker     Packs/day: 2.00     Years: 55.00     Pack years: 110.00    Smokeless tobacco: Never Used   Substance Use Topics    Alcohol use: No     Comment: STOPPED DRINKING IN [address] [place] [address] ALCOHOLIC-QUIT ON HIS OWN       Family History:  Family History   Problem Relation Age of Onset    Cancer Mother         LUNG AND COLON    Kidney Disease Mother     Hypertension Father     Hypertension Sister     Diabetes Brother     Kidney Disease Brother     Diabetes Sister     Diabetes Sister     Anesth Problems Neg Hx        Review of Systems:  Constitutional: negative fever, negative chills, negative weight loss  Eyes:   negative visual changes  ENT:   negative sore throat, tongue or lip swelling  Respiratory:  negative cough, negative dyspnea  Cards:  negative for chest pain, palpitations, lower extremity edema  GI:   See HPI  :  negative for frequency, dysuria  Integument:  negative for rash and pruritus  Heme:  negative for easy bruising and gum/nose bleeding  Musculoskeletal:negative for myalgias, back pain and muscle weakness  Neuro:  negative for headaches, dizziness, vertigo  Psych:  negative for feelings of anxiety, depression     Objective:     Patient Vitals for the past 8 hrs:   BP Temp Pulse Resp SpO2 Weight   08/23/19 0933 147/74 97.9 °F (36.6 °C) 79 16 99 % --   08/23/19 0932 -- -- -- -- -- 82.9 kg (182 lb 12.2 oz)   08/23/19 0300 124/70 97.8 °F (36.6 °C) 80 16 96 % --     No intake/output data recorded. 08/21 1901 - 08/23 0700  In: 550   Out: -     EXAM:     CONST:  Pleasant male lying in bed, no acute distress   NEURO:  alert and oriented x 3   HEENT: EOMI, no scleral icterus   LUNGS: clear to ausculation, (-) wheeze   CARD:  S1 S2   ABD:  soft, no tenderness, no rebound, bowel sounds (+) all 4 quadrants, no masses, non distended   EXT:  warm   PSYCH: full, not anxious     Data Review     Recent Labs     08/23/19  0453 08/22/19  1735   WBC 8.7 11.4*   HGB 6.8* 6.0*   HCT 23.0* 20.3*    284     Recent Labs     08/23/19  0453 08/22/19  1735    136   K 3.8 3.8    98   CO2 27 32   BUN 21* 18   CREA 7.55* 6.77*   GLU 75 113*   CA 8.8 8.9     Recent Labs     08/22/19  1735   SGOT 37   *   TP 7.0   ALB 2.6*   GLOB 4.4*     No results for input(s): INR, PTP, APTT in the last 72 hours. No lab exists for component: INREXT       Assessment:     · Anemia: 6.0-->6.8 status post 1 unit pRBC's, midsmall bowel bleeding ulcer on M2A on 7/14/19 - M2A 8/14 capsule never reached duodenum   · ESRD: nephrology consulted, on HD and EPO  · On Plavix PTA - last dose 8/21     Patient Active Problem List   Diagnosis Code    Kidney disease N28.9    ESRD (end stage renal disease) ([address] N18.6    Toe amputation status ([address] T71.605X    Type 2 diabetes, diet controlled ([address] E11.9    ESRD on dialysis ([address] N18.6, Z99.2    Dialysis patient ([address] Z99.2    Other specified anemias D64.89    Severe anemia D64.9    Heme positive stool R19.5    Peripheral vascular disease ([facility] I73.9    S/P transmetatarsal amputation of foot, right ([facility] Z89.431    GI bleed K92.2    Anemia D64.9     Plan:     · Carafate  · CLD and NPO after midnight Sunday night for EGD with deployment of M2A capsule endoscopy into the duodenum.    · Hold Plavix as able  · PPI increased to BID  · Trend CBC, transfuse as necessary  · Patient discussed with and will be seen by Dr. Julianne Archer  · Thank you for allowing me to participate in care of Carmencita Hurley 10 By: Dequan Silva NP     8/23/2019  10:24 AM       Gastroenterology Attending Physician attestation statement and comments. This patient was seen and examined by me in a face-to-face visit today. I reviewed the medical record including lab work, imaging and other provider notes. I confirmed the history as described above. I spoke to the patient, reviewed the medical record including lab work, imaging and other provider notes. I discussed this case in detail with Gary Julian NP. I formulated an  assessment of this patient and developed a treatment plan. I agree with the above consultation note. I agree with the history, exam and assessment and plan as outlined in the note. I would like to add the following: His M2A capsule endoscopy during the last hospitalization was inadequately prepped and the capsule was in the stomach for 5 hours. Will plan for EGD with deployment of the capsule in the duodenum on Monday by one of my colleagues. I have advised a PEG 3350 preparation on Sunday, but he has declined doing this. He has agreed to be on a clear liquid diet this weekend and NPO after midnight on Sunday night for procedure on Monday. Weekend team to see on request. Please call with any questions. Ky Archer MD

## 2019-08-24 LAB
BASOPHILS # BLD: 0 K/UL (ref 0–0.1)
BASOPHILS NFR BLD: 0 % (ref 0–1)
DIFFERENTIAL METHOD BLD: ABNORMAL
EOSINOPHIL # BLD: 1.1 K/UL (ref 0–0.4)
EOSINOPHIL NFR BLD: 12 % (ref 0–7)
ERYTHROCYTE [DISTWIDTH] IN BLOOD BY AUTOMATED COUNT: 17 % (ref 11.5–14.5)
HCT VFR BLD AUTO: 20.5 % (ref 36.6–50.3)
HGB BLD-MCNC: 6.3 G/DL (ref 12.1–17)
IMM GRANULOCYTES # BLD AUTO: 0 K/UL (ref 0–0.04)
IMM GRANULOCYTES NFR BLD AUTO: 0 % (ref 0–0.5)
LYMPHOCYTES # BLD: 1.1 K/UL (ref 0.8–3.5)
LYMPHOCYTES NFR BLD: 12 % (ref 12–49)
MCH RBC QN AUTO: 27.6 PG (ref 26–34)
MCHC RBC AUTO-ENTMCNC: 30.7 G/DL (ref 30–36.5)
MCV RBC AUTO: 89.9 FL (ref 80–99)
MONOCYTES # BLD: 0.6 K/UL (ref 0–1)
MONOCYTES NFR BLD: 6 % (ref 5–13)
NEUTS SEG # BLD: 6.6 K/UL (ref 1.8–8)
NEUTS SEG NFR BLD: 70 % (ref 32–75)
NRBC # BLD: 0 K/UL (ref 0–0.01)
NRBC BLD-RTO: 0 PER 100 WBC
PLATELET # BLD AUTO: 247 K/UL (ref 150–400)
PMV BLD AUTO: 10.6 FL (ref 8.9–12.9)
RBC # BLD AUTO: 2.28 M/UL (ref 4.1–5.7)
RBC MORPH BLD: ABNORMAL
WBC # BLD AUTO: 9.4 K/UL (ref 4.1–11.1)

## 2019-08-24 PROCEDURE — 74011250637 HC RX REV CODE- 250/637: Performed by: NURSE PRACTITIONER

## 2019-08-24 PROCEDURE — 74011000250 HC RX REV CODE- 250: Performed by: FAMILY MEDICINE

## 2019-08-24 PROCEDURE — C9113 INJ PANTOPRAZOLE SODIUM, VIA: HCPCS | Performed by: NURSE PRACTITIONER

## 2019-08-24 PROCEDURE — 74011250636 HC RX REV CODE- 250/636: Performed by: NURSE PRACTITIONER

## 2019-08-24 PROCEDURE — 5A1D70Z PERFORMANCE OF URINARY FILTRATION, INTERMITTENT, LESS THAN 6 HOURS PER DAY: ICD-10-PCS | Performed by: INTERNAL MEDICINE

## 2019-08-24 PROCEDURE — 85025 COMPLETE CBC W/AUTO DIFF WBC: CPT

## 2019-08-24 PROCEDURE — 65660000000 HC RM CCU STEPDOWN

## 2019-08-24 PROCEDURE — 74011250637 HC RX REV CODE- 250/637: Performed by: FAMILY MEDICINE

## 2019-08-24 PROCEDURE — 94640 AIRWAY INHALATION TREATMENT: CPT

## 2019-08-24 PROCEDURE — 36430 TRANSFUSION BLD/BLD COMPNT: CPT

## 2019-08-24 PROCEDURE — 74011000250 HC RX REV CODE- 250: Performed by: NURSE PRACTITIONER

## 2019-08-24 PROCEDURE — P9016 RBC LEUKOCYTES REDUCED: HCPCS

## 2019-08-24 PROCEDURE — 90935 HEMODIALYSIS ONE EVALUATION: CPT

## 2019-08-24 PROCEDURE — 36415 COLL VENOUS BLD VENIPUNCTURE: CPT

## 2019-08-24 RX ORDER — IPRATROPIUM BROMIDE AND ALBUTEROL SULFATE 2.5; .5 MG/3ML; MG/3ML
3 SOLUTION RESPIRATORY (INHALATION)
Status: DISCONTINUED | OUTPATIENT
Start: 2019-08-24 | End: 2019-08-25

## 2019-08-24 RX ORDER — LATANOPROST 50 UG/ML
1 SOLUTION/ DROPS OPHTHALMIC
Status: DISCONTINUED | OUTPATIENT
Start: 2019-08-24 | End: 2019-08-28 | Stop reason: HOSPADM

## 2019-08-24 RX ORDER — METOPROLOL TARTRATE 25 MG/1
25 TABLET, FILM COATED ORAL 2 TIMES DAILY
Status: DISCONTINUED | OUTPATIENT
Start: 2019-08-24 | End: 2019-08-28 | Stop reason: HOSPADM

## 2019-08-24 RX ORDER — ALBUTEROL SULFATE 90 UG/1
2 AEROSOL, METERED RESPIRATORY (INHALATION)
Status: DISCONTINUED | OUTPATIENT
Start: 2019-08-24 | End: 2019-08-24 | Stop reason: CLARIF

## 2019-08-24 RX ORDER — IPRATROPIUM BROMIDE AND ALBUTEROL SULFATE 2.5; .5 MG/3ML; MG/3ML
3 SOLUTION RESPIRATORY (INHALATION) EVERY 6 HOURS
Status: DISCONTINUED | OUTPATIENT
Start: 2019-08-24 | End: 2019-08-24

## 2019-08-24 RX ORDER — ALBUTEROL SULFATE 0.83 MG/ML
2.5 SOLUTION RESPIRATORY (INHALATION)
Status: DISCONTINUED | OUTPATIENT
Start: 2019-08-24 | End: 2019-08-28 | Stop reason: HOSPADM

## 2019-08-24 RX ADMIN — IPRATROPIUM BROMIDE AND ALBUTEROL SULFATE 3 ML: .5; 3 SOLUTION RESPIRATORY (INHALATION) at 13:17

## 2019-08-24 RX ADMIN — SODIUM CHLORIDE 40 MG: 9 INJECTION INTRAMUSCULAR; INTRAVENOUS; SUBCUTANEOUS at 10:17

## 2019-08-24 RX ADMIN — SUCRALFATE 1 G: 1 TABLET ORAL at 18:49

## 2019-08-24 RX ADMIN — Medication 10 ML: at 07:52

## 2019-08-24 RX ADMIN — METOPROLOL TARTRATE 25 MG: 25 TABLET ORAL at 18:00

## 2019-08-24 RX ADMIN — Medication 10 ML: at 22:06

## 2019-08-24 RX ADMIN — Medication 10 ML: at 15:53

## 2019-08-24 RX ADMIN — ASCORBIC ACID, THIAMINE MONONITRATE,RIBOFLAVIN, NIACINAMIDE, PYRIDOXINE HYDROCHLORIDE, FOLIC ACID, CYANOCOBALAMIN, BIOTIN, CALCIUM PANTOTHENATE, 1 CAPSULE: 100; 1.5; 1.7; 20; 10; 1; 6000; 150000; 5 CAPSULE, LIQUID FILLED ORAL at 12:39

## 2019-08-24 RX ADMIN — SUCRALFATE 1 G: 1 TABLET ORAL at 07:52

## 2019-08-24 RX ADMIN — SODIUM CHLORIDE 40 MG: 9 INJECTION INTRAMUSCULAR; INTRAVENOUS; SUBCUTANEOUS at 22:06

## 2019-08-24 RX ADMIN — SUCRALFATE 1 G: 1 TABLET ORAL at 12:39

## 2019-08-24 RX ADMIN — METOPROLOL TARTRATE 25 MG: 25 TABLET ORAL at 12:39

## 2019-08-24 RX ADMIN — SUCRALFATE 1 G: 1 TABLET ORAL at 22:06

## 2019-08-24 RX ADMIN — IPRATROPIUM BROMIDE AND ALBUTEROL SULFATE 3 ML: .5; 3 SOLUTION RESPIRATORY (INHALATION) at 04:00

## 2019-08-24 NOTE — PROGRESS NOTES
Dialysis orders placed. [facility] called awaiting call back from on call. 1029:  Dialysis nurse called back and acknowledged orders and will be here to do dialysis today.

## 2019-08-24 NOTE — PROGRESS NOTES
Lucinda Ramey, Doak Fabry, and Jonathon America Date: 8/22/2019      Subjective:     Patient feeling OK today. .       Current Facility-Administered Medications   Medication Dose Route Frequency    0.9% sodium chloride infusion 250 mL  250 mL IntraVENous PRN    pantoprazole (PROTONIX) 40 mg in sodium chloride 0.9% 10 mL injection  40 mg IntraVENous Q12H    sucralfate (CARAFATE) tablet 1 g  1 g Oral AC&HS    sodium chloride (NS) flush 5-40 mL  5-40 mL IntraVENous Q8H    sodium chloride (NS) flush 5-40 mL  5-40 mL IntraVENous PRN          Objective:     Patient Vitals for the past 8 hrs:   BP Temp Pulse Resp SpO2   08/24/19 0741 143/71 97.8 °F (36.6 °C) 84 16 98 %   08/24/19 0521 130/68 98 °F (36.7 °C) 85 16 99 %   08/24/19 0506 147/72 97.1 °F (36.2 °C) 81 16 100 %   08/24/19 0451 136/75 98.1 °F (36.7 °C) 83 16 98 %   08/24/19 0446 137/71 98.1 °F (36.7 °C) 86 16 97 %   08/24/19 0200 140/68 97.3 °F (36.3 °C) 80 16 98 %     08/24 0701 - 08/24 1900  In: 341.7   Out: -   08/22 1901 - 08/24 0700  In: 670 [I.V.:120]  Out: -     Physical Exam: Lungs: clear to auscultation bilaterally  Heart: regular rate and rhythm, S1, S2 normal, no murmur, click, rub or gallop  Abdomen: soft, non-tender.  Bowel sounds normal. No masses,  no organomegaly        Data Review   Recent Results (from the past 24 hour(s))   CBC WITH AUTOMATED DIFF    Collection Time: 08/24/19  1:30 AM   Result Value Ref Range    WBC 9.4 4.1 - 11.1 K/uL    RBC 2.28 (L) 4.10 - 5.70 M/uL    HGB 6.3 (L) 12.1 - 17.0 g/dL    HCT 20.5 (L) 36.6 - 50.3 %    MCV 89.9 80.0 - 99.0 FL    MCH 27.6 26.0 - 34.0 PG    MCHC 30.7 30.0 - 36.5 g/dL    RDW 17.0 (H) 11.5 - 14.5 %    PLATELET 066 302 - 843 K/uL    MPV 10.6 8.9 - 12.9 FL    NRBC 0.0 0  WBC    ABSOLUTE NRBC 0.00 0.00 - 0.01 K/uL    NEUTROPHILS 70 32 - 75 %    LYMPHOCYTES 12 12 - 49 %    MONOCYTES 6 5 - 13 %    EOSINOPHILS 12 (H) 0 - 7 %    BASOPHILS 0 0 - 1 %    IMMATURE GRANULOCYTES 0 0.0 - 0.5 % ABS. NEUTROPHILS 6.6 1.8 - 8.0 K/UL    ABS. LYMPHOCYTES 1.1 0.8 - 3.5 K/UL    ABS. MONOCYTES 0.6 0.0 - 1.0 K/UL    ABS. EOSINOPHILS 1.1 (H) 0.0 - 0.4 K/UL    ABS. BASOPHILS 0.0 0.0 - 0.1 K/UL    ABS. IMM.  GRANS. 0.0 0.00 - 0.04 K/UL    DF SMEAR SCANNED      RBC COMMENTS TEARDROP CELLS  PRESENT        RBC COMMENTS OVALOCYTES  PRESENT        RBC COMMENTS HYPOCHROMIA  1+        RBC COMMENTS ANISOCYTOSIS  1+               Assessment:     Active Problems:    ESRD (end stage renal disease) ([facility] (7/21/2011)      Anemia (8/22/2019)        Plan:     1) Nephrology to see today- likely dialysis  2) 1 unit blood during dialysis  3) EGD and capsule placement on Monday  4) OOB to chair as kayley

## 2019-08-24 NOTE — PROCEDURES
[facility]  [phone]    Vitals   Pre   Post   Assessment   Pre   Post     Temp  Temp: 97.8 °F (36.6 °C) (08/24/19 1440) 97.7   LOC  A&Ox4 A&Ox4   HR   Pulse (Heart Rate): 69 (08/24/19 1440) 68   Lungs   CTA, RA Remains on RA   B/P   BP: 139/67 (08/24/19 1440) 143/70 Cardiac   S1S2, RRR No changes   Resp   Resp Rate: 16 (08/24/19 1440) 18 Skin   Warm, dry and intact Warm, dry   Pain level  Pain Intensity 1: 0 (08/24/19 0200) 0 Edema  None   No changes   Orders:    Duration:   Start:    14:40 End:    18:10 Total:   3.5 hrs   Dialyzer:   Dialyzer/Set Up Inspection: Revaclear (08/24/19 1440)   K Bath:   Dialysate K (mEq/L): 3.5 (08/24/19 1440)   Ca Bath:   Dialysate CA (mEq/L): 2.5 (08/24/19 1440)   Na/Bicarb:   Dialysate NA (mEq/L): 140 (08/24/19 1440)   Target Fluid Removal:   Goal/Amount of Fluid to Remove (mL): 1500 mL (08/24/19 1440)   Access     Type & Location:   RLA AVG   Labs     Obtained/Reviewed   Critical Results Called   Date when labs were drawn-  Hgb-    HGB   Date Value Ref Range Status   08/24/2019 6.3 (L) 12.1 - 17.0 g/dL Final     Comment:     RESULTS REPEATED     K-    Potassium   Date Value Ref Range Status   08/23/2019 3.8 3.5 - 5.1 mmol/L Final     Ca-   Calcium   Date Value Ref Range Status   08/23/2019 8.8 8.5 - 10.1 MG/DL Final     Bun-   BUN   Date Value Ref Range Status   08/23/2019 21 (H) 6 - 20 MG/DL Final     Creat-   Creatinine   Date Value Ref Range Status   08/23/2019 7.55 (H) 0.70 - 1.30 MG/DL Final      Medications/ Blood Products Given     Name   Dose   Route and Time     None given                Blood Volume Processed (BVP):    78.1 Net Fluid   Removed:  1500 mL   Comments   Time Out Done: 14:30  Primary Nurse Rpt Pre: Toribio Shirley RN  Primary Nurse Rpt Post: Toribio Shirley RN  Pt Education: Consent, access care  Care Plan: HD today  Tx Summary:  1430: Safety checks complete, timeout performed.    1440: RLA AVG assessed, +bruit/thrill, no redness, warmth or drainage. Skin prepped per procedure using alcohol x 60 sec each site. Cannulated with 15 g needles each site and secured with tape. +flash/aspiration/flsuh. HD initiated. Access visible, lines secure. 1810: HD treatment complete. All possible blood rinsed back. De-cannulated and pressure held until hemostasis achieved. +bruit/thrill. Dressing applied. VSS. HD tolerated well. SBAR to primary RN. Patient remains in bed with call bell in reach.     Admitting Diagnosis: Low hgb  Pt's previous clinic: [facility]  Informed Consent Verified: Yes (08/24/19 1440)  [facility] Consent: Obtained  Hepatitis Status: HBsAg: Neg (07/10/19) HBsAb: 60/immune (07/10/19)  Machine Number: [id number] (08/24/19 1440)  Telemetry status: Remote monitoring

## 2019-08-24 NOTE — PROGRESS NOTES
No diet ordered. Calling Gastro on call to get diet orders. 1150: Gastro returned call and liquid diet ordered.

## 2019-08-24 NOTE — PROGRESS NOTES
Problem: Risk for Spread of Infection  Goal: Prevent transmission of infectious organism to others  Description  Prevent the transmission of infectious organisms to other patients, staff members, and visitors. Outcome: Progressing Towards Goal     Problem: Patient Education:  Go to Education Activity  Goal: Patient/Family Education  Outcome: Progressing Towards Goal     Problem: Falls - Risk of  Goal: *Absence of Falls  Description  Document Obdulio Giselleitz Fall Risk and appropriate interventions in the flowsheet. Outcome: Progressing Towards Goal  Note:   Fall Risk Interventions:  Mobility Interventions: Communicate number of staff needed for ambulation/transfer, Patient to call before getting OOB              Elimination Interventions: Call light in reach, Patient to call for help with toileting needs              Problem: Patient Education: Go to Patient Education Activity  Goal: Patient/Family Education  Outcome: Progressing Towards Goal     Problem: Pressure Injury - Risk of  Goal: *Prevention of pressure injury  Description  Document Ankit Scale and appropriate interventions in the flowsheet.   Outcome: Progressing Towards Goal  Note:   Pressure Injury Interventions:  Sensory Interventions: Keep linens dry and wrinkle-free, Maintain/enhance activity level, Assess need for specialty bed    Moisture Interventions: Assess need for specialty bed, Apply protective barrier, creams and emollients    Activity Interventions: PT/OT evaluation, Pressure redistribution bed/mattress(bed type)    Mobility Interventions: PT/OT evaluation, Pressure redistribution bed/mattress (bed type)    Nutrition Interventions: Offer support with meals,snacks and hydration, Document food/fluid/supplement intake    Friction and Shear Interventions: Lift team/patient mobility team                Problem: Patient Education: Go to Patient Education Activity  Goal: Patient/Family Education  Outcome: Progressing Towards Goal

## 2019-08-24 NOTE — CONSULTS
[facility]    Name: Arik Roque      Admitted: 2019  MRN #: [id number]      : 1950  Account #: [de-identified]     Age: 70 y.o. Location:[facility]   Physician: Daniel Barajas MD         REASON FOR ADMISSION:  Active Problems:    ESRD (end stage renal disease) ([address] (2011)      Anemia (2019)        HISTORY OF PRESENT ILLNESS:   Mr Kira Castellon is a 71 y/o F with PMH listed below who presented with low Hbg. He was recently discharged from [facility] after he underwent w/u for GI bleed. He is on HD MWF. Last HD was on Wednesday PTA. Nephrology service was consulted today. PAST MEDICAL HISTORY:   ESRD   GI bleed   Anemia   HTN       MEDICATIONS ON ADMISSION:  Prior to Admission medications    Medication Sig Start Date End Date Taking? Authorizing Provider   umeclidinium-vilanterol (ANORO ELLIPTA) 62.5-25 mcg/actuation inhaler Take 1 Puff by inhalation daily. Yes Provider, Historical   pantoprazole (PROTONIX) 40 mg tablet Take 40 mg by mouth two (2) times a day. Yes Provider, Historical   metoprolol tartrate (LOPRESSOR) 25 mg tablet Take 25 mg by mouth two (2) times a day. Yes Provider, Historical   latanoprost (XALATAN) 0.005 % ophthalmic solution Administer 1 Drop to both eyes nightly. Yes Provider, Historical   aspirin delayed-release 81 mg tablet Take 81 mg by mouth daily. Yes Provider, Historical   b complex-vitamin c-folic acid 0.8 mg (NEPHRO-JM) 0.8 mg tab tablet Take 1 Tab by mouth daily. Yes Provider, Historical   sucralfate (CARAFATE) 1 gram tablet Take 1 Tab by mouth Before breakfast, lunch, dinner and at bedtime for 30 days.  19 Yes Berkley Head MD   PROAIR HFA 90 mcg/actuation inhaler INHALE TWO PUFFS BY MOUTH EVERY 4 HOURS AS NEEDED FOR SHORTNESS OF BREATH 17  Yes Maral Mccord MD       ALLERGIES:   No Known Allergies    SOCIAL HISTORY:   No smoking       FAMILY HISTORY: No kidney disease     REVIEW OF SYSTEMS:   Denies SOB, N/V    No bloody stool     PHYSICAL EXAMINATION:      GENERAL:   He looks ill. He a 70 y.o.  male. VITAL SIGNS:   The patients  weight is 82.9 kg (182 lb 12.2 oz). His temperature is 97.8 °F (36.6 °C). His blood pressure is 143/71 and his pulse is 84. His respiration is 16 and oxygen saturation is 98%. He is afebrile. GENERAL:  NAD, lying comfortably in bed. HEENT:  Normocephalic atraumatic cranium.  Conjunctivae and sclerae are clear.    LUNGS:  No crackles, no wheezes    CARDIAC:  Regular rhythm.  No S3 gallop.  No rub. ABDOMEN:  Soft with normoactive bowel sounds.  No tenderness   EXTREMITIES:   No  LE edema, no cyanosis. RUE  AVF + thrill           LABORATORY DATA:       CBC WITH AUTOMATED DIFF    Collection Time: 08/24/19  1:30 AM   Result Value Ref Range    WBC 9.4 4.1 - 11.1 K/uL    RBC 2.28 (L) 4.10 - 5.70 M/uL    HGB 6.3 (L) 12.1 - 17.0 g/dL    HCT 20.5 (L) 36.6 - 50.3 %    MCV 89.9 80.0 - 99.0 FL    MCH 27.6 26.0 - 34.0 PG    MCHC 30.7 30.0 - 36.5 g/dL    RDW 17.0 (H) 11.5 - 14.5 %    PLATELET 271 853 - 114 K/uL    MPV 10.6 8.9 - 12.9 FL    NRBC 0.0 0  WBC    ABSOLUTE NRBC 0.00 0.00 - 0.01 K/uL    NEUTROPHILS 70 32 - 75 %    LYMPHOCYTES 12 12 - 49 %    MONOCYTES 6 5 - 13 %    EOSINOPHILS 12 (H) 0 - 7 %    BASOPHILS 0 0 - 1 %    IMMATURE GRANULOCYTES 0 0.0 - 0.5 %    ABS. NEUTROPHILS 6.6 1.8 - 8.0 K/UL    ABS. LYMPHOCYTES 1.1 0.8 - 3.5 K/UL    ABS. MONOCYTES 0.6 0.0 - 1.0 K/UL    ABS. EOSINOPHILS 1.1 (H) 0.0 - 0.4 K/UL    ABS. BASOPHILS 0.0 0.0 - 0.1 K/UL    ABS. IMM. GRANS. 0.0 0.00 - 0.04 K/UL    DF SMEAR SCANNED      RBC COMMENTS TEARDROP CELLS  PRESENT        RBC COMMENTS OVALOCYTES  PRESENT        RBC COMMENTS HYPOCHROMIA  1+        RBC COMMENTS ANISOCYTOSIS  1+         Results for Noah Ludwig (MRN [id number]) as of 8/24/2019 09:34   Ref.  Range 8/23/2019 04:53   Sodium Latest Ref Range: 136 - 145 mmol/L 136 Potassium Latest Ref Range: 3.5 - 5.1 mmol/L 3.8   Chloride Latest Ref Range: 97 - 108 mmol/L 101   CO2 Latest Ref Range: 21 - 32 mmol/L 27   Anion gap Latest Ref Range: 5 - 15 mmol/L 8   Glucose Latest Ref Range: 65 - 100 mg/dL 75   BUN Latest Ref Range: 6 - 20 MG/DL 21 (H)   Creatinine Latest Ref Range: 0.70 - 1.30 MG/DL 7.55 (H)   BUN/Creatinine ratio Latest Ref Range: 12 - 20   3 (L)   Calcium Latest Ref Range: 8.5 - 10.1 MG/DL 8.8   GFR est non-AA Latest Ref Range: >60 ml/min/1.73m2 7 (L)   GFR est AA Latest Ref Range: >60 ml/min/1.73m2 9 (L)           IMPRESSION:  ESRD: On HD MWF      Anemia     GI bleed     PLAN:   Pt did not received his regular HD treatment yesterday as Nephrology service was not consulted. Plan for HD today. [facility] acute team was notified.      Dose all meds to ESRD on HD     GI bleed w/u per primary service and GI         Alejandra Hwang MD  8/24/2019

## 2019-08-24 NOTE — PROGRESS NOTES
Bedside and Verbal shift change report given to Becki Magaña RN (oncoming nurse) by Gunnar Fang RN (offgoing nurse). Report included the following information SBAR, Kardex, Procedure Summary, Intake/Output, MAR and Recent Results.

## 2019-08-25 LAB
ABO + RH BLD: NORMAL
BLD PROD TYP BPU: NORMAL
BLD PROD TYP BPU: NORMAL
BLOOD GROUP ANTIBODIES SERPL: NORMAL
BPU ID: NORMAL
BPU ID: NORMAL
CROSSMATCH RESULT,%XM: NORMAL
CROSSMATCH RESULT,%XM: NORMAL
GLUCOSE BLD STRIP.AUTO-MCNC: 142 MG/DL (ref 65–100)
HCT VFR BLD AUTO: 31.8 % (ref 36.6–50.3)
HGB BLD-MCNC: 9.6 G/DL (ref 12.1–17)
SERVICE CMNT-IMP: ABNORMAL
SPECIMEN EXP DATE BLD: NORMAL
STATUS OF UNIT,%ST: NORMAL
STATUS OF UNIT,%ST: NORMAL
UNIT DIVISION, %UDIV: 0
UNIT DIVISION, %UDIV: 0

## 2019-08-25 PROCEDURE — 74011250637 HC RX REV CODE- 250/637: Performed by: NURSE PRACTITIONER

## 2019-08-25 PROCEDURE — 74011250637 HC RX REV CODE- 250/637: Performed by: FAMILY MEDICINE

## 2019-08-25 PROCEDURE — 74011000250 HC RX REV CODE- 250: Performed by: NURSE PRACTITIONER

## 2019-08-25 PROCEDURE — 36415 COLL VENOUS BLD VENIPUNCTURE: CPT

## 2019-08-25 PROCEDURE — 94640 AIRWAY INHALATION TREATMENT: CPT

## 2019-08-25 PROCEDURE — 74011000250 HC RX REV CODE- 250: Performed by: FAMILY MEDICINE

## 2019-08-25 PROCEDURE — 82962 GLUCOSE BLOOD TEST: CPT

## 2019-08-25 PROCEDURE — 65660000000 HC RM CCU STEPDOWN

## 2019-08-25 PROCEDURE — C9113 INJ PANTOPRAZOLE SODIUM, VIA: HCPCS | Performed by: NURSE PRACTITIONER

## 2019-08-25 PROCEDURE — 74011250636 HC RX REV CODE- 250/636: Performed by: NURSE PRACTITIONER

## 2019-08-25 PROCEDURE — 94760 N-INVAS EAR/PLS OXIMETRY 1: CPT

## 2019-08-25 PROCEDURE — 85018 HEMOGLOBIN: CPT

## 2019-08-25 RX ORDER — SODIUM CHLORIDE 0.9 % (FLUSH) 0.9 %
5-40 SYRINGE (ML) INJECTION AS NEEDED
Status: CANCELLED | OUTPATIENT
Start: 2019-08-25

## 2019-08-25 RX ORDER — SODIUM CHLORIDE 0.9 % (FLUSH) 0.9 %
5-40 SYRINGE (ML) INJECTION EVERY 8 HOURS
Status: CANCELLED | OUTPATIENT
Start: 2019-08-25

## 2019-08-25 RX ORDER — MIDAZOLAM HYDROCHLORIDE 1 MG/ML
.25-5 INJECTION, SOLUTION INTRAMUSCULAR; INTRAVENOUS
Status: CANCELLED | OUTPATIENT
Start: 2019-08-25 | End: 2019-08-25

## 2019-08-25 RX ORDER — ATROPINE SULFATE 0.1 MG/ML
0.5 INJECTION INTRAVENOUS
Status: CANCELLED | OUTPATIENT
Start: 2019-08-25 | End: 2019-08-26

## 2019-08-25 RX ORDER — EPINEPHRINE 0.1 MG/ML
1 INJECTION INTRACARDIAC; INTRAVENOUS
Status: CANCELLED | OUTPATIENT
Start: 2019-08-25 | End: 2019-08-26

## 2019-08-25 RX ORDER — DEXTROMETHORPHAN/PSEUDOEPHED 2.5-7.5/.8
1.2 DROPS ORAL
Status: CANCELLED | OUTPATIENT
Start: 2019-08-25

## 2019-08-25 RX ORDER — IPRATROPIUM BROMIDE AND ALBUTEROL SULFATE 2.5; .5 MG/3ML; MG/3ML
3 SOLUTION RESPIRATORY (INHALATION)
Status: DISCONTINUED | OUTPATIENT
Start: 2019-08-25 | End: 2019-08-28 | Stop reason: HOSPADM

## 2019-08-25 RX ORDER — SODIUM CHLORIDE 9 MG/ML
150 INJECTION, SOLUTION INTRAVENOUS CONTINUOUS
Status: CANCELLED | OUTPATIENT
Start: 2019-08-25 | End: 2019-08-25

## 2019-08-25 RX ORDER — FENTANYL CITRATE 50 UG/ML
200 INJECTION, SOLUTION INTRAMUSCULAR; INTRAVENOUS
Status: CANCELLED | OUTPATIENT
Start: 2019-08-25 | End: 2019-08-25

## 2019-08-25 RX ADMIN — Medication 10 ML: at 22:56

## 2019-08-25 RX ADMIN — IPRATROPIUM BROMIDE AND ALBUTEROL SULFATE 3 ML: .5; 3 SOLUTION RESPIRATORY (INHALATION) at 20:31

## 2019-08-25 RX ADMIN — SUCRALFATE 1 G: 1 TABLET ORAL at 06:58

## 2019-08-25 RX ADMIN — SUCRALFATE 1 G: 1 TABLET ORAL at 23:02

## 2019-08-25 RX ADMIN — LATANOPROST 1 DROP: 50 SOLUTION OPHTHALMIC at 23:02

## 2019-08-25 RX ADMIN — IPRATROPIUM BROMIDE AND ALBUTEROL SULFATE 3 ML: .5; 3 SOLUTION RESPIRATORY (INHALATION) at 03:02

## 2019-08-25 RX ADMIN — Medication 10 ML: at 07:00

## 2019-08-25 RX ADMIN — SUCRALFATE 1 G: 1 TABLET ORAL at 12:20

## 2019-08-25 RX ADMIN — SODIUM CHLORIDE 40 MG: 9 INJECTION INTRAMUSCULAR; INTRAVENOUS; SUBCUTANEOUS at 21:00

## 2019-08-25 RX ADMIN — IPRATROPIUM BROMIDE AND ALBUTEROL SULFATE 3 ML: .5; 3 SOLUTION RESPIRATORY (INHALATION) at 14:49

## 2019-08-25 RX ADMIN — SODIUM CHLORIDE 40 MG: 9 INJECTION INTRAMUSCULAR; INTRAVENOUS; SUBCUTANEOUS at 09:09

## 2019-08-25 RX ADMIN — METOPROLOL TARTRATE 25 MG: 25 TABLET ORAL at 09:09

## 2019-08-25 RX ADMIN — ASCORBIC ACID, THIAMINE MONONITRATE,RIBOFLAVIN, NIACINAMIDE, PYRIDOXINE HYDROCHLORIDE, FOLIC ACID, CYANOCOBALAMIN, BIOTIN, CALCIUM PANTOTHENATE, 1 CAPSULE: 100; 1.5; 1.7; 20; 10; 1; 6000; 150000; 5 CAPSULE, LIQUID FILLED ORAL at 09:08

## 2019-08-25 RX ADMIN — METOPROLOL TARTRATE 25 MG: 25 TABLET ORAL at 18:14

## 2019-08-25 RX ADMIN — SUCRALFATE 1 G: 1 TABLET ORAL at 16:26

## 2019-08-25 RX ADMIN — IPRATROPIUM BROMIDE AND ALBUTEROL SULFATE 3 ML: .5; 3 SOLUTION RESPIRATORY (INHALATION) at 08:28

## 2019-08-25 NOTE — PROGRESS NOTES
Bedside and Verbal shift change report given to Maria R Palumbo RN (oncoming nurse) by Karly Quarles RN (offgoing nurse). Report included the following information SBAR, Kardex, Intake/Output, MAR and Recent Results.

## 2019-08-25 NOTE — PROGRESS NOTES
Lucinda Laura, Alta Mohs, and Cynthia Mcbride Date: 8/22/2019      Subjective:     Patient doing OK. Had dialysis yesterday. Also had 1 unit blood given. .       Current Facility-Administered Medications   Medication Dose Route Frequency    B complex-vitaminC-folic acid (NEPHROCAP) cap  1 Cap Oral DAILY    latanoprost (XALATAN) 0.005 % ophthalmic solution 1 Drop  1 Drop Both Eyes QHS    metoprolol tartrate (LOPRESSOR) tablet 25 mg  25 mg Oral BID    albuterol (PROVENTIL VENTOLIN) nebulizer solution 2.5 mg  2.5 mg Nebulization Q4H PRN    albuterol-ipratropium (DUO-NEB) 2.5 MG-0.5 MG/3 ML  3 mL Nebulization Q6H RT    0.9% sodium chloride infusion 250 mL  250 mL IntraVENous PRN    pantoprazole (PROTONIX) 40 mg in sodium chloride 0.9% 10 mL injection  40 mg IntraVENous Q12H    sucralfate (CARAFATE) tablet 1 g  1 g Oral AC&HS    sodium chloride (NS) flush 5-40 mL  5-40 mL IntraVENous Q8H    sodium chloride (NS) flush 5-40 mL  5-40 mL IntraVENous PRN          Objective:     Patient Vitals for the past 8 hrs:   BP Temp Pulse Resp SpO2 Weight   08/25/19 0906 133/71 98 °F (36.7 °C) 77 18 100 % 186 lb 1.1 oz (84.4 kg)   08/25/19 0828 -- -- -- -- 98 % --   08/25/19 0200 131/77 98.1 °F (36.7 °C) 65 18 98 % --     No intake/output data recorded. 08/23 1901 - 08/25 0700  In: 461.7 [I.V.:120]  Out: 1500     Physical Exam: Lungs: clear to auscultation bilaterally  Heart: regular rate and rhythm, S1, S2 normal, no murmur, click, rub or gallop  Abdomen: soft, non-tender. Bowel sounds normal. No masses,  no organomegaly        Data Review No results found for this or any previous visit (from the past 24 hour(s)).         Assessment:     Active Problems:    ESRD (end stage renal disease) ([facility] [address] (7/21/2011)      Anemia (8/22/2019)        Plan:     1) check H&H today  2) for EGD and capsule placement in AM

## 2019-08-25 NOTE — PROGRESS NOTES
Bedside shift change report given to Karen Good (oncoming nurse) by Aleksandar Alfaro (offgoing nurse). Report included the following information SBAR, Kardex, Intake/Output and MAR.

## 2019-08-25 NOTE — PROGRESS NOTES
Problem: Risk for Spread of Infection  Goal: Prevent transmission of infectious organism to others  Description  Prevent the transmission of infectious organisms to other patients, staff members, and visitors. Outcome: Progressing Towards Goal     Problem: Patient Education:  Go to Education Activity  Goal: Patient/Family Education  Outcome: Progressing Towards Goal     Problem: Falls - Risk of  Goal: *Absence of Falls  Description  Document [place] Fall Risk and appropriate interventions in the flowsheet. Outcome: Progressing Towards Goal  Note:   Fall Risk Interventions:  Mobility Interventions: Communicate number of staff needed for ambulation/transfer, Patient to call before getting OOB              Elimination Interventions: Call light in reach, Patient to call for help with toileting needs              Problem: Patient Education: Go to Patient Education Activity  Goal: Patient/Family Education  Outcome: Progressing Towards Goal     Problem: Pressure Injury - Risk of  Goal: *Prevention of pressure injury  Description  Document Ankit Scale and appropriate interventions in the flowsheet.   Outcome: Progressing Towards Goal  Note:   Pressure Injury Interventions:  Sensory Interventions: Float heels, Discuss PT/OT consult with provider    Moisture Interventions: Apply protective barrier, creams and emollients    Activity Interventions: PT/OT evaluation, Pressure redistribution bed/mattress(bed type)    Mobility Interventions: PT/OT evaluation, Pressure redistribution bed/mattress (bed type)    Nutrition Interventions: Offer support with meals,snacks and hydration, Document food/fluid/supplement intake    Friction and Shear Interventions: Lift sheet, Lift team/patient mobility team                Problem: Patient Education: Go to Patient Education Activity  Goal: Patient/Family Education  Outcome: Progressing Towards Goal

## 2019-08-26 PROBLEM — I44.1 SECOND DEGREE HEART BLOCK: Status: ACTIVE | Noted: 2019-08-26

## 2019-08-26 LAB
ERYTHROCYTE [DISTWIDTH] IN BLOOD BY AUTOMATED COUNT: 16.4 % (ref 11.5–14.5)
HCT VFR BLD AUTO: 22.6 % (ref 36.6–50.3)
HGB BLD-MCNC: 7 G/DL (ref 12.1–17)
MCH RBC QN AUTO: 28 PG (ref 26–34)
MCHC RBC AUTO-ENTMCNC: 31 G/DL (ref 30–36.5)
MCV RBC AUTO: 90.4 FL (ref 80–99)
NRBC # BLD: 0 K/UL (ref 0–0.01)
NRBC BLD-RTO: 0 PER 100 WBC
PLATELET # BLD AUTO: 217 K/UL (ref 150–400)
PMV BLD AUTO: 10.2 FL (ref 8.9–12.9)
RBC # BLD AUTO: 2.5 M/UL (ref 4.1–5.7)
WBC # BLD AUTO: 8.6 K/UL (ref 4.1–11.1)

## 2019-08-26 PROCEDURE — 65660000000 HC RM CCU STEPDOWN

## 2019-08-26 PROCEDURE — C9113 INJ PANTOPRAZOLE SODIUM, VIA: HCPCS | Performed by: NURSE PRACTITIONER

## 2019-08-26 PROCEDURE — 85027 COMPLETE CBC AUTOMATED: CPT

## 2019-08-26 PROCEDURE — 74011250637 HC RX REV CODE- 250/637: Performed by: FAMILY MEDICINE

## 2019-08-26 PROCEDURE — 36415 COLL VENOUS BLD VENIPUNCTURE: CPT

## 2019-08-26 PROCEDURE — 74011250637 HC RX REV CODE- 250/637: Performed by: NURSE PRACTITIONER

## 2019-08-26 PROCEDURE — 74011000250 HC RX REV CODE- 250: Performed by: NURSE PRACTITIONER

## 2019-08-26 PROCEDURE — 90935 HEMODIALYSIS ONE EVALUATION: CPT

## 2019-08-26 PROCEDURE — 93005 ELECTROCARDIOGRAM TRACING: CPT

## 2019-08-26 PROCEDURE — 74011250636 HC RX REV CODE- 250/636: Performed by: NURSE PRACTITIONER

## 2019-08-26 RX ORDER — ACETAMINOPHEN 325 MG/1
650 TABLET ORAL
Status: DISCONTINUED | OUTPATIENT
Start: 2019-08-26 | End: 2019-08-28 | Stop reason: HOSPADM

## 2019-08-26 RX ADMIN — ACETAMINOPHEN 650 MG: 325 TABLET, FILM COATED ORAL at 13:09

## 2019-08-26 RX ADMIN — SUCRALFATE 1 G: 1 TABLET ORAL at 07:55

## 2019-08-26 RX ADMIN — Medication 10 ML: at 21:42

## 2019-08-26 RX ADMIN — SUCRALFATE 1 G: 1 TABLET ORAL at 21:42

## 2019-08-26 RX ADMIN — SODIUM CHLORIDE 40 MG: 9 INJECTION INTRAMUSCULAR; INTRAVENOUS; SUBCUTANEOUS at 21:42

## 2019-08-26 RX ADMIN — SODIUM CHLORIDE 40 MG: 9 INJECTION INTRAMUSCULAR; INTRAVENOUS; SUBCUTANEOUS at 09:15

## 2019-08-26 RX ADMIN — Medication 10 ML: at 09:15

## 2019-08-26 RX ADMIN — SUCRALFATE 1 G: 1 TABLET ORAL at 13:09

## 2019-08-26 RX ADMIN — SUCRALFATE 1 G: 1 TABLET ORAL at 16:42

## 2019-08-26 RX ADMIN — ASCORBIC ACID, THIAMINE MONONITRATE,RIBOFLAVIN, NIACINAMIDE, PYRIDOXINE HYDROCHLORIDE, FOLIC ACID, CYANOCOBALAMIN, BIOTIN, CALCIUM PANTOTHENATE, 1 CAPSULE: 100; 1.5; 1.7; 20; 10; 1; 6000; 150000; 5 CAPSULE, LIQUID FILLED ORAL at 09:15

## 2019-08-26 RX ADMIN — Medication 10 ML: at 06:00

## 2019-08-26 RX ADMIN — METOPROLOL TARTRATE 25 MG: 25 TABLET ORAL at 18:12

## 2019-08-26 RX ADMIN — LATANOPROST 1 DROP: 50 SOLUTION OPHTHALMIC at 21:43

## 2019-08-26 NOTE — PROGRESS NOTES
Problem: Risk for Spread of Infection  Goal: Prevent transmission of infectious organism to others  Description  Prevent the transmission of infectious organisms to other patients, staff members, and visitors. Outcome: Progressing Towards Goal     Problem: Patient Education:  Go to Education Activity  Goal: Patient/Family Education  Outcome: Progressing Towards Goal     Problem: Falls - Risk of  Goal: *Absence of Falls  Description  Document Jim Yang Fall Risk and appropriate interventions in the flowsheet. Outcome: Progressing Towards Goal  Note:   Fall Risk Interventions:  Mobility Interventions: Communicate number of staff needed for ambulation/transfer, Patient to call before getting OOB              Elimination Interventions: Call light in reach, Patient to call for help with toileting needs              Problem: Patient Education: Go to Patient Education Activity  Goal: Patient/Family Education  Outcome: Progressing Towards Goal     Problem: Pressure Injury - Risk of  Goal: *Prevention of pressure injury  Description  Document Ankit Scale and appropriate interventions in the flowsheet.   Outcome: Progressing Towards Goal  Note:   Pressure Injury Interventions:  Sensory Interventions: Keep linens dry and wrinkle-free, Discuss PT/OT consult with provider    Moisture Interventions: Apply protective barrier, creams and emollients    Activity Interventions: PT/OT evaluation, Pressure redistribution bed/mattress(bed type), Increase time out of bed    Mobility Interventions: PT/OT evaluation, Pressure redistribution bed/mattress (bed type)    Nutrition Interventions: Offer support with meals,snacks and hydration, Document food/fluid/supplement intake    Friction and Shear Interventions: Minimize layers                Problem: Patient Education: Go to Patient Education Activity  Goal: Patient/Family Education  Outcome: Progressing Towards Goal

## 2019-08-26 NOTE — PROGRESS NOTES
Patrice Ibarra [address] M.D.  [phone]               GASTROENTEROLOGY PROGRESS NOTE        NAME: Emily Scott   :  1950   MRN:  [id number]       Subjective:   No hematemesis, melena, or hematochezia. EGD cancelled this AM due to 2nd degree heart block. Cardiology with plans for pacemaker. Objective:   VITALS:   Last 24hrs VS reviewed. Most recent are:  Visit Vitals  /58   Pulse 72   Temp 98.5 °F (36.9 °C) (Oral)   Resp 18   Wt 78.1 kg (172 lb 2.9 oz)   SpO2 100%   BMI 26.18 kg/m²     No intake or output data in the 24 hours ending 19 1712    PHYSICAL EXAM:  General: Alert, in no acute distress    Lungs:            CTA Bilaterally  Heart:  Normal S1, S2    Abdomen: Soft, Non distended, Non tender. Normoactive bowel sounds, no HSM,   no rebound/guarding  MSK:   Normal muscle tone  Psych:   Not anxious nor agitated. Lab Data Reviewed:   Recent Labs     19  0453 19  1004 19  0130   WBC 8.6  --  9.4   HGB 7.0* 9.6* 6.3*   HCT 22.6* 31.8* 20.5*     --  247     No results for input(s): NA, K, CL, CO2, BUN, CREA, GLU, CA, MG, PHOS in the last 72 hours. No results for input(s): SGOT, GPT, AP, TBIL, TP, ALB, GLOB, GGT, AML, LPSE in the last 72 hours. No lab exists for component: AMYP, HLPSE  No results for input(s): INR, PTP, APTT in the last 72 hours. No lab exists for component: INREXT   No results for input(s): FE, TIBC, PSAT, FERR in the last 72 hours. No results for input(s): CPK, CKMB in the last 72 hours. No lab exists for component: HARPAL    See Electronic Medical Record for all procedure/radiology reports and details which were not copied into this note but were reviewed prior to the creation of the Plan. Assessment:      · Anemia: midsmall bowel bleeding ulcer on M2A on 7/14/19 - M2A  capsule never reached duodenum. No signs of active GIB.   · ESRD: nephrology consulted, on HD and EPO  · On Plavix PTA - last dose 8/21  · 2nd degree heart block-plan for pacemaker though pt seems tentative           Patient Active Problem List   Diagnosis Code    Kidney disease N28.9    ESRD (end stage renal disease) ([facility] [address] N18.6    Toe amputation status ([facility] [address] T61.184T    Type 2 diabetes, diet controlled ([facility] [address] E11.9    ESRD on dialysis ([facility] [address] N18.6, Z99.2    Dialysis patient ([facility] [address] Z99.2    Other specified anemias D64.89    Severe anemia D64.9    Heme positive stool R19.5    Peripheral vascular disease (HCC) I73.9    S/P transmetatarsal amputation of foot, right (HCC) Z89.431    GI bleed K92.2    Anemia D64.9      Plan:      · PPI/Carafate  · Trend CBC, transfuse as necessary  · EGD with capsule placement once cleared by Cardiology         Signed by:  Jared Valdivia MD         8/26/2019  5:12 PM

## 2019-08-26 NOTE — PROGRESS NOTES
[facility]       Renal Daily Progress Note:     Admission Date: 2019     Subjective:  - \"I feel fine\". EGD cancelled today because of 2nd degree heart block     Review of Systems  No new complaints were offered     Objective:     Visit Vitals  /90 (BP 1 Location: Left arm, BP Patient Position: At rest)   Pulse 87   Temp 98.1 °F (36.7 °C)   Resp 16   Wt 78.1 kg (172 lb 2.9 oz)   SpO2 98%   BMI 26.18 kg/m²     Temp (24hrs), Av.2 °F (36.8 °C), Min:98 °F (36.7 °C), Max:98.3 °F (36.8 °C)      No intake or output data in the 24 hours ending 19 1025  Current Facility-Administered Medications   Medication Dose Route Frequency    albuterol-ipratropium (DUO-NEB) 2.5 MG-0.5 MG/3 ML  3 mL Nebulization Q6H PRN    B complex-vitaminC-folic acid (NEPHROCAP) cap  1 Cap Oral DAILY    latanoprost (XALATAN) 0.005 % ophthalmic solution 1 Drop  1 Drop Both Eyes QHS    metoprolol tartrate (LOPRESSOR) tablet 25 mg  25 mg Oral BID    albuterol (PROVENTIL VENTOLIN) nebulizer solution 2.5 mg  2.5 mg Nebulization Q4H PRN    0.9% sodium chloride infusion 250 mL  250 mL IntraVENous PRN    pantoprazole (PROTONIX) 40 mg in sodium chloride 0.9% 10 mL injection  40 mg IntraVENous Q12H    sucralfate (CARAFATE) tablet 1 g  1 g Oral AC&HS    sodium chloride (NS) flush 5-40 mL  5-40 mL IntraVENous Q8H    sodium chloride (NS) flush 5-40 mL  5-40 mL IntraVENous PRN       Physical Exam:  GENERAL:  NAD, lying comfortably in bed. HEENT:  Normocephalic atraumatic cranium.  Conjunctivae and sclerae are clear.    LUNGS:  No crackles, no wheezes    CARDIAC:  Regular rhythm.  No S3 gallop.  No rub. ABDOMEN:  Soft with normoactive bowel sounds.  No tenderness   EXTREMITIES:   No  LE edema, no cyanosis. RUE  AVF + thrill         Data Review:     LABS:  No results for input(s): NA, K, CL, CO2, BUN, CREA, CA, ALB, PHOS, MG in the last 72 hours.   Recent Labs     19  0453 19  1004 19  0130   WBC 8.6  --  9.4 HGB 7.0* 9.6* 6.3*   HCT 22.6* 31.8* 20.5*     --  247     No results for input(s): SKYLER, KU, CLU, CREDOROTHY in the last 72 hours.     No lab exists for component: PROU    Assessment:   Renal Specific Problems    ESRD on HD     Anemia     GI bleed       Plan:     Obtain/ Order: labs/cultures/radiology/procedures:      Therapeutic:    HD later today per HD schedule MWF     Dose all meds to ESRD on HD       Dustin Boyle MD

## 2019-08-26 NOTE — CONSULTS
Cardiology Consult Note    CC: arrhythmia  Reason for consult:  Mobitz Type II AVB  Requesting MD:  Dr. Cecilio Castillo     Subjective:      Date of  Admission: 8/22/2019  4:55 PM     Admission type:Emergency    Sarah Marrero is a 70 y.o. male admitted for Anemia [D64.9]  ESRD (end stage renal disease) ([facility] [N18.6]. Patient complains of weakness and some SOB. Initinally when he came in hs stated that he had no sx. He was found to be anemic and was admitted to monitor especially in light of recent GIB. He was found to have arrhythmia on monitoring. My review of the arrhythmia reveals Mobitz type II AVB in setting of chronic first degree AVB. The only regimen that can affect HR is his BB, which he needs to keep hist HR and BPs down. He denies any CP or palpitation or dizziness. He has ESRD and receives HD but his electrolytes were fine overnight.     Patient Active Problem List    Diagnosis Date Noted    Second degree heart block 08/26/2019    Anemia 08/22/2019    GI bleed 08/14/2019    S/P transmetatarsal amputation of foot, right ([facility]     [facility] 07/26/2017    Heme positive stool 04/01/2017    Other specified anemias 03/31/2017    Severe anemia 03/31/2017    Dialysis patient ([facility] 10/11/2016    Toe amputation status ([facility] 08/23/2016    Type 2 diabetes, diet controlled ([facility] 08/23/2016    ESRD on dialysis ([facility] 08/23/2016    Kidney disease 07/21/2011    ESRD (end stage renal disease) ([facility] 07/21/2011      Other, MD Wilfredo  Past Medical History:   Diagnosis Date    Arthritis     GOUT    Cancer ([facility] 2015    PROSTATE    Chronic kidney disease     KIDNEY FAILURE    Dialysis patient ([address] 10/11/2016    ESRD on dialysis ([facility] 8/23/2016    Heme positive stool 4/1/2017    3/31/17    HTN (hypertension) 7/21/2011    Kidney disease 7/21/2011    Peripheral vascular disease ([address] 7/26/2017    S/P transmetatarsal amputation of foot, right ([facility]     Toe amputation status ([facility] 8/23/2016  Type II or unspecified type diabetes mellitus without mention of complication, not stated as uncontrolled     PATIENT DENIES      Past Surgical History:   Procedure Laterality Date    HX PROSTATECTOMY  OCT 2015    BIOPSY ONLY    VASCULAR SURGERY PROCEDURE UNLIST  6/21/11    UPPER R CHEST DIALYSIS ACCESS    VASCULAR SURGERY PROCEDURE UNLIST  2011    SHUNT RIGHT FOREARM FOR DIALYSIS CHEST ACCESS REMOVED     No Known Allergies   Family History   Problem Relation Age of Onset    Cancer Mother         LUNG AND COLON    Kidney Disease Mother     Hypertension Father     Hypertension Sister     Diabetes Brother     Kidney Disease Brother     Diabetes Sister     Diabetes Sister     Anesth Problems Neg Hx       Current Facility-Administered Medications   Medication Dose Route Frequency    acetaminophen (TYLENOL) tablet 650 mg  650 mg Oral Q6H PRN    albuterol-ipratropium (DUO-NEB) 2.5 MG-0.5 MG/3 ML  3 mL Nebulization Q6H PRN    B complex-vitaminC-folic acid (NEPHROCAP) cap  1 Cap Oral DAILY    latanoprost (XALATAN) 0.005 % ophthalmic solution 1 Drop  1 Drop Both Eyes QHS    metoprolol tartrate (LOPRESSOR) tablet 25 mg  25 mg Oral BID    albuterol (PROVENTIL VENTOLIN) nebulizer solution 2.5 mg  2.5 mg Nebulization Q4H PRN    0.9% sodium chloride infusion 250 mL  250 mL IntraVENous PRN    pantoprazole (PROTONIX) 40 mg in sodium chloride 0.9% 10 mL injection  40 mg IntraVENous Q12H    sucralfate (CARAFATE) tablet 1 g  1 g Oral AC&HS    sodium chloride (NS) flush 5-40 mL  5-40 mL IntraVENous Q8H    sodium chloride (NS) flush 5-40 mL  5-40 mL IntraVENous PRN        Prior to Admission Medications:  Prior to Admission medications    Medication Sig Start Date End Date Taking? Authorizing Provider   umeclidinium-vilanterol (ANORO ELLIPTA) 62.5-25 mcg/actuation inhaler Take 1 Puff by inhalation daily. Yes Provider, Historical   pantoprazole (PROTONIX) 40 mg tablet Take 40 mg by mouth two (2) times a day. Yes Provider, Historical   metoprolol tartrate (LOPRESSOR) 25 mg tablet Take 25 mg by mouth two (2) times a day. Yes Provider, Historical   latanoprost (XALATAN) 0.005 % ophthalmic solution Administer 1 Drop to both eyes nightly. Yes Provider, Historical   aspirin delayed-release 81 mg tablet Take 81 mg by mouth daily. Yes Provider, Historical   b complex-vitamin c-folic acid 0.8 mg (NEPHRO-JM) 0.8 mg tab tablet Take 1 Tab by mouth daily. Yes Provider, Historical   sucralfate (CARAFATE) 1 gram tablet Take 1 Tab by mouth Before breakfast, lunch, dinner and at bedtime for 30 days. 19 Yes Kristina Carpenter MD   PROAIR HFA 90 mcg/actuation inhaler INHALE TWO PUFFS BY MOUTH EVERY 4 HOURS AS NEEDED FOR SHORTNESS OF BREATH 17  Yes Isaias Vega MD        Review of Symptoms:  Except as noted in HPI, patient denies recent fever or chills, nausea, vomiting, diarrhea, hemoptysis, hematemesis, dysuria, myalgias, focal neurologic symptoms, ecchymosis, angioedema, odynophagia, dysphagia, sore throat, earache,rash, melena, hematochezia, depression, GERD, cold intolerance, petechia, bleeding gums, or significant weight loss. Review of systems not obtained due to patient factors.      Subjective:    24 hr VS reviewed, overall VSSAF  Temp (24hrs), Av.2 °F (36.8 °C), Min:98 °F (36.7 °C), Max:98.3 °F (36.8 °C)    Patient Vitals for the past 8 hrs:   Pulse   19 0913 87    Patient Vitals for the past 8 hrs:   Resp   19 0913 16    Patient Vitals for the past 8 hrs:   BP   19 0913 159/90        No intake or output data in the 24 hours ending 19 1257      Physical Exam (complete single organ system exam)    Visit Vitals  /90 (BP 1 Location: Left arm, BP Patient Position: At rest)   Pulse 87   Temp 98.1 °F (36.7 °C)   Resp 16   Wt 172 lb 2.9 oz (78.1 kg)   SpO2 98%   BMI 26.18 kg/m²     General Appearance:  Well developed, well nourished,alert and oriented x 3, and individual in no acute distress. Ears/Nose/Mouth/Throat:   Hearing grossly normal.         Neck: Supple. Chest:   Lungs clear to auscultation bilaterally. Cardiovascular:  Regular rate and rhythm, S1, S2 normal, no murmur. Abdomen:   Soft, non-tender, bowel sounds are active. Extremities: No edema bilaterally. Skin: Warm and dry.                Cardiographics    Telemetry: normal sinus rhythm, first degree AVB and intermittent Mobitz type II AVB  ECG: sinus with first degree AVB  Echocardiogram: Not done    Labs:   Recent Results (from the past 24 hour(s))   CBC W/O DIFF    Collection Time: 08/26/19  4:53 AM   Result Value Ref Range    WBC 8.6 4.1 - 11.1 K/uL    RBC 2.50 (L) 4.10 - 5.70 M/uL    HGB 7.0 (L) 12.1 - 17.0 g/dL    HCT 22.6 (L) 36.6 - 50.3 %    MCV 90.4 80.0 - 99.0 FL    MCH 28.0 26.0 - 34.0 PG    MCHC 31.0 30.0 - 36.5 g/dL    RDW 16.4 (H) 11.5 - 14.5 %    PLATELET 853 361 - 048 K/uL    MPV 10.2 8.9 - 12.9 FL    NRBC 0.0 0  WBC    ABSOLUTE NRBC 0.00 0.00 - 0.01 K/uL   EKG, 12 LEAD, INITIAL    Collection Time: 08/26/19  7:50 AM   Result Value Ref Range    Ventricular Rate 86 BPM    Atrial Rate 86 BPM    P-R Interval 298 ms    QRS Duration 80 ms    Q-T Interval 366 ms    QTC Calculation (Bezet) 437 ms    Calculated P Axis 39 degrees    Calculated R Axis -2 degrees    Calculated T Axis 57 degrees    Diagnosis       Sinus rhythm with 1st degree AV block  Otherwise normal ECG  When compared with ECG of 22-AUG-2019 20:21,  Nonspecific T wave abnormality no longer evident in Lateral leads          Assessment:     Assessment:   Intermittent Mobitz type II AVB in setting of  Chronic fist degree AVB; consistent with advanced AVB  ESRD  Anemia; not certain this time any GIB; renal to ESRD  HTN  PVD; s/p toe amputations      Plan:   I believe he is symptomatic from this intermittent AVBs with bradycardia  I recommend a permanent pacer for his protection    Kannan Muniz MD

## 2019-08-26 NOTE — PROGRESS NOTES
Bedside and Verbal shift change report given to Jimenez Collado RN (oncoming nurse) by Kerry Guajardo RN (offgoing nurse). Report included the following information SBAR, Kardex, Procedure Summary, Intake/Output, MAR, Recent Results and Cardiac Rhythm 2nd degree heart block.

## 2019-08-26 NOTE — DIALYSIS
[facility] Dialysis Team [facility]  [phone]    Vitals   Pre   Post   Assessment   Pre   Post     Temp  Temp: 98.5 °F (36.9 °C) (08/26/19 1407)  99.1 LOC  Alert and oriented  Alert and oriented   HR   Pulse (Heart Rate): 69 (08/26/19 1407) 76 Lungs   Unlabored at rest   clear, unlabored at rest    B/P   BP: 159/73 (08/26/19 1407) 116/67 Cardiac   Regular   regular    Resp   Resp Rate: 16 (08/26/19 1407) 18 Skin   Warm and dry   warm and dry    Pain level  Pain Intensity 1: 0 (08/26/19 0913) 0 Edema  None detected      None detected    Orders:    Duration:   Start:    1407 End:    1737 Total:   3.5   Dialyzer:   Dialyzer/Set Up Inspection: Revaclear (08/26/19 1407)   K Bath:   Dialysate K (mEq/L): 3 (08/26/19 1407)   Ca Bath:   Dialysate CA (mEq/L): 2.5 (08/26/19 1407)   Na/Bicarb:   Dialysate NA (mEq/L): 140 (08/26/19 1407)   Target Fluid Removal:   Goal/Amount of Fluid to Remove (mL): 1500 mL (08/26/19 1407)   Access     Type & Location:   Right lower arm graft, site without redness or drainage, prepped with alcohol, cannulated, secured with tape, flushed easily    Labs     Obtained/Reviewed   Critical Results Called   Date when labs were drawn-  Hgb-    HGB   Date Value Ref Range Status   08/26/2019 7.0 (L) 12.1 - 17.0 g/dL Final     Comment:     INVESTIGATED PER DELTA CHECK PROTOCOL     K-    Potassium   Date Value Ref Range Status   08/23/2019 3.8 3.5 - 5.1 mmol/L Final     Ca-   Calcium   Date Value Ref Range Status   08/23/2019 8.8 8.5 - 10.1 MG/DL Final     Bun-   BUN   Date Value Ref Range Status   08/23/2019 21 (H) 6 - 20 MG/DL Final     Creat-   Creatinine   Date Value Ref Range Status   08/23/2019 7.55 (H) 0.70 - 1.30 MG/DL Final        Medications/ Blood Products Given     Name   Dose   Route and Time           none          Blood Volume Processed (BVP):    76 Net Fluid   Removed:  1500   Comments   Time Out Done: 1400  Primary Nurse Rpt Pre: Mona Gomez RN  Primary Nurse Rpt Post: Kathrine Kingsley Luzma Newton, RN   Pt Education:  infection control remove dressing 12-24 hours after dialysis   Care Plan: continue current HD plan of care   Tx Summary:  7762 HD initiated as ordered. All dialysis related medications have been reviewed. 1700 GI in to see patient. 1740 all possible blood returned, hemostasis achieved <20 minutes, dressing dry and intact. SBAR to primary nurse. Admiting Diagnosis: anemia  Pt's previous clinic- [facility], [place]  Consent signed - Informed Consent Verified: Yes (08/26/19 1407)  Moniqueita Consent - verified   Hepatitis Status- Ag negative 07/10/19  Ab immune 07/10/19 patient's clinic   Machine #- Machine Number: [id number] (08/26/19 1407)  Telemetry status-monitored remotely   Pre-dialysis wt. -  n/a

## 2019-08-26 NOTE — PROGRESS NOTES
1122:  Called office to reach Dr. Florencio Ontiveros. Per office staff member he would be in at noon and to call back then to reach him. 1225:  Spoke to Dr. Florencio Ontiveros to notify that dialysis nurse had requested for metoprolol to be held prior to dialysis. Per Dr. Florencio Ontiveros metoprolol dose could be held. Will continue to monitor patient. 1251:  Spoke to Dr. Florencio Ontiveros to see if patient was able to eat due to note stating EGD was cancelled. Per Dr. Florencio Ontiveros patient to be placed back on GI lite diet and for PT to be consulted for patient. Orders placed. Notified Dr. Florencio Ontiveros of patient's complaint of a headache. Per Dr. Florencio Ontiveros place order for 650 mg PO tylenol every 6 hours as needed. Order placed. Will continue to monitor patient. Spoke to patient's family members [facility] and patient's daughter on phone (with permission of patient). RN gave them updates on patient. RN called Dr. Florencio Ontiveros per request of [facility] to see if he could call to speak with her. Dr. Florencio Ontiveros stated he would call her. Will continue to monitor patient. 2015:  Bedside shift change report given to Amber Sosa RN (oncoming nurse) by Lavon An RN (offgoing nurse). Report included the following information SBAR, Kardex, Intake/Output, MAR and Recent Results.

## 2019-08-26 NOTE — PROGRESS NOTES
Drs. Sherrod Libman, Dariana Corral, and Avel Ly Date: 8/22/2019      Subjective:     Patient has gone into a 2nd degree heart block according to tele. He is asymptomatic. .       Current Facility-Administered Medications   Medication Dose Route Frequency    albuterol-ipratropium (DUO-NEB) 2.5 MG-0.5 MG/3 ML  3 mL Nebulization Q6H PRN    B complex-vitaminC-folic acid (NEPHROCAP) cap  1 Cap Oral DAILY    latanoprost (XALATAN) 0.005 % ophthalmic solution 1 Drop  1 Drop Both Eyes QHS    metoprolol tartrate (LOPRESSOR) tablet 25 mg  25 mg Oral BID    albuterol (PROVENTIL VENTOLIN) nebulizer solution 2.5 mg  2.5 mg Nebulization Q4H PRN    0.9% sodium chloride infusion 250 mL  250 mL IntraVENous PRN    pantoprazole (PROTONIX) 40 mg in sodium chloride 0.9% 10 mL injection  40 mg IntraVENous Q12H    sucralfate (CARAFATE) tablet 1 g  1 g Oral AC&HS    sodium chloride (NS) flush 5-40 mL  5-40 mL IntraVENous Q8H    sodium chloride (NS) flush 5-40 mL  5-40 mL IntraVENous PRN          Objective:     Patient Vitals for the past 8 hrs:   BP Temp Pulse Resp SpO2 Weight   08/26/19 0400 -- -- -- -- -- 172 lb 2.9 oz (78.1 kg)   08/26/19 0200 161/76 98 °F (36.7 °C) 90 16 98 % --     No intake/output data recorded. No intake/output data recorded. Physical Exam: Lungs: clear to auscultation bilaterally  Heart: regular rate and rhythm, S1, S2 normal, no murmur, click, rub or gallop  Abdomen: soft, non-tender.  Bowel sounds normal. No masses,  no organomegaly        Data Review   Recent Results (from the past 24 hour(s))   HGB & HCT    Collection Time: 08/25/19 10:04 AM   Result Value Ref Range    HGB 9.6 (L) 12.1 - 17.0 g/dL    HCT 31.8 (L) 36.6 - 50.3 %   GLUCOSE, POC    Collection Time: 08/25/19 11:24 AM   Result Value Ref Range    Glucose (POC) 142 (H) 65 - 100 mg/dL    Performed by Cory Yoonr    CBC W/O DIFF    Collection Time: 08/26/19  4:53 AM   Result Value Ref Range    WBC 8.6 4.1 - 11.1 K/uL RBC 2.50 (L) 4.10 - 5.70 M/uL    HGB 7.0 (L) 12.1 - 17.0 g/dL    HCT 22.6 (L) 36.6 - 50.3 %    MCV 90.4 80.0 - 99.0 FL    MCH 28.0 26.0 - 34.0 PG    MCHC 31.0 30.0 - 36.5 g/dL    RDW 16.4 (H) 11.5 - 14.5 %    PLATELET 786 000 - 913 K/uL    MPV 10.2 8.9 - 12.9 FL    NRBC 0.0 0  WBC    ABSOLUTE NRBC 0.00 0.00 - 0.01 K/uL           Assessment:     Active Problems:    ESRD (end stage renal disease) ([facility] (7/21/2011)      Anemia (8/22/2019)        Plan:     1) EKG  2) Cardiology to see first thing  3) Delay GI procedure until cleared by Cardiology  4) closest relative- Floyd Mai - [phone].

## 2019-08-26 NOTE — PROGRESS NOTES
0500:  RN notified by remote telemetry that patient going in and out of 2nd degree heart block (Wenckebach) with VS: /76, HR 90, RR 16, O2Sat 98%. RN notified Dr. Carrol Capellan about patient's status. Cardiology consult entered; EGD with capsule delayed until cardiology sees him.    0500: dialysis RN called to schedule HD; plan to come around 12-1pm for HD.

## 2019-08-26 NOTE — PROGRESS NOTES
ADULT PROTOCOL: JET AEROSOL  REASSESSMENT    Patient  Jos Larson     70 y.o.   male     8/25/2019  10:13 PM    Breath Sounds Pre Procedure: Right Breath Sounds: Diminished                               Left Breath Sounds: Diminished    Breath Sounds Post Procedure: Right Breath Sounds: Diminished                                 Left Breath Sounds: Diminished    Breathing pattern: Pre procedure Breathing Pattern: Regular          Post procedure Breathing Pattern: Regular    Heart Rate: Pre procedure Pulse: 68           Post procedure Pulse: 58    Resp Rate: Pre procedure Respirations: 18           Post procedure Respirations: 18      Incentive Spirometry:  Actual Volume (ml): 500 ml          Cough: Pre procedure Cough: Non-productive               Post procedure Cough: Non-productive    Suctioned: {    Sputum: Pre procedure                   Post procedure Sputum amount: Other (comment)  Sputum color/odor: (swallowed)    Oxygen: O2 Device: Room air        Changed:     SpO2: Pre procedure SpO2: 99 %                 Post procedure SpO2: 100 %      Nebulizer Therapy: Current medications Aerosolized Medications: DuoNeb every 6 hours      Changed: Duoneb every 6 hours as needed    Smoking History: yes    Problem List:   Patient Active Problem List   Diagnosis Code    Kidney disease N28.9    ESRD (end stage renal disease) ([facility] N18.6    [facility] K47.679X    Type 2 diabetes, diet controlled ([facility] E11.9    ESRD on dialysis ([facility] N18.6, Z99.2    Dialysis patient ([facility] Z99.2    Other specified anemias D64.89    Severe anemia D64.9    Heme positive stool R19.5    Peripheral vascular disease ([facility] I73.9    S/P transmetatarsal amputation of foot, right ([facility] Z89.431    GI bleed K92.2    Anemia D64.9       Respiratory Therapist: Wei Figueroa, RT

## 2019-08-26 NOTE — PROGRESS NOTES
GI note:    EGD cancelled this AM b/c of new 2nd degree heart block. EGD once cleared by Cardiology. Consult pending.     Dr. Liseth Carrion

## 2019-08-27 LAB
ATRIAL RATE: 86 BPM
CALCULATED P AXIS, ECG09: 39 DEGREES
CALCULATED R AXIS, ECG10: -2 DEGREES
CALCULATED T AXIS, ECG11: 57 DEGREES
DIAGNOSIS, 93000: NORMAL
HCT VFR BLD AUTO: 23 % (ref 36.6–50.3)
HGB BLD-MCNC: 6.9 G/DL (ref 12.1–17)
P-R INTERVAL, ECG05: 298 MS
Q-T INTERVAL, ECG07: 366 MS
QRS DURATION, ECG06: 80 MS
QTC CALCULATION (BEZET), ECG08: 437 MS
VENTRICULAR RATE, ECG03: 86 BPM

## 2019-08-27 PROCEDURE — 74011000250 HC RX REV CODE- 250: Performed by: NURSE PRACTITIONER

## 2019-08-27 PROCEDURE — 36415 COLL VENOUS BLD VENIPUNCTURE: CPT

## 2019-08-27 PROCEDURE — 97116 GAIT TRAINING THERAPY: CPT

## 2019-08-27 PROCEDURE — C9113 INJ PANTOPRAZOLE SODIUM, VIA: HCPCS | Performed by: NURSE PRACTITIONER

## 2019-08-27 PROCEDURE — 74011250636 HC RX REV CODE- 250/636: Performed by: NURSE PRACTITIONER

## 2019-08-27 PROCEDURE — 85018 HEMOGLOBIN: CPT

## 2019-08-27 PROCEDURE — 74011250637 HC RX REV CODE- 250/637: Performed by: FAMILY MEDICINE

## 2019-08-27 PROCEDURE — 74011250637 HC RX REV CODE- 250/637: Performed by: NURSE PRACTITIONER

## 2019-08-27 PROCEDURE — 65660000000 HC RM CCU STEPDOWN

## 2019-08-27 PROCEDURE — 97161 PT EVAL LOW COMPLEX 20 MIN: CPT

## 2019-08-27 RX ADMIN — Medication 10 ML: at 21:25

## 2019-08-27 RX ADMIN — SODIUM CHLORIDE 40 MG: 9 INJECTION INTRAMUSCULAR; INTRAVENOUS; SUBCUTANEOUS at 10:36

## 2019-08-27 RX ADMIN — SUCRALFATE 1 G: 1 TABLET ORAL at 07:11

## 2019-08-27 RX ADMIN — METOPROLOL TARTRATE 25 MG: 25 TABLET ORAL at 10:36

## 2019-08-27 RX ADMIN — Medication 10 ML: at 15:00

## 2019-08-27 RX ADMIN — Medication 10 ML: at 07:11

## 2019-08-27 RX ADMIN — METOPROLOL TARTRATE 25 MG: 25 TABLET ORAL at 18:48

## 2019-08-27 RX ADMIN — SODIUM CHLORIDE 40 MG: 9 INJECTION INTRAMUSCULAR; INTRAVENOUS; SUBCUTANEOUS at 21:24

## 2019-08-27 RX ADMIN — SUCRALFATE 1 G: 1 TABLET ORAL at 18:48

## 2019-08-27 RX ADMIN — SUCRALFATE 1 G: 1 TABLET ORAL at 21:28

## 2019-08-27 RX ADMIN — SUCRALFATE 1 G: 1 TABLET ORAL at 12:03

## 2019-08-27 RX ADMIN — LATANOPROST 1 DROP: 50 SOLUTION OPHTHALMIC at 21:24

## 2019-08-27 RX ADMIN — ASCORBIC ACID, THIAMINE MONONITRATE,RIBOFLAVIN, NIACINAMIDE, PYRIDOXINE HYDROCHLORIDE, FOLIC ACID, CYANOCOBALAMIN, BIOTIN, CALCIUM PANTOTHENATE, 1 CAPSULE: 100; 1.5; 1.7; 20; 10; 1; 6000; 150000; 5 CAPSULE, LIQUID FILLED ORAL at 10:36

## 2019-08-27 NOTE — PROGRESS NOTES
Lucinda Farooq, Dariana Joya, and Glenna Pineda Date: 8/22/2019      Subjective:     Patient resistant to having a pacemaker put in . Is also does not want to go to [facility] either. Tried discussing the reasons but he seems uninterested in the risks. .       Current Facility-Administered Medications   Medication Dose Route Frequency    acetaminophen (TYLENOL) tablet 650 mg  650 mg Oral Q6H PRN    albuterol-ipratropium (DUO-NEB) 2.5 MG-0.5 MG/3 ML  3 mL Nebulization Q6H PRN    B complex-vitaminC-folic acid (NEPHROCAP) cap  1 Cap Oral DAILY    latanoprost (XALATAN) 0.005 % ophthalmic solution 1 Drop  1 Drop Both Eyes QHS    metoprolol tartrate (LOPRESSOR) tablet 25 mg  25 mg Oral BID    albuterol (PROVENTIL VENTOLIN) nebulizer solution 2.5 mg  2.5 mg Nebulization Q4H PRN    0.9% sodium chloride infusion 250 mL  250 mL IntraVENous PRN    pantoprazole (PROTONIX) 40 mg in sodium chloride 0.9% 10 mL injection  40 mg IntraVENous Q12H    sucralfate (CARAFATE) tablet 1 g  1 g Oral AC&HS    sodium chloride (NS) flush 5-40 mL  5-40 mL IntraVENous Q8H    sodium chloride (NS) flush 5-40 mL  5-40 mL IntraVENous PRN          Objective:     Patient Vitals for the past 8 hrs:   BP Temp Pulse Resp SpO2   08/27/19 0345 -- -- 66 -- --   08/27/19 0206 131/65 98.5 °F (36.9 °C) 67 16 99 %     No intake/output data recorded. 08/25 1901 - 08/27 0700  In: -   Out: 1500     Physical Exam: Lungs: clear to auscultation bilaterally  Heart: regular rate and rhythm, S1, S2 normal, no murmur, click, rub or gallop  Abdomen: soft, non-tender.  Bowel sounds normal. No masses,  no organomegaly        Data Review   Recent Results (from the past 24 hour(s))   EKG, 12 LEAD, INITIAL    Collection Time: 08/26/19  7:50 AM   Result Value Ref Range    Ventricular Rate 86 BPM    Atrial Rate 86 BPM    P-R Interval 298 ms    QRS Duration 80 ms    Q-T Interval 366 ms    QTC Calculation (Bezet) 437 ms    Calculated P Axis 39 degrees    Calculated R Axis -2 degrees    Calculated T Axis 57 degrees    Diagnosis       Sinus rhythm with 1st degree AV block  Otherwise normal ECG  When compared with ECG of 22-AUG-2019 20:21,  Nonspecific T wave abnormality no longer evident in Lateral leads             Assessment:     Active Problems:    ESRD (end stage renal disease) ([facility] (7/21/2011)      Anemia (8/22/2019)      Second degree heart block (8/26/2019)        Plan:     1) Will call his sister to help us with this situation

## 2019-08-27 NOTE — PROGRESS NOTES
RENA received a call from Palomo Lucas from [facility]. She stated that although her physician has accepted this pt, they will not have a bed for at least 72 hours and they cannot even promise one at that time.  Twila Juarez

## 2019-08-27 NOTE — PROGRESS NOTES
Bedside and Verbal shift change report given to Edel Gilbert (oncoming nurse) by Denisse Dutta (offgoing nurse). Report included the following information SBAR, Kardex, Intake/Output, MAR and Recent Results.

## 2019-08-27 NOTE — PROGRESS NOTES
Problem: Mobility Impaired (Adult and Pediatric)  Goal: *Acute Goals and Plan of Care (Insert Text)  Description  FUNCTIONAL STATUS PRIOR TO ADMISSION: Patient was modified independent using a Rolling walker for functional mobility. HOME SUPPORT PRIOR TO ADMISSION: The patient lived with 2 daughters but did not require physical assist. Daughters do cleaning, driving, and errand running. Physical Therapy Goals  Initiated 8/27/2019  1. Patient will move from supine to sit and sit to supine  in bed with modified independence within 7 day(s). 2.  Patient will transfer from bed to chair and chair to bed with modified independence using the least restrictive device within 7 day(s). 3.  Patient will perform sit to stand with modified independence within 7 day(s). 4.  Patient will ambulate with modified independence for 150 feet with the least restrictive device within 7 day(s). 5.  Patient will ascend/descend 12 stairs with 2 handrail(s) with supervision/set-up within 7 day(s). Outcome: Progressing Towards Goal     PHYSICAL THERAPY EVALUATION  Patient: Natacha Cleveland (20 y.o. male)  Date: 8/27/2019  Primary Diagnosis: Anemia [D64.9]  ESRD (end stage renal disease) ([facility] [N18.6]  Procedure(s) (LRB):  ESOPHAGOGASTRODUODENOSCOPY (EGD) (N/A)  CAPSULE (N/A)     Precautions: Bilat transmetatarsal amputations, low HR (may need PM)         ASSESSMENT  Based on the objective data described below, the patient presents with episodes of bradycardia, bilat mid-foot amputation and moderate fall risk. However, it appears that patient is approaching functional baseline. He is steady with a RW in his room without assist. Will continue to follow secondary to cardiac concerns, however patient is hoping to transfer to [facility] when a bed becomes available. Patient would benefit from stair negotiation prior to safe home discharge.       Current Level of Function Impacting Discharge (mobility/balance): vital sign monitoring    Functional Outcome Measure: The patient scored 18/28 on the Tinetti outcome measure which is indicative of moderate fall risk. Other factors to consider for discharge: stairs      Patient will benefit from skilled therapy intervention to address the above noted impairments. PLAN :  Recommendations and Planned Interventions: transfer training, gait training, therapeutic exercises, neuromuscular re-education, patient and family training/education and therapeutic activities      Frequency/Duration: Patient will be followed by physical therapy:  3 times a week to address goals. Recommendation for discharge: (in order for the patient to meet his/her long term goals)  No skilled physical therapy/ follow up rehabilitation needs identified at this time. This discharge recommendation:  A follow-up discussion with the attending provider and/or case management is planned    Equipment recommendations for successful discharge (if) home: none, has personal RW         SUBJECTIVE:   Patient stated I've never fallen in my life.     OBJECTIVE DATA SUMMARY:   HISTORY:    Past Medical History:   Diagnosis Date    Arthritis     GOUT    Cancer ([facility] 2015    PROSTATE    Chronic kidney disease     KIDNEY FAILURE    Dialysis patient ([facility] 10/11/2016    ESRD on dialysis ([facility] 8/23/2016    Heme positive stool 4/1/2017    3/31/17    HTN (hypertension) 7/21/2011    Kidney disease 7/21/2011    Peripheral vascular disease ([facility] 7/26/2017    S/P transmetatarsal amputation of foot, right ([facility]     Toe amputation status ([facility] 8/23/2016    Type II or unspecified type diabetes mellitus without mention of complication, not stated as uncontrolled     PATIENT DENIES     Past Surgical History:   Procedure Laterality Date    HX PROSTATECTOMY  OCT 2015    BIOPSY ONLY    VASCULAR SURGERY PROCEDURE UNLIST  6/21/11    UPPER R CHEST DIALYSIS ACCESS    VASCULAR SURGERY PROCEDURE UNLIST  2011    SHUNT RIGHT FOREARM FOR DIALYSIS CHEST ACCESS REMOVED       Personal factors and/or comorbidities impacting plan of care: bilat transmetatarsal amputations    Home Situation  Home Environment: Private residence  # Steps to Enter: 3  Rails to Enter: Yes  Hand Rails : Bilateral  Wheelchair Ramp: No  One/Two Story Residence: Two story  # of Interior Steps: 12  Interior Rails: Both  Lift Chair Available: No  Living Alone: No  Support Systems: Child(vick)(Lives with 2 daughters)  Patient Expects to be Discharged to[de-identified] Private residence  Current DME Used/Available at Home: Walker, rolling    EXAMINATION/PRESENTATION/DECISION MAKING:     Hearing: Auditory  Auditory Impairment: None     Strength:    Strength: Generally decreased, functional     Tone & Sensation:   Tone: Normal  Sensation: Impaired(Foot neuropathy )      Coordination:  Coordination: Within functional limits    Functional Mobility:  Bed Mobility:  Supine to Sit: Supervision  Sit to Supine: Supervision     Transfers:  Sit to Stand: Contact guard assistance  Stand to Sit: Contact guard assistance  Bed to Chair: Contact guard assistance; Adaptive equipment     Balance:   Sitting: Intact  Standing: Intact; With support  Ambulation/Gait Training:  Distance (ft): 50 Feet (ft)  Assistive Device: Gait belt;Walker, rolling  Ambulation - Level of Assistance: Contact guard assistance; Adaptive equipment        Gait Abnormalities: Decreased step clearance;Trunk sway increased        Base of Support: Center of gravity altered; Widened     Speed/Lisseth: Slow  Step Length: Right shortened;Left shortened      Functional Measure:  Tinetti test:    Sitting Balance: 1  Arises: 1  Attempts to Rise: 2  Immediate Standing Balance: 1  Standing Balance: 1  Nudged: 2  Eyes Closed: 0  Turn 360 Degrees - Continuous/Discontinuous: 1  Turn 360 Degrees - Steady/Unsteady: 1  Sitting Down: 2  Balance Score: 12 Balance total score  Indication of Gait: 1  R Step Length/Height: 0  L Step Length/Height: 0  R Foot Clearance: 1  L Foot Clearance: 1  Step Symmetry: 1  Step Continuity: 1  Path: 1  Trunk: 0  Walking Time: 0  Gait Score: 6 Gait total score  Total Score: 18/28 Overall total score         Tinetti Tool Score Risk of Falls  <19 = High Fall Risk  19-24 = Moderate Fall Risk  25-28 = Low Fall Risk  Williametti ME. Performance-Oriented Assessment of Mobility Problems in Elderly Patients. [facility]; [id number]. (Scoring Description: PT Bulletin Feb. 10, 1993)    Older adults: Agustina Hodge et al, 2009; n = 1000 [facility] elderly evaluated with ABC, GENET, ADL, and IADL)  · Mean GENET score for males aged 69-68 years = 26.21(3.40)  · Mean GENET score for females age 69-68 years = 25.16(4.30)  · Mean GENET score for males over 80 years = 23.29(6.02)  · Mean GENET score for females over 80 years = 17.20(8.32)           Physical Therapy Evaluation Charge Determination   History Examination Presentation Decision-Making   MEDIUM  Complexity : 1-2 comorbidities / personal factors will impact the outcome/ POC  LOW Complexity : 1-2 Standardized tests and measures addressing body structure, function, activity limitation and / or participation in recreation  LOW Complexity : Stable, uncomplicated  LOW Complexity : FOTO score of       Based on the above components, the patient evaluation is determined to be of the following complexity level: LOW     Pain Ratin/10    Activity Tolerance:   Good and SpO2 stable on RA, HR >68 bpm throughout PT session. Please refer to the flowsheet for vital signs taken during this treatment. After treatment patient left in no apparent distress:   Sitting in chair and Call bell within reach    COMMUNICATION/EDUCATION:   The patients plan of care was discussed with: Registered Nurse. Fall prevention education was provided and the patient/caregiver indicated understanding., Patient/family have participated as able in goal setting and plan of care.  and Patient/family agree to work toward stated goals and plan of care.     Thank you for this referral.  Brittanie Valenzuela, PT, DPT  Geriatric Clinical Specialist     Time Calculation: 15 mins

## 2019-08-27 NOTE — PROGRESS NOTES
RENA received a call from Zhanna Campoverde ([phone]) at the [facility] transfer center. She stated that Dr. Jairo Buerger at [facility] has accepted this pt as a transfer. However, they do not have any beds at this time. RENA met with pt to inform him of this. He still wishes to be transferred to [facility]. RENA faxed clinicals to the [facility] transfer center.  [facility], [facility]

## 2019-08-27 NOTE — PROGRESS NOTES
Spoke with Hospitalist at [facility]- He has been put on low priority list for transfer.

## 2019-08-27 NOTE — PROGRESS NOTES
Bedside and Verbal shift change report given to Jud Dale RN (oncoming nurse) by Ty Street RN (offgoing nurse). Report included the following information SBAR, Kardex, ED Summary, OR Summary, Procedure Summary, Intake/Output, MAR and Recent Results.

## 2019-08-27 NOTE — PROGRESS NOTES
7416    Call from Telemetry about pt having an episode of bradycardia of 31bpm and a run of 2nd degree type 1 occurring at 0337    0406  Rhythm Strips sent to Sandor Alonzo MD    0776  Dr. Unique Lao called back, informed of rhythm and HR. No orders given. MD mentioned that the plan was for the pt to be sent to [facility] at some point today.

## 2019-08-28 VITALS
TEMPERATURE: 97.7 F | BODY MASS INDEX: 27.65 KG/M2 | HEART RATE: 69 BPM | WEIGHT: 181.88 LBS | OXYGEN SATURATION: 100 % | SYSTOLIC BLOOD PRESSURE: 133 MMHG | DIASTOLIC BLOOD PRESSURE: 69 MMHG | RESPIRATION RATE: 14 BRPM

## 2019-08-28 LAB
ATRIAL RATE: 73 BPM
CALCULATED P AXIS, ECG09: 26 DEGREES
CALCULATED R AXIS, ECG10: -2 DEGREES
CALCULATED T AXIS, ECG11: 27 DEGREES
DIAGNOSIS, 93000: NORMAL
HCT VFR BLD AUTO: 19.7 % (ref 36.6–50.3)
HGB BLD-MCNC: 6 G/DL (ref 12.1–17)
P-R INTERVAL, ECG05: 304 MS
Q-T INTERVAL, ECG07: 386 MS
QRS DURATION, ECG06: 76 MS
QTC CALCULATION (BEZET), ECG08: 425 MS
VENTRICULAR RATE, ECG03: 73 BPM

## 2019-08-28 PROCEDURE — 94760 N-INVAS EAR/PLS OXIMETRY 1: CPT

## 2019-08-28 PROCEDURE — 86923 COMPATIBILITY TEST ELECTRIC: CPT

## 2019-08-28 PROCEDURE — P9016 RBC LEUKOCYTES REDUCED: HCPCS

## 2019-08-28 PROCEDURE — 36430 TRANSFUSION BLD/BLD COMPNT: CPT

## 2019-08-28 PROCEDURE — C9113 INJ PANTOPRAZOLE SODIUM, VIA: HCPCS | Performed by: NURSE PRACTITIONER

## 2019-08-28 PROCEDURE — 86900 BLOOD TYPING SEROLOGIC ABO: CPT

## 2019-08-28 PROCEDURE — 85018 HEMOGLOBIN: CPT

## 2019-08-28 PROCEDURE — 74011250636 HC RX REV CODE- 250/636: Performed by: NURSE PRACTITIONER

## 2019-08-28 PROCEDURE — 74011250637 HC RX REV CODE- 250/637: Performed by: NURSE PRACTITIONER

## 2019-08-28 PROCEDURE — 93005 ELECTROCARDIOGRAM TRACING: CPT

## 2019-08-28 PROCEDURE — 36415 COLL VENOUS BLD VENIPUNCTURE: CPT

## 2019-08-28 PROCEDURE — 74011250637 HC RX REV CODE- 250/637: Performed by: FAMILY MEDICINE

## 2019-08-28 PROCEDURE — 90935 HEMODIALYSIS ONE EVALUATION: CPT

## 2019-08-28 PROCEDURE — 74011000250 HC RX REV CODE- 250: Performed by: NURSE PRACTITIONER

## 2019-08-28 RX ADMIN — Medication 10 ML: at 06:00

## 2019-08-28 RX ADMIN — SUCRALFATE 1 G: 1 TABLET ORAL at 06:43

## 2019-08-28 RX ADMIN — ASCORBIC ACID, THIAMINE MONONITRATE,RIBOFLAVIN, NIACINAMIDE, PYRIDOXINE HYDROCHLORIDE, FOLIC ACID, CYANOCOBALAMIN, BIOTIN, CALCIUM PANTOTHENATE, 1 CAPSULE: 100; 1.5; 1.7; 20; 10; 1; 6000; 150000; 5 CAPSULE, LIQUID FILLED ORAL at 12:51

## 2019-08-28 RX ADMIN — METOPROLOL TARTRATE 25 MG: 25 TABLET ORAL at 12:51

## 2019-08-28 RX ADMIN — SUCRALFATE 1 G: 1 TABLET ORAL at 12:51

## 2019-08-28 RX ADMIN — SODIUM CHLORIDE 40 MG: 9 INJECTION INTRAMUSCULAR; INTRAVENOUS; SUBCUTANEOUS at 12:52

## 2019-08-28 NOTE — PROGRESS NOTES
[facility] KIDNEY       Renal Daily Progress Note:     Admission Date: 2019     Subjective:  - Seen on HD. Tolerating well     Review of Systems  No new complaints were offered     Objective:     Visit Vitals  /62   Pulse 64 Comment: patient calm and alert   Temp 98.1 °F (36.7 °C)   Resp 16   Wt 80.7 kg (177 lb 14.6 oz)   SpO2 99%   BMI 27.05 kg/m²     Temp (24hrs), Av.1 °F (36.7 °C), Min:97.7 °F (36.5 °C), Max:98.3 °F (36.8 °C)      No intake or output data in the 24 hours ending 19 0905  Current Facility-Administered Medications   Medication Dose Route Frequency    acetaminophen (TYLENOL) tablet 650 mg  650 mg Oral Q6H PRN    albuterol-ipratropium (DUO-NEB) 2.5 MG-0.5 MG/3 ML  3 mL Nebulization Q6H PRN    B complex-vitaminC-folic acid (NEPHROCAP) cap  1 Cap Oral DAILY    latanoprost (XALATAN) 0.005 % ophthalmic solution 1 Drop  1 Drop Both Eyes QHS    metoprolol tartrate (LOPRESSOR) tablet 25 mg  25 mg Oral BID    albuterol (PROVENTIL VENTOLIN) nebulizer solution 2.5 mg  2.5 mg Nebulization Q4H PRN    0.9% sodium chloride infusion 250 mL  250 mL IntraVENous PRN    pantoprazole (PROTONIX) 40 mg in sodium chloride 0.9% 10 mL injection  40 mg IntraVENous Q12H    sucralfate (CARAFATE) tablet 1 g  1 g Oral AC&HS    sodium chloride (NS) flush 5-40 mL  5-40 mL IntraVENous Q8H    sodium chloride (NS) flush 5-40 mL  5-40 mL IntraVENous PRN       Physical Exam:  GENERAL:  NAD, lying comfortably in bed. HD ongoing   HEENT:  Normocephalic atraumatic cranium.  Conjunctivae and sclerae are clear.    LUNGS:  No crackles, no wheezes    CARDIAC:  Regular rhythm.  No S3 gallop.  No rub. ABDOMEN:  Soft with normoactive bowel sounds.  No tenderness   EXTREMITIES:   No  LE edema, no cyanosis. RUE  AVF         Data Review:     LABS:  No results for input(s): NA, K, CL, CO2, BUN, CREA, CA, ALB, PHOS, MG in the last 72 hours.   Recent Labs     19  0821 19  0453 19  1004   WBC  --  8.6 --    HGB 6.9* 7.0* 9.6*   HCT 23.0* 22.6* 31.8*   PLT  --  217  --      No results for input(s): SKYLER, KU, PAMU, CREDOROTHY in the last 72 hours. No lab exists for component: PROU    Assessment:   Renal Specific Problems    ESRD on HD     Anemia     GI bleed       Plan:     Obtain/ Order: labs/cultures/radiology/procedures:      Therapeutic:    Continue with HD today.  He is on MWF schedule     Dose all meds to ESRD on HD     Start EPO 8000 units with HD       Giselle Lovell MD

## 2019-08-28 NOTE — DIALYSIS
[facility] D[facility] Team [facility]  [phone]    Vitals   Pre   Post   Assessment   Pre   Post     Temp  Temp: 98.1 °F (36.7 °C) (08/28/19 0815)  97.7 LOC  A&OX3 A&OX3   HR   74 62 Lungs   clear  clear   B/P   143/72 150/67 Cardiac   Remote tele, paced  remote tele,  paced   Resp   16 14 Skin   Warm, dry  warm, dry   Pain level  0/10 0/10 Edema  None noted    None noted   Orders:    Duration:   Start:    0815 End:    1145 Total:   3.5 hours   Dialyzer:   Dialyzer/Set Up Inspection: [id number]) (08/28/19 0745)   K Bath:   Dialysate K (mEq/L): 3 (08/28/19 0815)   Ca Bath:   Dialysate CA (mEq/L): 2.5 (08/28/19 0815)   Na/Bicarb:   Dialysate NA (mEq/L): 140 (08/28/19 0815)   Target Fluid Removal:   Goal/Amount of Fluid to Remove (mL): 1500 mL (08/28/19 0745)   Access     Type & Location:   RFA AVG. Site found free of pain, drainage, or redness. +bruitt/thrill. Cannulated with 15 gauge needle x2. Flashes, aspiration, and NS flushes X2. Labs     Obtained/Reviewed   Critical Results Called   Date when labs were drawn-  Hgb-    HGB   Date Value Ref Range Status   08/27/2019 6.9 (L) 12.1 - 17.0 g/dL Final     K-    Potassium   Date Value Ref Range Status   08/23/2019 3.8 3.5 - 5.1 mmol/L Final     Ca-   Calcium   Date Value Ref Range Status   08/23/2019 8.8 8.5 - 10.1 MG/DL Final     Bun-   BUN   Date Value Ref Range Status   08/23/2019 21 (H) 6 - 20 MG/DL Final     Creat-   Creatinine   Date Value Ref Range Status   08/23/2019 7.55 (H) 0.70 - 1.30 MG/DL Final        Medications/ Blood Products Given     Name   Dose   Route and Time     PRBC I unit  Given via AVG during HD Tx. @ 1030             Blood Volume Processed (BVP):    77 Net Fluid   Removed:  1500 mL   Comments   Time Out Done: 0800  Primary Nurse Rpt Pre: Rolan Monahan RN  Primary Nurse Rpt Post:  Malvin Nguyen RN  Pt Education: Access  Care Plan: continue to follow Dr. Raoul Witt HD plan  Tx Summary:  Patient Tolerated Tx. well.  1 unit of PRBC given as documented by primary RN. Upon completion of Tx. blood in circuit returned to patient with 300 ml NS. Cannulas removed x2. Sterile DSGs applied and pressure held for 5 minutes. No bleeding noted. Positive bruitt/thrill. Admiting Diagnosis:  Pt's previous clinic-  Consent signed - Informed Consent Verified: Yes (08/28/19 0745)  [facility] Consent - verified  Hepatitis Status- Hep B antigen Negative 07/10/19,    Hep B antibody 60, immune 07/10/19  Machine #- Machine Number: [id number] (08/28/19 0745)  Telemetry status- remote tele  Pre-dialysis wt. - Pre-Dialysis Weight: 84.5 kg (186 lb 4.6 oz) (08/28/19 0745)

## 2019-08-28 NOTE — PROGRESS NOTES
Maurice Abad the dialysis nurse said he would be here either at 7am or 1 pm to do dialysis on pt.  Aware that HGB needs to be drawn and agreed to do so per pt request.

## 2019-08-28 NOTE — PROGRESS NOTES
Patient refusing PT at this time stating \"I'm leaving. I'm getting out of here no matter what you say. \" Patient reassured and will \"wait for the Doctor. \" Nursing notified. Will follow up tomorrow as appropriate, though it seems that patient is refusing most care. If patient refuses again, will sign off.      Maria R Christian PT, DPT  Geriatric Clinical Specialist

## 2019-08-28 NOTE — PROGRESS NOTES
Paged Dr. Lari Georgis Dr. Darryle Brave that pt's sister would like to speak with him. He stated he will call her. [phone]-Paged was d/c AMA in a wheelchair by PCT.

## 2019-08-28 NOTE — PROGRESS NOTES
Lucinda Holm, Dariana Pollard, and Yana Mauro Date: 8/22/2019      Subjective:     Patient had 2nd degree heart block again last night. Refused Hgb this AM. Is basically refusing all care except dialysis. [facility] refused to accept him in transfer yesterday citing that he can get any care he needs at this hospital. .       Current Facility-Administered Medications   Medication Dose Route Frequency    acetaminophen (TYLENOL) tablet 650 mg  650 mg Oral Q6H PRN    albuterol-ipratropium (DUO-NEB) 2.5 MG-0.5 MG/3 ML  3 mL Nebulization Q6H PRN    B complex-vitaminC-folic acid (NEPHROCAP) cap  1 Cap Oral DAILY    latanoprost (XALATAN) 0.005 % ophthalmic solution 1 Drop  1 Drop Both Eyes QHS    metoprolol tartrate (LOPRESSOR) tablet 25 mg  25 mg Oral BID    albuterol (PROVENTIL VENTOLIN) nebulizer solution 2.5 mg  2.5 mg Nebulization Q4H PRN    0.9% sodium chloride infusion 250 mL  250 mL IntraVENous PRN    pantoprazole (PROTONIX) 40 mg in sodium chloride 0.9% 10 mL injection  40 mg IntraVENous Q12H    sucralfate (CARAFATE) tablet 1 g  1 g Oral AC&HS    sodium chloride (NS) flush 5-40 mL  5-40 mL IntraVENous Q8H    sodium chloride (NS) flush 5-40 mL  5-40 mL IntraVENous PRN          Objective:     Patient Vitals for the past 8 hrs:   BP Temp Pulse Resp SpO2   08/28/19 0330 159/83 98.3 °F (36.8 °C) 82 15 99 %     No intake/output data recorded. 08/26 0701 - 08/27 1900  In: -   Out: 1500     Physical Exam: Lungs: clear to auscultation bilaterally  Heart: regular rate and rhythm, S1, S2 normal, no murmur, click, rub or gallop  Abdomen: soft, non-tender.  Bowel sounds normal. No masses,  no organomegaly        Data Review   Recent Results (from the past 24 hour(s))   HGB & HCT    Collection Time: 08/27/19  8:21 AM   Result Value Ref Range    HGB 6.9 (L) 12.1 - 17.0 g/dL    HCT 23.0 (L) 36.6 - 50.3 %           Assessment:     Active Problems:    ESRD (end stage renal disease) ([facility] (7/21/2011)      Anemia (8/22/2019)      Second degree heart block (8/26/2019)        Plan:     1) dialysis today  2) transfuse if needed.

## 2019-08-28 NOTE — PROGRESS NOTES
Bedside and Verbal shift change report given to Lisseth العراقي RN (oncoming nurse) by Minna Willingham (offgoing nurse). Report included the following information SBAR, Kardex, ED Summary, Procedure Summary, Intake/Output, MAR and Recent Results.

## 2019-08-28 NOTE — PROGRESS NOTES
Patient demanding to go home. He has received dialysis and 1 unit pRBCs today. I have discussed with him on 3 occasions that he has a heart condition, 2nd degree heart block, that can worsen and cause significant worsening of his condition or death. We could treat this with a pacemaker insertion recommeded by Cardiology, Dr Josh Chris, but he refuses. He says he doesn't need it. He also has continued slow GI bleeding which could worsen and he could get complication from this including MI, or death. He refuses this further work up as well. I have tried to transfer him to [facility] where his regular MDs are but they refuse to accept him. I have informed his closest relative, [facility], who understands and agrees that his refusal to be treated is dangerous and she will try to encourage him to seek further care with his regular MDs at [facility]. Edgardo Mitchell He has appt at dialysis in 2 days. Has appt with his primary care at [facility] on 9/3/19. He will resume his previous home meds. He signed a AMA form- to leave the hospital against medical advice.

## 2019-08-28 NOTE — PROGRESS NOTES
0327  Telemetry unit called because Pt  At 0326 had HR 33 2:1 2nd degree AVB Type 2 with Winkebach (strips sent to 5 south)    0330  Vitals on pt obtained HR 82 /83  Pt asymptomatic    0340 Strip reads HR 73 SR 1 AVB --> 2nd degree AVB type 1 (strips sent to 5 south)    0345  Paged MD arteaga for Dr. Montez Urbano. Dr. Cyndi Rosa returned MD kristy was informed of occurrence. No orders received, will continue to monitor.

## 2019-08-29 LAB
ABO + RH BLD: NORMAL
BLD PROD TYP BPU: NORMAL
BLOOD GROUP ANTIBODIES SERPL: NORMAL
BPU ID: NORMAL
CROSSMATCH RESULT,%XM: NORMAL
SPECIMEN EXP DATE BLD: NORMAL
STATUS OF UNIT,%ST: NORMAL
UNIT DIVISION, %UDIV: 0

## 2019-09-16 NOTE — DISCHARGE SUMMARY
Physician Discharge Summary     Patient ID:  Anita Orozco  [id number]  66 y.o.  1950    Admit date: 8/22/2019    Discharge date and time: 9/15/2019    Admission Diagnoses: Anemia [D64.9];ESRD (end stage renal disease) ([facility] [N18.6]    Discharge Diagnoses:  Principal Diagnosis <principal problem not specified>                                            Active Problems:    ESRD (end stage renal disease) ([facility] (7/21/2011)      Anemia (8/22/2019)      Second degree heart block (8/26/2019)           Hospital Course: Patient used to be a patient of mine, was admitted onto hospitalist service for anemia sent from his dialysis center. Hgb- 6. He felt fine with no complaints. He continued dialysis in hospital. He was seen by GI who wanted to do EGD and possible colonoscopy. He was found on telemetry to go from 1st degree heart block into 2nd degree heart block , usually at night. This was a new issue for him. The GI doctors would not do the procedures until cleared by Cardiology. Cardiology saw him and felt he needed a pacemaker which he refused to get. He did get 2 units of pRBCs during his dialysis while in the hospital.     I had numerous discussions with him about his heart issue and he refused treatment for it. He then wanted to be transferred to [facility] where his primary care physician was. I tried to accomplish this but they refused to accept him stating that he could get all the reasonable care he needs at [facility]. I discussed this with he and his sister who is a patient of mine. He again refused any further treatment. He then decided to leave the hospital AMA. He was of sound mind to make this decision.      PCP[facility]    Consults: Cardiology, GI and Nephrology        Discharge Exam:  Visit Vitals  /69   Pulse 69   Temp 97.7 °F (36.5 °C)   Resp 14   Wt 181 lb 14.1 oz (82.5 kg)   SpO2 100%   BMI 27.65 kg/m²     Lungs: clear to auscultation bilaterally  Heart: regular rate and rhythm, S1, S2 normal, no murmur, click, rub or gallop  Abdomen: soft, non-tender. Bowel sounds normal. No masses,  no organomegaly    Disposition: home    Patient Instructions:   Cannot display discharge medications since this patient is not currently admitted. Activity: Activity as tolerated  Diet: Regular Diet  Wound Care: None needed    No orders of the defined types were placed in this encounter.          Signed:  Scottie Newby MD  9/15/2019  9:14 PM

## 2020-03-10 ENCOUNTER — OFFICE VISIT (OUTPATIENT)
Dept: CARDIOLOGY CLINIC | Age: 70
End: 2020-03-10

## 2020-03-10 VITALS — BODY MASS INDEX: 27.65 KG/M2 | HEIGHT: 68 IN

## 2020-03-10 DIAGNOSIS — Z45.018 PACEMAKER REPROGRAMMING/CHECK: Primary | ICD-10-CM

## 2020-03-10 NOTE — PROGRESS NOTES
1. Have you been to the ER, urgent care clinic since your last visit? Hospitalized since your last visit? No    2. Have you seen or consulted any other health care providers outside of the [address] [place] [address] since your last visit? Include any pap smears or colon screening.  No

## 2021-03-09 NOTE — PERIOP NOTES
Left msg with 's office making them aware that we are unable to lv msg on any contact #'s for this patient.

## 2021-03-10 NOTE — PERIOP NOTES
[facility]  Preoperative Instructions        Surgery Date 3/23/21          Time of Arrival 0845    1. On the day of your surgery, please report to the Surgical Services Registration Desk and sign in at your designated time. The Surgery Center is located to the right of the Emergency Room. 2. You must have someone with you to drive you home. You should not drive a car for 24 hours following surgery. Please make arrangements for a friend or family member to stay with you for the first 24 hours after your surgery. 3. Do not have anything to eat or drink (including water, gum, mints, coffee, juice) after midnight 3/22/21?? Darlynn Magic ? This may not apply to medications prescribed by your physician. ?(Please note below the special instructions with medications to take the morning of your procedure.)    4. We recommend you do not drink any alcoholic beverages for 24 hours before and after your surgery. 5. Contact your surgeons office for instructions on the following medications: non-steroidal anti-inflammatory drugs (i.e. Advil, Aleve), vitamins, and supplements. (Some surgeons will want you to stop these medications prior to surgery and others may allow you to take them)  **If you are currently taking Plavix, Coumadin, Aspirin and/or other blood-thinning agents, contact your surgeon for instructions. ** Your surgeon will partner with the physician prescribing these medications to determine if it is safe to stop or if you need to continue taking. Please do not stop taking these medications without instructions from your surgeon    6. Wear comfortable clothes. Wear glasses instead of contacts. Do not bring any money or jewelry. Please bring picture ID, insurance card, and any prearranged co-payment or hospital payment. Do not wear make-up, particularly mascara the morning of your surgery. Do not wear nail polish, particularly if you are having foot /hand surgery.   Wear your hair loose or down, no ponytails, buns, henry pins or clips. All body piercings must be removed. Please shower with antibacterial soap for three consecutive days before and on the morning of surgery, but do not apply any lotions, powders or deodorants after the shower on the day of surgery. Please use a fresh towels after each shower. Please sleep in clean clothes and change bed linens the night before surgery. Please do not shave for 48 hours prior to surgery. Shaving of the face is acceptable. 7. You should understand that if you do not follow these instructions your surgery may be cancelled. If your physical condition changes (I.e. fever, cold or flu) please contact your surgeon as soon as possible. 8. It is important that you be on time. If a situation occurs where you may be late, please call [phone] (OR Holding Area). 9. If you have any questions and or problems, please call [phone] (Pre-admission Testing). 10. Your surgery time may be subject to change. You will receive a phone call the evening prior if your time changes. 11.  If having outpatient surgery, you must have someone to drive you here, stay with you during the duration of your stay, and to drive you home at time of discharge. Special Instructions: use & bring inhalers. ASA per surgeon. TAKE ALL MEDICATIONS DAY OF SURGERY EXCEPT:vitamins      I understand a pre-operative phone call will be made to verify my surgery time. In the event that I am not available, I give permission for a message to be left on my answering service and/or with another person?   {yes @ [phone].         ___________________      __________   _________    (Signature of Patient)             (Witness)                (Date and Time)

## 2021-03-12 NOTE — PERIOP NOTES
Dr. Nereida Chino reviewed ekg 9/25/20 and 8/28/19, [facility]. Will proceed with planned procedure.

## 2021-03-16 NOTE — PERIOP NOTES
Spoke with patient and daughter Delmon Kin about preop Covid testing. Patient unable to make 3/20 Covid appt d/t scheduled dialysis. Preop Covid testing changed to 3/19. Patient's daughter Delmon Kin verbalized understanding.

## 2021-03-19 ENCOUNTER — HOSPITAL ENCOUNTER (OUTPATIENT)
Dept: PREADMISSION TESTING | Age: 71
Discharge: HOME | End: 2021-03-19
Payer: MEDICARE

## 2021-03-19 LAB — SARS-COV-2, COV2: NORMAL

## 2021-03-19 PROCEDURE — U0003 INFECTIOUS AGENT DETECTION BY NUCLEIC ACID (DNA OR RNA); SEVERE ACUTE RESPIRATORY SYNDROME CORONAVIRUS 2 (SARS-COV-2) (CORONAVIRUS DISEASE [COVID-19]), AMPLIFIED PROBE TECHNIQUE, MAKING USE OF HIGH THROUGHPUT TECHNOLOGIES AS DESCRIBED BY CMS-2020-01-R: HCPCS

## 2021-03-20 LAB — SARS-COV-2, COV2NT: NOT DETECTED

## 2021-03-24 ENCOUNTER — ANESTHESIA (OUTPATIENT)
Dept: SURGERY | Age: 71
End: 2021-03-24
Payer: MEDICARE

## 2021-03-24 ENCOUNTER — ANESTHESIA EVENT (OUTPATIENT)
Dept: SURGERY | Age: 71
End: 2021-03-24
Payer: MEDICARE

## 2021-03-24 ENCOUNTER — HOSPITAL ENCOUNTER (OUTPATIENT)
Age: 71
Setting detail: OUTPATIENT SURGERY
Discharge: HOME | End: 2021-03-24
Attending: SURGERY | Admitting: SURGERY
Payer: MEDICARE

## 2021-03-24 VITALS
HEART RATE: 92 BPM | SYSTOLIC BLOOD PRESSURE: 95 MMHG | DIASTOLIC BLOOD PRESSURE: 65 MMHG | BODY MASS INDEX: 24.96 KG/M2 | OXYGEN SATURATION: 98 % | WEIGHT: 164.68 LBS | TEMPERATURE: 98.5 F | HEIGHT: 68 IN | RESPIRATION RATE: 16 BRPM

## 2021-03-24 DIAGNOSIS — L76.82 PAIN AT SURGICAL INCISION: Primary | ICD-10-CM

## 2021-03-24 LAB
ANION GAP BLD CALC-SCNC: 19 MMOL/L (ref 10–20)
BUN BLD-MCNC: 21 MG/DL (ref 9–20)
CA-I BLD-MCNC: 0.96 MMOL/L (ref 1.12–1.32)
CHLORIDE BLD-SCNC: 97 MMOL/L (ref 98–107)
CO2 BLD-SCNC: 26 MMOL/L (ref 21–32)
CREAT BLD-MCNC: 9.7 MG/DL (ref 0.6–1.3)
GLUCOSE BLD-MCNC: 108 MG/DL (ref 65–100)
HCT VFR BLD CALC: 47 % (ref 36.6–50.3)
POTASSIUM BLD-SCNC: 4 MMOL/L (ref 3.5–5.1)
SERVICE CMNT-IMP: ABNORMAL
SODIUM BLD-SCNC: 137 MMOL/L (ref 136–145)

## 2021-03-24 PROCEDURE — 77030008463 HC STPLR SKN PROX J&J -B: Performed by: SURGERY

## 2021-03-24 PROCEDURE — 74011000258 HC RX REV CODE- 258: Performed by: NURSE ANESTHETIST, CERTIFIED REGISTERED

## 2021-03-24 PROCEDURE — 74011250636 HC RX REV CODE- 250/636: Performed by: ANESTHESIOLOGY

## 2021-03-24 PROCEDURE — 77030002924 HC SUT GORTX WLGO -B: Performed by: SURGERY

## 2021-03-24 PROCEDURE — 74011000250 HC RX REV CODE- 250: Performed by: NURSE ANESTHETIST, CERTIFIED REGISTERED

## 2021-03-24 PROCEDURE — 76210000020 HC REC RM PH II FIRST 0.5 HR: Performed by: SURGERY

## 2021-03-24 PROCEDURE — 2709999900 HC NON-CHARGEABLE SUPPLY

## 2021-03-24 PROCEDURE — P9045 ALBUMIN (HUMAN), 5%, 250 ML: HCPCS

## 2021-03-24 PROCEDURE — 74011250636 HC RX REV CODE- 250/636: Performed by: SURGERY

## 2021-03-24 PROCEDURE — 80047 BASIC METABLC PNL IONIZED CA: CPT

## 2021-03-24 PROCEDURE — 2709999900 HC NON-CHARGEABLE SUPPLY: Performed by: SURGERY

## 2021-03-24 PROCEDURE — 77030038692 HC WND DEB SYS IRMX -B: Performed by: SURGERY

## 2021-03-24 PROCEDURE — 77030002916 HC SUT ETHLN J&J -A: Performed by: SURGERY

## 2021-03-24 PROCEDURE — 77030014008 HC SPNG HEMSTAT J&J -C: Performed by: SURGERY

## 2021-03-24 PROCEDURE — 76010000153 HC OR TIME 1.5 TO 2 HR: Performed by: SURGERY

## 2021-03-24 PROCEDURE — C1768 GRAFT, VASCULAR: HCPCS | Performed by: SURGERY

## 2021-03-24 PROCEDURE — 76210000008 HC OR PH I REC 6 TO 6.5 HR: Performed by: SURGERY

## 2021-03-24 PROCEDURE — 77030002996 HC SUT SLK J&J -A: Performed by: SURGERY

## 2021-03-24 PROCEDURE — 74011000250 HC RX REV CODE- 250: Performed by: ANESTHESIOLOGY

## 2021-03-24 PROCEDURE — 74011250636 HC RX REV CODE- 250/636: Performed by: NURSE ANESTHETIST, CERTIFIED REGISTERED

## 2021-03-24 PROCEDURE — 77030031139 HC SUT VCRL2 J&J -A: Performed by: SURGERY

## 2021-03-24 PROCEDURE — 77030002987 HC SUT PROL J&J -B: Performed by: SURGERY

## 2021-03-24 PROCEDURE — 74011250637 HC RX REV CODE- 250/637

## 2021-03-24 PROCEDURE — 76060000034 HC ANESTHESIA 1.5 TO 2 HR: Performed by: SURGERY

## 2021-03-24 PROCEDURE — 74011250636 HC RX REV CODE- 250/636

## 2021-03-24 DEVICE — PROPATEN VASCULAR GRAFT TW 7MMX40CM HEPARIN
Type: IMPLANTABLE DEVICE | Site: ARM | Status: FUNCTIONAL
Brand: GORE PROPATEN VASCULAR GRAFT

## 2021-03-24 RX ORDER — FENTANYL CITRATE 50 UG/ML
25 INJECTION, SOLUTION INTRAMUSCULAR; INTRAVENOUS
Status: DISCONTINUED | OUTPATIENT
Start: 2021-03-24 | End: 2021-03-24 | Stop reason: HOSPADM

## 2021-03-24 RX ORDER — MIDAZOLAM HYDROCHLORIDE 1 MG/ML
INJECTION, SOLUTION INTRAMUSCULAR; INTRAVENOUS AS NEEDED
Status: DISCONTINUED | OUTPATIENT
Start: 2021-03-24 | End: 2021-03-24 | Stop reason: HOSPADM

## 2021-03-24 RX ORDER — ALBUMIN HUMAN 50 G/1000ML
12.5 SOLUTION INTRAVENOUS ONCE
Status: COMPLETED | OUTPATIENT
Start: 2021-03-24 | End: 2021-03-24

## 2021-03-24 RX ORDER — ALBUMIN HUMAN 50 G/1000ML
SOLUTION INTRAVENOUS
Status: COMPLETED
Start: 2021-03-24 | End: 2021-03-24

## 2021-03-24 RX ORDER — PHENYLEPHRINE HYDROCHLORIDE 10 MG/ML
INJECTION INTRAVENOUS
Status: DISCONTINUED
Start: 2021-03-24 | End: 2021-03-25 | Stop reason: HOSPADM

## 2021-03-24 RX ORDER — HYDROCODONE BITARTRATE AND ACETAMINOPHEN 5; 325 MG/1; MG/1
1 TABLET ORAL
Qty: 15 TAB | Refills: 0 | Status: SHIPPED | OUTPATIENT
Start: 2021-03-24 | End: 2021-03-27

## 2021-03-24 RX ORDER — VANCOMYCIN/0.9 % SOD CHLORIDE 1.5G/250ML
1500 PLASTIC BAG, INJECTION (ML) INTRAVENOUS ONCE
Status: DISCONTINUED | OUTPATIENT
Start: 2021-03-24 | End: 2021-03-24 | Stop reason: DRUGHIGH

## 2021-03-24 RX ORDER — LIDOCAINE HYDROCHLORIDE 10 MG/ML
0.1 INJECTION, SOLUTION EPIDURAL; INFILTRATION; INTRACAUDAL; PERINEURAL AS NEEDED
Status: DISCONTINUED | OUTPATIENT
Start: 2021-03-24 | End: 2021-03-24 | Stop reason: HOSPADM

## 2021-03-24 RX ORDER — ROPIVACAINE HYDROCHLORIDE 5 MG/ML
INJECTION, SOLUTION EPIDURAL; INFILTRATION; PERINEURAL AS NEEDED
Status: DISCONTINUED | OUTPATIENT
Start: 2021-03-24 | End: 2021-03-24 | Stop reason: HOSPADM

## 2021-03-24 RX ORDER — HEPARIN SODIUM 1000 [USP'U]/ML
1900 INJECTION, SOLUTION INTRAVENOUS; SUBCUTANEOUS ONCE
Status: COMPLETED | OUTPATIENT
Start: 2021-03-24 | End: 2021-03-24

## 2021-03-24 RX ORDER — PHENYLEPHRINE HCL IN 0.9% NACL 0.4MG/10ML
SYRINGE (ML) INTRAVENOUS AS NEEDED
Status: DISCONTINUED | OUTPATIENT
Start: 2021-03-24 | End: 2021-03-24 | Stop reason: HOSPADM

## 2021-03-24 RX ORDER — HYDROCODONE BITARTRATE AND ACETAMINOPHEN 5; 325 MG/1; MG/1
TABLET ORAL
Status: COMPLETED
Start: 2021-03-24 | End: 2021-03-24

## 2021-03-24 RX ORDER — PROPOFOL 10 MG/ML
INJECTION, EMULSION INTRAVENOUS AS NEEDED
Status: DISCONTINUED | OUTPATIENT
Start: 2021-03-24 | End: 2021-03-24 | Stop reason: HOSPADM

## 2021-03-24 RX ORDER — SODIUM CHLORIDE, SODIUM LACTATE, POTASSIUM CHLORIDE, CALCIUM CHLORIDE 600; 310; 30; 20 MG/100ML; MG/100ML; MG/100ML; MG/100ML
25 INJECTION, SOLUTION INTRAVENOUS CONTINUOUS
Status: DISCONTINUED | OUTPATIENT
Start: 2021-03-24 | End: 2021-03-24 | Stop reason: HOSPADM

## 2021-03-24 RX ORDER — HYDROCODONE BITARTRATE AND ACETAMINOPHEN 5; 325 MG/1; MG/1
1 TABLET ORAL ONCE
Status: COMPLETED | OUTPATIENT
Start: 2021-03-24 | End: 2021-03-24

## 2021-03-24 RX ORDER — SODIUM CHLORIDE 0.9 % (FLUSH) 0.9 %
5-40 SYRINGE (ML) INJECTION EVERY 8 HOURS
Status: DISCONTINUED | OUTPATIENT
Start: 2021-03-24 | End: 2021-03-24 | Stop reason: HOSPADM

## 2021-03-24 RX ORDER — HEPARIN SODIUM 1000 [USP'U]/ML
INJECTION, SOLUTION INTRAVENOUS; SUBCUTANEOUS AS NEEDED
Status: DISCONTINUED | OUTPATIENT
Start: 2021-03-24 | End: 2021-03-24 | Stop reason: HOSPADM

## 2021-03-24 RX ORDER — SODIUM CHLORIDE 0.9 % (FLUSH) 0.9 %
5-40 SYRINGE (ML) INJECTION AS NEEDED
Status: DISCONTINUED | OUTPATIENT
Start: 2021-03-24 | End: 2021-03-24 | Stop reason: HOSPADM

## 2021-03-24 RX ORDER — DIPHENHYDRAMINE HYDROCHLORIDE 50 MG/ML
12.5 INJECTION, SOLUTION INTRAMUSCULAR; INTRAVENOUS AS NEEDED
Status: DISCONTINUED | OUTPATIENT
Start: 2021-03-24 | End: 2021-03-24 | Stop reason: HOSPADM

## 2021-03-24 RX ORDER — METOPROLOL TARTRATE 5 MG/5ML
INJECTION INTRAVENOUS AS NEEDED
Status: DISCONTINUED | OUTPATIENT
Start: 2021-03-24 | End: 2021-03-24 | Stop reason: HOSPADM

## 2021-03-24 RX ORDER — SODIUM CHLORIDE 9 MG/ML
25 INJECTION, SOLUTION INTRAVENOUS CONTINUOUS
Status: DISCONTINUED | OUTPATIENT
Start: 2021-03-24 | End: 2021-03-24 | Stop reason: HOSPADM

## 2021-03-24 RX ORDER — LIDOCAINE HYDROCHLORIDE AND EPINEPHRINE 20; 5 MG/ML; UG/ML
INJECTION, SOLUTION EPIDURAL; INFILTRATION; INTRACAUDAL; PERINEURAL AS NEEDED
Status: DISCONTINUED | OUTPATIENT
Start: 2021-03-24 | End: 2021-03-24 | Stop reason: HOSPADM

## 2021-03-24 RX ORDER — HYDROMORPHONE HYDROCHLORIDE 1 MG/ML
0.2 INJECTION, SOLUTION INTRAMUSCULAR; INTRAVENOUS; SUBCUTANEOUS
Status: DISCONTINUED | OUTPATIENT
Start: 2021-03-24 | End: 2021-03-24 | Stop reason: HOSPADM

## 2021-03-24 RX ORDER — PROPOFOL 10 MG/ML
INJECTION, EMULSION INTRAVENOUS
Status: DISCONTINUED | OUTPATIENT
Start: 2021-03-24 | End: 2021-03-24 | Stop reason: HOSPADM

## 2021-03-24 RX ORDER — HEPARIN SODIUM 1000 [USP'U]/ML
INJECTION, SOLUTION INTRAVENOUS; SUBCUTANEOUS
Status: DISCONTINUED
Start: 2021-03-24 | End: 2021-03-25 | Stop reason: HOSPADM

## 2021-03-24 RX ADMIN — Medication 120 MCG: at 11:56

## 2021-03-24 RX ADMIN — METOPROLOL TARTRATE 1 MG: 5 INJECTION, SOLUTION INTRAVENOUS at 12:41

## 2021-03-24 RX ADMIN — Medication 80 MCG: at 11:47

## 2021-03-24 RX ADMIN — SODIUM CHLORIDE 25 ML/HR: 9 INJECTION, SOLUTION INTRAVENOUS at 09:29

## 2021-03-24 RX ADMIN — Medication 120 MCG: at 11:33

## 2021-03-24 RX ADMIN — HEPARIN SODIUM 5000 UNITS: 1000 INJECTION, SOLUTION INTRAVENOUS; SUBCUTANEOUS at 11:59

## 2021-03-24 RX ADMIN — LIDOCAINE HYDROCHLORIDE,EPINEPHRINE BITARTRATE 20 ML: 20; .005 INJECTION, SOLUTION EPIDURAL; INFILTRATION; INTRACAUDAL; PERINEURAL at 10:48

## 2021-03-24 RX ADMIN — Medication 100 MCG: at 12:57

## 2021-03-24 RX ADMIN — SODIUM CHLORIDE 500 ML: 9 INJECTION, SOLUTION INTRAVENOUS at 16:25

## 2021-03-24 RX ADMIN — ALBUMIN HUMAN 12.5 G: 50 SOLUTION INTRAVENOUS at 15:14

## 2021-03-24 RX ADMIN — MIDAZOLAM HYDROCHLORIDE 2 MG: 1 INJECTION, SOLUTION INTRAMUSCULAR; INTRAVENOUS at 10:43

## 2021-03-24 RX ADMIN — METOPROLOL TARTRATE 1 MG: 5 INJECTION, SOLUTION INTRAVENOUS at 12:38

## 2021-03-24 RX ADMIN — MIDAZOLAM HYDROCHLORIDE 1 MG: 1 INJECTION, SOLUTION INTRAMUSCULAR; INTRAVENOUS at 11:40

## 2021-03-24 RX ADMIN — HYDROCODONE BITARTRATE AND ACETAMINOPHEN 1 TABLET: 5; 325 TABLET ORAL at 19:11

## 2021-03-24 RX ADMIN — Medication 120 MCG: at 11:37

## 2021-03-24 RX ADMIN — ALBUMIN (HUMAN) 12.5 G: 12.5 INJECTION, SOLUTION INTRAVENOUS at 15:14

## 2021-03-24 RX ADMIN — PROPOFOL 50 MG: 10 INJECTION, EMULSION INTRAVENOUS at 11:31

## 2021-03-24 RX ADMIN — METOPROLOL TARTRATE 1 MG: 5 INJECTION, SOLUTION INTRAVENOUS at 12:35

## 2021-03-24 RX ADMIN — Medication 80 MCG: at 11:50

## 2021-03-24 RX ADMIN — SODIUM CHLORIDE 30 MCG/MIN: 900 INJECTION, SOLUTION INTRAVENOUS at 14:00

## 2021-03-24 RX ADMIN — HEPARIN SODIUM 1900 UNITS: 1000 INJECTION INTRAVENOUS; SUBCUTANEOUS at 19:04

## 2021-03-24 RX ADMIN — VANCOMYCIN HYDROCHLORIDE 1000 MG: 1 INJECTION, POWDER, LYOPHILIZED, FOR SOLUTION INTRAVENOUS at 10:28

## 2021-03-24 RX ADMIN — ROPIVACAINE HYDROCHLORIDE 20 ML: 5 INJECTION, SOLUTION EPIDURAL; INFILTRATION; PERINEURAL at 10:48

## 2021-03-24 RX ADMIN — MIDAZOLAM HYDROCHLORIDE 1 MG: 1 INJECTION, SOLUTION INTRAMUSCULAR; INTRAVENOUS at 11:48

## 2021-03-24 RX ADMIN — PROPOFOL 50 MG: 10 INJECTION, EMULSION INTRAVENOUS at 11:29

## 2021-03-24 RX ADMIN — PROPOFOL 75 MCG/KG/MIN: 10 INJECTION, EMULSION INTRAVENOUS at 11:29

## 2021-03-24 RX ADMIN — PHENYLEPHRINE HYDROCHLORIDE 100 MCG/MIN: 10 INJECTION INTRAVENOUS at 12:01

## 2021-03-24 NOTE — PERIOP NOTES
Handoff Report from Operating Room to PACU    Report received from [address] and DOTTIE Mcneal CRNA regarding Jesús Stinson. Surgeon(s):  Juanita Rodriguez MD  And Procedure(s) (LRB):  CREATION RIGHT UPPER ARM ARTERIO VENOUS GRAFT (AXILLARY BLOCK) (EIP [id number]) (Right)  confirmed   with allergies and dressings discussed. Anesthesia type, drugs, patient history, complications, estimated blood loss, vital signs, intake and output, and last pain medication, lines, reversal medications and temperature were reviewed. 2:00 PM  Pt hypotensive, asymptomatic; Dr Shilo Castellanos at beside [facility] to start Cong gtt and wean as tolerated.  updated family in waiting room that pt not ready yet for d/c.     3:10 PM  Pt unable to wean off Cong gtt and maintain BP WNL -- discussed with Dr John Hickey to give Albumin, see MAR.     4:05 PM  Paged Dr Ellie Lei to discuss need for pressors and plan of care as pt was a scheduled discharge. 4:18 PM  Spoke with Dr Ferny Hutchinson to give 500ml NS bolus and continue to wean cong/monitor BP.     5:43 PM  Still weaning Cong as tolerated -- notified nephew Bunny Dahl who is waiting to transport patient home. 6:45 PM  Cong weaned off. Pt sat up in bed and ate dinner, anxious to be discharged. Requested sign out from anesthesia. 6:57 PM  Discharge papers provided; Reviewed DC instructions with nephew Bunny Dahl and patient. Opportunity for questions and clarifications was provided; verbalized understanding. Follow-up appointments reviewed/written for patient. Pt stable and ambulatory for discharge; nephew to provide transportation to home. Clarified all personal belongings sent with patient. IV removed (without difficulty/pt tolerated well) Telemetry discontinued.

## 2021-03-24 NOTE — BRIEF OP NOTE
Brief Postoperative Note    Patient: Jesús Stinson  YOB: 1950  MRN: [id number]    Date of Procedure: 3/24/2021     Pre-Op Diagnosis: ESRD    Post-Op Diagnosis: same      Procedure(s): RIGHT UPPER ARM STRAIGHT GRAFT (7mm Propaten)    Surgeon(s):  Juanita Rodriguez MD    Surgical Assistant: Surg Asst-1: Vear Hashimoto, RN    Anesthesia: block     Estimated Blood Loss (mL): minimal    Complications: none    Specimens: none     Implants:   Implant Name Type Inv.  Item Serial No.  Lot No. LRB No. Used Action   GRAFT VASC L40CM DIA7MM EPTFE HEP THN WALLED PROPATEN - [id number]  GRAFT VASC L40CM DIA7MM EPTFE HEP THN WALLED PROPATEN [id number]  [employer] NA Right 1 Implanted       Electronically Signed by Delfina Pham MD on 3/24/2021 at 12:54 PM

## 2021-03-24 NOTE — PERIOP NOTES
Spoke with Dr Marleny Knight in reference to blood pressure, patient asymptomatic. Dr Marleny Knight by to see patient and orders for STEF received. See MAR.

## 2021-03-24 NOTE — PERIOP NOTES
Patient Covid 19 test done 3/19/2021 and result negative. Patient states having to go to dialysis. Reports no signs or symptoms. Patient has cell phone, wallet and change but did not want to give to nephew in waiting area or have locked up with security. Patient wanted belongings all together with clothes. Wheelchair and belongings in [address] in pre op.

## 2021-03-24 NOTE — ANESTHESIA POSTPROCEDURE EVALUATION
Procedure(s):  CREATION RIGHT UPPER ARM ARTERIO VENOUS GRAFT (AXILLARY BLOCK) (EIP [id number]). regional, MAC, total IV anesthesia    Anesthesia Post Evaluation        Patient location during evaluation: PACU  Note status: Adequate. Level of consciousness: responsive to verbal stimuli and sleepy but conscious  Pain management: satisfactory to patient  Airway patency: patent  Anesthetic complications: no  Cardiovascular status: acceptable  Respiratory status: acceptable  Hydration status: acceptable  Comments: +Post-Anesthesia Evaluation and Assessment    Patient: Blayne Prasad MRN: [id number]  SSN: [id number]   YOB: 1950  Age: 69 y.o. Sex: male      Cardiovascular Function/Vital Signs    BP 95/65   Pulse 92   Temp 36.9 °C (98.5 °F)   Resp 16   Ht 5' 8\" (1.727 m)   Wt 74.7 kg (164 lb 10.9 oz)   SpO2 98%   BMI 25.04 kg/m²     Patient is status post Procedure(s):  CREATION RIGHT UPPER ARM ARTERIO VENOUS GRAFT (AXILLARY BLOCK) (EIP [id number]). Nausea/Vomiting: Controlled. Postoperative hydration reviewed and adequate. Pain:  Pain Scale 1: Numeric (0 - 10) (03/24/21 1745)  Pain Intensity 1: 0 (03/24/21 1745)   Managed. Neurological Status:   Neuro (WDL): Exceptions to WDL (03/24/21 1257)   At baseline. Mental Status and Level of Consciousness: Arousable. Pulmonary Status:   O2 Device: Room air (03/24/21 1445)   Adequate oxygenation and airway patent. Complications related to anesthesia: None    Post-anesthesia assessment completed. No concerns. Signed By: Dieudonne Finley MD    3/24/2021  Post anesthesia nausea and vomiting:  controlled      INITIAL Post-op Vital signs:   Vitals Value Taken Time   /64 03/24/21 1900   Temp 36.9 °C (98.5 °F) 03/24/21 1257   Pulse 94 03/24/21 1901   Resp 19 03/24/21 1901   SpO2 97 % 03/24/21 1901   Vitals shown include unvalidated device data.

## 2021-03-24 NOTE — PERIOP NOTES
Zo used for irrigation as needed during surgical procedure    03/24/21 @1150  Lot [id number]  Exp 10/31/2022

## 2021-03-24 NOTE — DISCHARGE INSTRUCTIONS
Patient Discharge Instructions    Lila Andres / [id number] : 1950    Admitted 3/24/2021 Discharged: 3/24/2021                             What to do at Home  Recommended activity: Elevate arm   No needle sticks or BP checks in affected arm  Leave dressing for 72-96 hours. Call for any HAND pain    Information obtained by :  I understand that if any problems occur once I am at home I am to contact my physician. I understand and acknowledge receipt of the instructions indicated above. R.N.'s Signature                                                                  Date/Time                                                                                                                                              Patient or Representative Signature                                                          Date/Time      Trinity Jauregui MD              DISCHARGE SUMMARY from Nurse    PATIENT INSTRUCTIONS:    After general anesthesia or intravenous sedation, for 24 hours or while taking prescription Narcotics:  · Limit your activities  · Do not drive and operate hazardous machinery  · Do not make important personal or business decisions  · Do  not drink alcoholic beverages  · If you have not urinated within 8 hours after discharge, please contact your surgeon on call. Report the following to your surgeon:  · Excessive pain, swelling, redness or odor of or around the surgical area  · Temperature over 100.5  · Nausea and vomiting lasting longer than 4 hours or if unable to take medications  · Any signs of decreased circulation or nerve impairment to extremity: change in color, persistent  numbness, tingling, coldness or increase pain  · Any questions    What to do at Home:  A common side effect of anesthesia following surgery is nausea and/or vomiting.  In order to decrease symptoms, it is wise to avoid foods that are high in fat, greasy foods, milk products, and spicy foods for the first 24 hours. Acceptable foods for the first 24 hours following surgery include but are not limited to:     soup   broth    toast    crackers    applesauce    bananas    mashed potatoes,   soft or scrambled eggs   oatmeal    jello    It is important to eat when taking your pain medication. This will help to prevent nausea. If possible, please try to time your meals with your medications. It is very important to stay hydrated following surgery. Sip fluids frequently while awake. Avoid acidic drinks such as citrus juices and soda for 24 hours. Carbonated beverages may cause bloating and gas. Acceptable fluids include:    - water (flavor packets may add variety)  - coffee or tea (in moderation)  - Gatorade  - Grzegorz-aid  - apple juice  - cranberry juice    You are encouraged to cough and deep breathe every hour when awake. This will help to prevent respiratory complications following anesthesia. You may want to hug a pillow when coughing and sneezing to add additional support to the surgical area and to decrease discomfort if you had abdominal or chest surgery. If you are discharged home with support stockings, you may remove them after 24 hours. Support stockings are used to help prevent blood clots in the legs following surgery. Please take time to review all of your Home Care Instructions and Medication Information sheets provided in your discharge packet. If you have any questions, please contact your surgeons office. Thank you. TO PREVENT AN INFECTION      1. [address] YOUR HANDS     To prevent infection, good handwashing is the most important thing you or your caregiver can do.  Wash your hands with soap and water or use the hand  we gave you before you touch any wounds. 2. SHOWER     Use the antibacterial soap we gave you when you take a shower.     • Shower with this soap until your wounds are healed.      • To reach all areas of your body, you may need someone to help you.     • Don’t forget to clean your belly button with every shower.    3.  USE CLEAN SHEETS    • Use freshly cleaned sheets on your bed after surgery.     • To keep the surgery site clean, do not allow pets to sleep with you while your wound is still healing.     4. STOP SMOKING    • Stop smoking, or at least cut back on smoking    • Smoking slows your healing.     5.  CONTROL YOUR BLOOD SUGAR    • High blood sugars slow wound healing.    If you are diabetic, control your blood sugar levels before and after your surgery. Patient Education      Narcotic-Analgesic/Acetaminophen (Percocet, Norco, Lorcet HD, Lortab 10/325) - (By mouth)   Why this medicine is used:   Relieves pain.  Contact a nurse or doctor right away if you have:  Extreme weakness, shallow breathing, slow heartbeat  Severe confusion, lightheadedness, dizziness, fainting  Yellow skin or eyes, dark urine or pale stools  Severe constipation, severe stomach pain, nausea, vomiting, loss of appetite  Sweating or cold, clammy skin     Common side effects:  Mild constipation, nausea, vomiting  Sleepiness, tiredness  Itching, rash  © 2017 [facility] Information is for End User's use only and may not be sold, redistributed or otherwise used for commercial purposes.           These are general instructions for a healthy lifestyle:    No smoking/ No tobacco products/ Avoid exposure to second hand smoke  Surgeon General's Warning:  Quitting smoking now greatly reduces serious risk to your health.    Obesity, smoking, and sedentary lifestyle greatly increases your risk for illness    A healthy diet, regular physical exercise & weight monitoring are important for maintaining a healthy lifestyle    You may be retaining fluid if you have a history of heart failure or if you experience any of the following symptoms:  Weight  gain of 3 pounds or more overnight or 5 pounds in a week, increased swelling in our hands or feet or shortness of breath while lying flat in bed. Please call your doctor as soon as you notice any of these symptoms; do not wait until your next office visit. The discharge information has been reviewed with the {PATIENT PARENT GUARDIAN:91382}. The {PATIENT PARENT GUARDIAN:95209} verbalized understanding. Discharge medications reviewed with the {Dishcarge meds reviewed [facility]:41567} and appropriate educational materials and side effects teaching were provided.   ___________________________________________________________________________________________________________________________________

## 2021-03-24 NOTE — ANESTHESIA PREPROCEDURE EVALUATION
Relevant Problems   CARDIOVASCULAR   (+) Second degree heart block      RENAL FAILURE   (+) ESRD (end stage renal disease) (HCC)   (+) ESRD on dialysis (HCC)   (+) Kidney disease      HEMATOLOGY   (+) Anemia   (+) Other specified anemias   (+) Severe anemia       Anesthetic History   No history of anesthetic complications            Review of Systems / Medical History  Patient summary reviewed, nursing notes reviewed and pertinent labs reviewed    Pulmonary  Within defined limits                 Neuro/Psych   Within defined limits           Cardiovascular    Hypertension        Dysrhythmias   PAD    Exercise tolerance: >4 METS     GI/Hepatic/Renal         Renal disease: dialysis       Endo/Other    Diabetes    Arthritis and cancer     Other Findings   Comments: ESRD    110 pack year hx            Physical Exam    Airway  Mallampati: II  TM Distance: 4 - 6 cm  Neck ROM: normal range of motion   Mouth opening: Normal     Cardiovascular  Regular rate and rhythm,  S1 and S2 normal,  no murmur, click, rub, or gallop             Dental  No notable dental hx       Pulmonary  Breath sounds clear to auscultation               Abdominal  GI exam deferred       Other Findings            Anesthetic Plan    ASA: 3  Anesthesia type: regional, MAC and total IV anesthesia - supraclavicular block    Monitoring Plan: BIS      Induction: Intravenous  Anesthetic plan and risks discussed with: Patient

## 2021-03-25 NOTE — OP NOTES
Replaced by [facility]  OPERATIVE REPORT    Name:  Babatunde Suarez  MR#:  [id number]  :  1950  ACCOUNT #:  [de-identified]  DATE OF SERVICE:  2021    PREOPERATIVE DIAGNOSIS:  End-stage renal disease. POSTOPERATIVE DIAGNOSIS:  End-stage renal disease. PROCEDURE PERFORMED:  Placement of a new PTFE straight graft in the right upper arm (7 mm Propaten from brachial artery to axillary vein). SURGEON:  Edgardo Rodriguez MD    ANESTHESIA:  Regional block. ESTIMATED BLOOD LOSS:  minimal.    INDICATIONS:  The patient is a 69-year-old dialysis patient with a longstanding non-salvageable loop graft in his right forearm. He is admitted for creation of access in his right upper arm. PROCEDURE:  After obtaining informed consent, the patient was placed supine on the operating table. After adequate induction of regional block anesthesia, site and patient confirmation, administration of prophylactic antibiotics, the right arm was prepped and draped in sterile fashion. A limited incision was made in the axilla and the axillary vein was dissected free and controlled. An oblique incision was made over the generous brachial artery pulse just above the elbow. A Deford-Tin tunneling device was used to superficially position a 7-mm heparin-bonded prosthesis laterally in the arm. The outflow anastomosis was created first in an end-to-side fashion using continuous Deford-Tin suture. The arterial anastomosis was then constructed in a similar end-to-side fashion. Just prior to completion of this anastomosis, the graft was allowed to backbleed and it did so readily. At the completion of the second anastomosis and release of clamps, there was excellent flow to the graft. Both suture lines were hemostatic. Both wounds were copiously irrigated with antibiotic solution and closed in two layers. The patient tolerated the procedure well. There were no complications.         [address] [place], MD FLEMING/[id number]  D:  03/25/2021 9:30  T:  03/25/2021 15:32  JOB #:  [id number]  CC:  Rakesh Keyes MD       [address] [place] [address]

## 2021-08-03 PROBLEM — D64.9 ANEMIA: Status: RESOLVED | Noted: 2019-08-22 | Resolved: 2021-08-03

## 2022-01-27 ENCOUNTER — HOSPITAL ENCOUNTER (EMERGENCY)
Age: 72
Discharge: HOME | End: 2022-01-27
Attending: EMERGENCY MEDICINE
Payer: MEDICARE

## 2022-01-27 ENCOUNTER — APPOINTMENT (OUTPATIENT)
Dept: CT IMAGING | Age: 72
End: 2022-01-27
Attending: EMERGENCY MEDICINE
Payer: MEDICARE

## 2022-01-27 ENCOUNTER — APPOINTMENT (OUTPATIENT)
Dept: GENERAL RADIOLOGY | Age: 72
End: 2022-01-27
Attending: EMERGENCY MEDICINE
Payer: MEDICARE

## 2022-01-27 VITALS
BODY MASS INDEX: 24.96 KG/M2 | TEMPERATURE: 98.7 F | WEIGHT: 164.68 LBS | OXYGEN SATURATION: 98 % | SYSTOLIC BLOOD PRESSURE: 104 MMHG | DIASTOLIC BLOOD PRESSURE: 61 MMHG | HEIGHT: 68 IN | HEART RATE: 95 BPM | RESPIRATION RATE: 15 BRPM

## 2022-01-27 DIAGNOSIS — R04.2 HEMOPTYSIS: Primary | ICD-10-CM

## 2022-01-27 LAB
ALBUMIN SERPL-MCNC: 3.8 G/DL (ref 3.5–5)
ALBUMIN/GLOB SERPL: 0.6 {RATIO} (ref 1.1–2.2)
ALP SERPL-CCNC: 168 U/L (ref 45–117)
ALT SERPL-CCNC: 42 U/L (ref 12–78)
ANION GAP SERPL CALC-SCNC: 11 MMOL/L (ref 5–15)
APTT PPP: 50.3 SEC (ref 22.1–31)
AST SERPL-CCNC: 41 U/L (ref 15–37)
BILIRUB SERPL-MCNC: 0.5 MG/DL (ref 0.2–1)
BUN SERPL-MCNC: 9 MG/DL (ref 6–20)
BUN/CREAT SERPL: 2 (ref 12–20)
CALCIUM SERPL-MCNC: 9.4 MG/DL (ref 8.5–10.1)
CHLORIDE SERPL-SCNC: 97 MMOL/L (ref 97–108)
CO2 SERPL-SCNC: 26 MMOL/L (ref 21–32)
COMMENT, HOLDF: NORMAL
CREAT SERPL-MCNC: 5.68 MG/DL (ref 0.7–1.3)
GLOBULIN SER CALC-MCNC: 6.1 G/DL (ref 2–4)
GLUCOSE SERPL-MCNC: 108 MG/DL (ref 65–100)
INR PPP: 1 (ref 0.9–1.1)
POTASSIUM SERPL-SCNC: 3 MMOL/L (ref 3.5–5.1)
PROT SERPL-MCNC: 9.9 G/DL (ref 6.4–8.2)
PROTHROMBIN TIME: 10.9 SEC (ref 9–11.1)
SAMPLES BEING HELD,HOLD: NORMAL
SODIUM SERPL-SCNC: 134 MMOL/L (ref 136–145)
THERAPEUTIC RANGE,PTTT: ABNORMAL SECS (ref 58–77)

## 2022-01-27 PROCEDURE — 71045 X-RAY EXAM CHEST 1 VIEW: CPT

## 2022-01-27 PROCEDURE — 74011000636 HC RX REV CODE- 636: Performed by: RADIOLOGY

## 2022-01-27 PROCEDURE — 80053 COMPREHEN METABOLIC PANEL: CPT

## 2022-01-27 PROCEDURE — 85610 PROTHROMBIN TIME: CPT

## 2022-01-27 PROCEDURE — 85025 COMPLETE CBC W/AUTO DIFF WBC: CPT

## 2022-01-27 PROCEDURE — 71275 CT ANGIOGRAPHY CHEST: CPT

## 2022-01-27 PROCEDURE — 99285 EMERGENCY DEPT VISIT HI MDM: CPT

## 2022-01-27 PROCEDURE — 36415 COLL VENOUS BLD VENIPUNCTURE: CPT

## 2022-01-27 PROCEDURE — 85730 THROMBOPLASTIN TIME PARTIAL: CPT

## 2022-01-27 RX ADMIN — IOPAMIDOL 100 ML: 755 INJECTION, SOLUTION INTRAVENOUS at 11:14

## 2022-01-27 NOTE — PROGRESS NOTES
T.O.C.    RUR: N/A  Disposition: Home pending medical stability  Follow up with PCP  Transport: [facility], [phone]    Reason for Admission:  coughing up blood and blood in urine/stool                      RUR Score:    N/A           Plan for utilizing home health:     none     PCP:    First and Last name:                   Name of Practice:                Are you a current patient: Yes/No:               Approximate date of last visit:               Can you participate in a virtual visit with your PCP:                     Current Advanced Directive/Advance Care Plan: Medical Decision Maker is his sister, Hernan Schwartz, [phone]     CM met with patient at bedside to introduce self and role. Living situation: his two daughters live with him  ADLs: semi independent  DME: prosthetic leg, walker  Demographics: confirmed  Main point of contact: [facility], [phone], Daughter, Peace Meyer, [phone]. Insurance: [facility]/ [facility]  Dialysis: [facility] on [facility] schedule. Usually uses medicaid transport to dialysis. CM to follow patient progress and assist as recommended with SAVANA plan. Care Management Interventions  PCP Verified by CM:  Yes  Palliative Care Criteria Met (RRAT>21 & CHF Dx)?: No  Mode of Transport at Discharge: BLS  MyChart Signup: Yes  Discharge Durable Medical Equipment: No  Health Maintenance Reviewed: Yes  Physical Therapy Consult: No  Occupational Therapy Consult: No  Speech Therapy Consult: No  Support Systems: Other Family Member(s) (Sister)  Confirm Follow Up Transport: Other (see comment) (S or [place])  The Plan for Transition of Care is Related to the Following Treatment Goals : Return to [facility] pending medical stability  Discharge Location  Patient Expects to be Discharged to[de-identified] [facility])    Kelechi Delgado, Master's of Social Work Supervisee    2:45 PM: CM spoke with patient's sister, Koko Kendrick, who confirmed that the patient was at dialysis today. She will come to  patient. Explained patient will need immediate follow up with his PCP. Sister will assist with arranging an appointment.  Sister will pick patient up for discharge around 3 pm.

## 2022-01-27 NOTE — ED TRIAGE NOTES
From dialysis, pt reports coughing up blood and reports yest there was blood in colostomy bag. On arrival stool in colostomy is brown and no blood noted. Pt denies pain or any other complaints. gcs 15, VSS.

## 2022-01-27 NOTE — DISCHARGE INSTRUCTIONS
You have some findings on your CT that are of concern and may well be causing the blood that you are sending in your sputum. We will give you a copy of your scans and lab results and have you follow-up with your primary physician at [facility] you can for further evaluation and treatment of these areas. In the meantime if your bleeding gets worse let your doctor know.

## 2022-01-27 NOTE — PROGRESS NOTES
CM spoke with Duncan Serrano at [facility], [phone], to ask if patient would be able to return to the facility. She confirmed that patient is not currently inpatient with them. CM to follow up with family regarding this information.     Angel Simms, Master's of Social Work Supervisee

## 2022-01-27 NOTE — ED NOTES
Pt arrived via EMS from dialysis c/o coughing up dark red blood x3 days. Pt reports he did notice some blod in his colostomy bag a couple days ago but none today. Pt denies any pain.

## 2022-01-27 NOTE — ED PROVIDER NOTES
This is a 57-year-old male with a history of prostate cancer chronic renal failure and dialysis, history of heme positive stools and hypertension and type 2 diabetes. Patient denies any history of lung issue and is never coughed up any blood previously. He states that over the last couple of days he is coughed a slight bit of just dark material he thinks was blood. Today he had the same complaint early when he was going towards dialysis. Following a complete dialysis he had a coughing spell and brought up he says a little bit more than a tablespoon or 2 of dark blood. He did not have any clots. He has had no shortness of breath, fever or chill and no chest pain. There has been no dizziness. He denies any abdominal pain and has not had any blood in his colostomy tube. Patient denies any weakness. He has no other acute symptomatology presently. He was sent here by dialysis for further evaluation of presumed hemoptysis.            Past Medical History:   Diagnosis Date    Arthritis     GOUT    Cancer ([facility] [address] 2015    PROSTATE    Chronic kidney disease     [facility]--s    Dialysis patient ([facility] [address] 10/11/2016    ESRD on dialysis ([facility] [address] 8/23/2016    Heme positive stool 4/1/2017    3/31/17    HTN (hypertension) 7/21/2011    Kidney disease 7/21/2011    Peripheral vascular disease ([facility] [address] 7/26/2017    S/P transmetatarsal amputation of foot, right ([facility] [address]     Toe amputation status 8/23/2016    Type II or unspecified type diabetes mellitus without mention of complication, not stated as uncontrolled     PATIENT DENIES       Past Surgical History:   Procedure Laterality Date    HX GI  09/2020    colostomy placement    HX ORTHOPAEDIC Right 09/2020    amp foot    HX PROSTATECTOMY  OCT 2015    BIOPSY ONLY    HX VASCULAR ACCESS Right     upper arm for dialysis    VASCULAR SURGERY PROCEDURE UNLIST  6/21/11    UPPER R CHEST DIALYSIS ACCESS    VASCULAR SURGERY PROCEDURE UNLIST  2011    SHUNT RIGHT FOREARM FOR DIALYSIS CHEST ACCESS REMOVED         Family History:   Problem Relation Age of Onset    Cancer Mother         LUNG AND COLON    Kidney Disease Mother     Hypertension Father     Hypertension Sister     Diabetes Brother     Kidney Disease Brother     Diabetes Sister     Diabetes Sister     Anesth Problems Neg Hx        Social History     Socioeconomic History    Marital status: SINGLE     Spouse name: Not on file    Number of children: Not on file    Years of education: Not on file    Highest education level: Not on file   Occupational History    Not on file   Tobacco Use    Smoking status: Former Smoker     Packs/day: 2.00     Years: 55.00     Pack years: 110.00     Quit date: 3/10/2020     Years since quittin.8    Smokeless tobacco: Never Used   Substance and Sexual Activity    Alcohol use: No     Comment: STOPPED DRINKING IN [id number] ALCOHOLIC-QUIT ON HIS OWN    Drug use: No    Sexual activity: Not on file   Other Topics Concern    Not on file   Social History Narrative    Not on file     Social Determinants of Health     Financial Resource Strain:     Difficulty of Paying Living Expenses: Not on file   Food Insecurity:     Worried About Running Out of Food in the Last Year: Not on file    Allison of Food in the Last Year: Not on file   Transportation Needs:     Lack of Transportation (Medical): Not on file    Lack of Transportation (Non-Medical):  Not on file   Physical Activity:     Days of Exercise per Week: Not on file    Minutes of Exercise per Session: Not on file   Stress:     Feeling of Stress : Not on file   Social Connections:     Frequency of Communication with Friends and Family: Not on file    Frequency of Social Gatherings with Friends and Family: Not on file    Attends Restorationism Services: Not on file    Active Member of Clubs or Organizations: Not on file    Attends Club or Organization Meetings: Not on file    Marital Status: Not on file   Intimate Partner Violence:     Fear of Current or Ex-Partner: Not on file    Emotionally Abused: Not on file    Physically Abused: Not on file    Sexually Abused: Not on file   Housing Stability:     Unable to Pay for Housing in the Last Year: Not on file    Number of Places Lived in the Last Year: Not on file    Unstable Housing in the Last Year: Not on file         ALLERGIES: Patient has no known allergies. Review of Systems   Constitutional: Negative for activity change, appetite change and fatigue. HENT: Negative for ear pain, facial swelling, sore throat and trouble swallowing. Eyes: Negative for pain, discharge and visual disturbance. Respiratory: Positive for cough ( See HPI with production of 1 to 2 tablespoons of dark material he described his blood. ). Negative for chest tightness, shortness of breath and wheezing. Cardiovascular: Negative for chest pain and palpitations. Gastrointestinal: Negative for abdominal pain, blood in stool, nausea and vomiting. Genitourinary: Negative for difficulty urinating, flank pain and hematuria. Musculoskeletal: Negative for arthralgias, joint swelling, myalgias and neck pain. Skin: Negative for color change and rash. Neurological: Negative for dizziness, weakness, numbness and headaches. Hematological: Negative for adenopathy. Does not bruise/bleed easily. Psychiatric/Behavioral: Negative for behavioral problems, confusion and sleep disturbance. All other systems reviewed and are negative. Vitals:    01/27/22 0958 01/27/22 1007   BP: 108/69    Pulse: 91    Resp: 18    Temp: 98.7 °F (37.1 °C)    SpO2: 97%    Weight:  74.7 kg (164 lb 10.9 oz)   Height:  5' 8\" (1.727 m)            Physical Exam  Vitals and nursing note reviewed. Constitutional:       General: He is not in acute distress. Appearance: He is well-developed. He is not ill-appearing or diaphoretic. Comments:  This is a 79-year-old male in no acute distress who has no shortness of breath and speaking in full sentences. He states that he is not having any chest pain but did cough up a slight degree of blood earlier. This was dark and not red. Vital signs are as noted   HENT:      Head: Normocephalic and atraumatic. Nose: Nose normal.   Eyes:      General: No scleral icterus. Conjunctiva/sclera: Conjunctivae normal.      Pupils: Pupils are equal, round, and reactive to light. Neck:      Thyroid: No thyromegaly. Vascular: No JVD. Trachea: No tracheal deviation. Comments: No carotid bruits noted. Cardiovascular:      Rate and Rhythm: Normal rate and regular rhythm. Heart sounds: Normal heart sounds. No murmur heard. No friction rub. No gallop. Pulmonary:      Effort: Pulmonary effort is normal. No respiratory distress. Breath sounds: Rhonchi ( Few rhonchi noted in the right upper anterior chest. No consolidation is noted.) present. No wheezing or rales. Chest:      Chest wall: No tenderness. Abdominal:      General: Bowel sounds are normal. There is no distension. Palpations: Abdomen is soft. There is no mass. Tenderness: There is no abdominal tenderness. There is no guarding or rebound. Comments: There is a colostomy bag in place on the right side of the abdomen. The stool is yellow and there is no evidence of any blood. Musculoskeletal:         General: No tenderness. Normal range of motion. Cervical back: Normal range of motion and neck supple. Comments: There is a right AKA amputation noted. Dialysis fistulas are noted in the right upper extremity. Lymphadenopathy:      Cervical: No cervical adenopathy. Skin:     General: Skin is warm and dry. Findings: No erythema or rash. Neurological:      Mental Status: He is alert and oriented to person, place, and time. Cranial Nerves: No cranial nerve deficit. Coordination: Coordination normal.      Deep Tendon Reflexes: Reflexes are normal and symmetric. Psychiatric:         Behavior: Behavior normal.         Thought Content: Thought content normal.         Judgment: Judgment normal.          MDM  Number of Diagnoses or Management Options     Amount and/or Complexity of Data Reviewed  Clinical lab tests: ordered and reviewed  Tests in the radiology section of CPT®: ordered and reviewed  Decide to obtain previous medical records or to obtain history from someone other than the patient: yes  Review and summarize past medical records: yes  Discuss the patient with other providers: yes  Independent visualization of images, tracings, or specimens: yes    Risk of Complications, Morbidity, and/or Mortality  Presenting problems: high  Diagnostic procedures: high  Management options: high    Patient Progress  Patient progress: stable         Procedures    This is a 78-year-old male who presents without respiratory distress with a history of coughing up some dark material appearing to be blood right after hemodialysis today. Is on no blood thinners and has had no chest difficulty in the past. He denies chest pain or shortness of breath. There is been no fever or chill. Will obtain labs and x-rays and reevaluate. Patient's chest x-ray t demonstrates bilateral lower lobe infiltrates or abnormalities. Will obtain CT scan at this time. The CT scan does not demonstrate any PE. There is a mass in the right middle lobe abutting the pleural surface and attached to the right fourth costosternal junction. It is suspicious for neoplastic process. There is also a cavitary lesion in the left lower lobe with adjacent airspace disease which is probably neoplastic. There is incidentally noted a 1.4 x 1.8 cm aneurysm projected to the left laterally off the aortic arch. There are also several bony lytic lesions noted that may also represent neoplasia. A consult is placed to [facility] for the patient's primary physician.   It would seem likely the patient could be discharged to follow back up with them for further work-up and evaluation. We have attempted to contact the primary physician group at [facility] to speak with him about the findings here. We have not been able to get in touch with anybody down there today. I told the patient to follow-up with them as soon as he is able to do so. He knows that he has several lesions in his lung that may be producing the bleeding in the nose needs to be evaluated as soon as possible for further management. He is instructed to follow-up with his primary physician if he begins to have more symptoms. He is given a copy of his CT and his labs to take with him to his primary physician and voices understanding of these instructions.

## 2022-01-28 LAB
BASOPHILS # BLD: 0.2 K/UL (ref 0–0.1)
BASOPHILS NFR BLD: 2 % (ref 0–1)
DIFFERENTIAL METHOD BLD: ABNORMAL
EOSINOPHIL # BLD: 2.8 K/UL (ref 0–0.4)
EOSINOPHIL NFR BLD: 26 % (ref 0–7)
ERYTHROCYTE [DISTWIDTH] IN BLOOD BY AUTOMATED COUNT: 17.7 % (ref 11.5–14.5)
HCT VFR BLD AUTO: 43.9 % (ref 36.6–50.3)
HGB BLD-MCNC: 14.3 G/DL (ref 12.1–17)
IMM GRANULOCYTES # BLD AUTO: 0 K/UL
IMM GRANULOCYTES NFR BLD AUTO: 0 %
LYMPHOCYTES # BLD: 1.6 K/UL (ref 0.8–3.5)
LYMPHOCYTES NFR BLD: 15 % (ref 12–49)
MCH RBC QN AUTO: 28.8 PG (ref 26–34)
MCHC RBC AUTO-ENTMCNC: 32.6 G/DL (ref 30–36.5)
MCV RBC AUTO: 88.3 FL (ref 80–99)
MONOCYTES # BLD: 0.8 K/UL (ref 0–1)
MONOCYTES NFR BLD: 7 % (ref 5–13)
NEUTS SEG # BLD: 5.4 K/UL (ref 1.8–8)
NEUTS SEG NFR BLD: 50 % (ref 32–75)
NRBC # BLD: 0 K/UL (ref 0–0.01)
NRBC BLD-RTO: 0 PER 100 WBC
PLATELET # BLD AUTO: 247 K/UL (ref 150–400)
PMV BLD AUTO: 11.1 FL (ref 8.9–12.9)
RBC # BLD AUTO: 4.97 M/UL (ref 4.1–5.7)
RBC MORPH BLD: ABNORMAL
WBC # BLD AUTO: 10.8 K/UL (ref 4.1–11.1)
WBC MORPH BLD: ABNORMAL

## 2022-03-18 PROBLEM — D64.89 OTHER SPECIFIED ANEMIAS: Status: ACTIVE | Noted: 2017-03-31

## 2022-03-18 PROBLEM — I44.1 SECOND DEGREE HEART BLOCK: Status: ACTIVE | Noted: 2019-08-26

## 2022-03-19 PROBLEM — D64.9 SEVERE ANEMIA: Status: ACTIVE | Noted: 2017-03-31

## 2022-03-19 PROBLEM — I73.9 PERIPHERAL VASCULAR DISEASE (HCC): Status: ACTIVE | Noted: 2017-07-26

## 2022-03-19 PROBLEM — K92.2 GI BLEED: Status: ACTIVE | Noted: 2019-08-14

## 2022-03-20 PROBLEM — R19.5 HEME POSITIVE STOOL: Status: ACTIVE | Noted: 2017-04-01

## 2023-05-24 RX ORDER — LATANOPROST 50 UG/ML
1 SOLUTION/ DROPS OPHTHALMIC
COMMUNITY

## 2023-05-24 RX ORDER — PANTOPRAZOLE SODIUM 40 MG/1
40 TABLET, DELAYED RELEASE ORAL 2 TIMES DAILY
COMMUNITY

## 2023-05-24 RX ORDER — ALBUTEROL SULFATE 90 UG/1
AEROSOL, METERED RESPIRATORY (INHALATION)
COMMUNITY
Start: 2017-12-04

## 2023-05-24 RX ORDER — ASPIRIN 81 MG/1
81 TABLET ORAL DAILY
COMMUNITY

## (undated) DEVICE — REM POLYHESIVE ADULT PATIENT RETURN ELECTRODE: Brand: VALLEYLAB

## (undated) DEVICE — SUTURE VCRL SZ 3-0 L27IN ABSRB UD L26MM SH 1/2 CIR J416H

## (undated) DEVICE — Z DISCONTINUED USE 2751540 TUBING IRRIG L10IN DISP PMP ENDOGATOR

## (undated) DEVICE — LABEL MED VASC MRMC STRL

## (undated) DEVICE — SET EXTN TBNG L BOR 4 W STPCOCK ST 32IN PRIMING VOL 6ML

## (undated) DEVICE — SUTURE ETHLN SZ 4-0 L18IN NONABSORBABLE BLK L19MM PS-2 3/8 1667H

## (undated) DEVICE — COVER,MAYO STAND,STERILE: Brand: MEDLINE

## (undated) DEVICE — CONNECTOR TBNG AUX H2O JET DISP FOR OLY 160/180 SER

## (undated) DEVICE — TOWEL SURG W17XL27IN STD BLU COT NONFENESTRATED PREWASHED

## (undated) DEVICE — SPONGE LAP 18X18IN STRL -- 5/PK

## (undated) DEVICE — SPONGE GZ W4XL4IN COT 12 PLY TYP VII WVN C FLD DSGN

## (undated) DEVICE — ENDO CARRY-ON PROCEDURE KIT INCLUDES ENZYMATIC SPONGE, GAUZE, BIOHAZARD LABEL, TRAY, LUBRICANT, DIRTY SCOPE LABEL, WATER LABEL, TRAY, DRAWSTRING PAD, AND DEFENDO 4-PIECE KIT.: Brand: ENDO CARRY-ON PROCEDURE KIT

## (undated) DEVICE — SUTURE NONABSORBABLE MONOFILAMENT CV-6 TTC-9 24 IN GORTX 6K02B

## (undated) DEVICE — INFECTION CONTROL KIT SYS

## (undated) DEVICE — 1200 GUARD II KIT W/5MM TUBE W/O VAC TUBE: Brand: GUARDIAN

## (undated) DEVICE — AIRLIFE™ U/CONNECT-IT OXYGEN TUBING 7 FEET (2.1 M) CRUSH-RESISTANT OXYGEN TUBING, VINYL TIPPED: Brand: AIRLIFE™

## (undated) DEVICE — CATH IV AUTOGRD BC BLU 22GA 25 -- INSYTE

## (undated) DEVICE — AGENT HEMSTAT W4XL4IN OXIDIZED REGENERATED CELOS ABSRB SFT

## (undated) DEVICE — SYRINGE MED 20ML STD CLR PLAS LUERLOCK TIP N CTRL DISP

## (undated) DEVICE — SOLIDIFIER FLUID 3000 CC ABSORB

## (undated) DEVICE — SOL INJ SOD CL 0.9% 500ML BG --

## (undated) DEVICE — NEEDLE HYPO 18GA L1.5IN PNK S STL HUB POLYPR SHLD REG BVL

## (undated) DEVICE — SUTURE PERMAHAND SZ 2-0 L30IN NONABSORBABLE BLK SILK W/O A305H

## (undated) DEVICE — BAG BELONG PT PERS CLEAR HANDL

## (undated) DEVICE — SOL IRR SOD CL 0.9% 1000ML BTL --

## (undated) DEVICE — SUTURE PERMAHAND SZ 3-0 L30IN NONABSORBABLE BLK SILK BRAID A304H

## (undated) DEVICE — GOWN,PREVENTION PLUS,XLN/2XL,ST,22/CS: Brand: MEDLINE

## (undated) DEVICE — Device

## (undated) DEVICE — PROBE VASC 8MHZ WTRPRF

## (undated) DEVICE — STAPLER SKIN H3.9MM WIRE DIA0.58MM CRWN 6.9MM 35 STPL ROT

## (undated) DEVICE — KENDALL RADIOLUCENT FOAM MONITORING ELECTRODE -RECTANGULAR SHAPE: Brand: KENDALL

## (undated) DEVICE — CANN NASAL O2 CAPNOGRAPHY AD -- FILTERLINE

## (undated) DEVICE — STERILE POLYISOPRENE POWDER-FREE SURGICAL GLOVES: Brand: PROTEXIS

## (undated) DEVICE — SET ADMIN 16ML TBNG L100IN 2 Y INJ SITE IV PIGGY BK DISP

## (undated) DEVICE — BANDAGE,GAUZE,BULKEE II,4.5"X4.1YD,STRL: Brand: MEDLINE

## (undated) DEVICE — QUILTED PREMIUM COMFORT UNDERPAD,EXTRA HEAVY: Brand: WINGS

## (undated) DEVICE — BW-412T DISP COMBO CLEANING BRUSH: Brand: SINGLE USE COMBINATION CLEANING BRUSH

## (undated) DEVICE — CAPSULE ENDOSCP L26.2MM DIA11.4MM BIOCOMPATIBLE PLAS SB 3

## (undated) DEVICE — SUT PROL 6-0 24IN BV1 DA BLU --

## (undated) DEVICE — KIT IV STRT W CHLORAPREP PD 1ML

## (undated) DEVICE — STRAP,POSITIONING,KNEE/BODY,FOAM,4X60": Brand: MEDLINE

## (undated) DEVICE — PREP SKN CHLRAPRP APL 26ML STR --

## (undated) DEVICE — 450 ML BOTTLE OF 0.05% CHLORHEXIDINE GLUCONATE IN 99.95% STERILE WATER FOR IRRIGATION, USP AND APPLICATOR.: Brand: IRRISEPT ANTIMICROBIAL WOUND LAVAGE